# Patient Record
Sex: MALE | Race: WHITE | NOT HISPANIC OR LATINO | Employment: OTHER | URBAN - METROPOLITAN AREA
[De-identification: names, ages, dates, MRNs, and addresses within clinical notes are randomized per-mention and may not be internally consistent; named-entity substitution may affect disease eponyms.]

---

## 2017-08-17 ENCOUNTER — OFFICE VISIT (OUTPATIENT)
Dept: BEHAVIORAL HEALTH | Facility: PHYSICIAN GROUP | Age: 32
End: 2017-08-17
Payer: MEDICARE

## 2017-08-17 DIAGNOSIS — F79 INTELLECTUAL DISABILITY: ICD-10-CM

## 2017-08-17 PROCEDURE — 90832 PSYTX W PT 30 MINUTES: CPT | Performed by: SOCIAL WORKER

## 2017-08-17 NOTE — BH THERAPY
" [facility]  Therapy Progress Note    Patient Name: Leodan Hyatt  Patient MRN: [id number]  Today's Date: 2017     Type of session:Individual psychotherapy  Length of session: 30 minutes  Persons in attendance:Patient and , Kimberly     Subjective/New Info: Per  Kimberly pt (Denis) said at a recent meeting that he wanted to go to counseling.  His guardians, mother and grandmother, then requested these services be provided.  When asked why he is here Denis says \"I'm sad.\"  Says he misses his father, who  approx .  Around that time his grandfather and best friend also  unexpectedly.  Kimberly states that grandfather was a positive influence, and Denis's primary father figure.      Denis received services through [facility] for many years.  When he was 18, his   applied to the \"[facility]\" program, and they placed Denis in a group home where he currently lives with three roommates.  Works at [employer]  approx 6 hrs/day, M-DANILO Harris says she believes Denis has had difficulty coping w/the multiple losses, especially his grandfather.  She is uncertain whether talking with a therapist will be beneficial, as there is some question whether Denis's intellectual capabilities are sufficient to allow him to \"process\" grief in such a way that will be helpful to ease his sadness.      Objective/Observations:   Participation: Limited verbal participation   Grooming: Casual   Cognition: Alert and Fully Oriented   Eye contact: Limited   Mood: Anxious   Affect: Constricted   Thought process: Logical   Speech: Latency of response and limited vocabulary.  Difficulty remembering details, looked to  for help.   Other:     Diagnoses:   1. Intellectual disability         Current risk:   SUICIDE: Low   Homicide: Low   Self-harm: Low   Relapse: Not applicable   Other:    Safety Plan reviewed? No   If evidence of imminent risk is present, intervention/plan:     Therapeutic " Intervention(s): Develop/modify treatment plan and Establish rapport    Treatment Goal(s)/Objective(s) addressed: First session     Progress toward Treatment Goals: First session    Plan:  In discussion w/, it was decided we will schedule two f/u sessions for individual therapy, to determine if this mileu will be beneficial tp pt.    Pooja Shaffer L.C.S.W.  8/17/2017

## 2017-10-05 ENCOUNTER — OFFICE VISIT (OUTPATIENT)
Dept: BEHAVIORAL HEALTH | Facility: PHYSICIAN GROUP | Age: 32
End: 2017-10-05
Payer: MEDICARE

## 2017-10-05 DIAGNOSIS — F79 INTELLECTUAL DISABILITY: ICD-10-CM

## 2017-10-05 PROCEDURE — 90832 PSYTX W PT 30 MINUTES: CPT | Performed by: SOCIAL WORKER

## 2017-10-05 NOTE — BH THERAPY
" [facility]  Therapy Progress Note    Patient Name: Leodan Hyatt  Patient MRN: [id number]  Today's Date: 10/5/2017     Type of session:Individual psychotherapy  Length of session: 30 minutes  Persons in attendance:Patient    Subjective/New Info: \"I miss my dad.\"  Fa  . When asked pt states he remembers father taking him out to eat and giving him hugs.  Did not get to spend a lot of time w/him.  Remembers \"dumpster diving\" w/father when he was young.  Has infrequent contact w/grandmother, uncle, and younger brother, all of whom he reports having good relationships with.   Puts hand on heart when asked where he believes father is.  Believes father was proud of him, remembers being told x 1.    Life is stable at group home, though pt refuses at times to do his chores.      Objective/Observations:   Participation: Limited verbal participation   Grooming: Casual   Cognition: intellectually disabled; brief, concrete responses, no ability to abstract   Eye contact: Good   Mood: Euthymic   Affect: Blunted   Thought process: concrete   Speech: Hypotalkative and Latency of response   Other:     Diagnoses:   1. Intellectual disability         Current risk:   SUICIDE: Low   Homicide: Low   Self-harm: Low   Relapse: Not applicable   Other:    Safety Plan reviewed? Not Indicated   If evidence of imminent risk is present, intervention/plan:     Therapeutic Intervention(s): Stressors assessed and Supportive psychotherapy    Treatment Goal(s)/Objective(s) addressed: Exploring pt's ideas about loss of father     Progress toward Treatment Goals: No change    Plan:  - Continue Individual therapy in one month; discuss termination at that time b/c of pt's limitations to engage in conversation    Pooja Shaffer L.C.S.W.  10/5/2017                                 "

## 2017-11-01 ENCOUNTER — OFFICE VISIT (OUTPATIENT)
Dept: BEHAVIORAL HEALTH | Facility: PHYSICIAN GROUP | Age: 32
End: 2017-11-01
Payer: MEDICARE

## 2017-11-01 DIAGNOSIS — F79 INTELLECTUAL DISABILITY: ICD-10-CM

## 2017-11-01 DIAGNOSIS — F33.1 MAJOR DEPRESSIVE DISORDER, RECURRENT EPISODE, MODERATE (HCC): ICD-10-CM

## 2017-11-01 PROCEDURE — 90832 PSYTX W PT 30 MINUTES: CPT | Performed by: SOCIAL WORKER

## 2017-11-01 NOTE — BH THERAPY
" [facility]  Therapy Progress Note    Patient Name: Leodan Hyatt  Patient MRN: [id number]  Today's Date: 11/1/2017     Type of session:Individual psychotherapy  Length of session: 30 minutes  Persons in attendance:Patient    Subjective/New Info: \"I miss my dad bad.\"  Issue essentially unchanged w/pt offering no elaboration.  Believes father is \"here\" (points to his heart) or \"there\" (points up to 'heaven'\").  Unable to articulate further on subject.  Most questions answered w/\"I don't know.\"  When asked if he wants to return, he states he doesn't know.     Objective/Observations:   Participation: Limited verbal participation   Grooming: Casual   Cognition: Alert and Fully Oriented   Eye contact: Limited   Mood: Depressed   Affect: Blunted   Thought process: Logical   Speech: Hypotalkative and Latency of response   Other:     Diagnoses:   1. Intellectual disability    2. Major depressive disorder, recurrent episode, moderate (CMS-HCC)         Current risk:   SUICIDE: Low   Homicide: Low   Self-harm: Low   Relapse: Not applicable   Other:    Safety Plan reviewed? Not Indicated   If evidence of imminent risk is present, intervention/plan:     Therapeutic Intervention(s): Stressors assessed and Supportive psychotherapy    Treatment Goal(s)/Objective(s) addressed: Explore grief related to death of father     Progress toward Treatment Goals: No change    Plan:  - Spoke w/Kimberly, who agreed therapy not progressing, and will terminate.  Pt understands this concept.    Pooja Shaffer L.C.S.W.  11/1/2017                                 "

## 2018-03-27 ENCOUNTER — HOSPITAL ENCOUNTER (OUTPATIENT)
Dept: LAB | Facility: MEDICAL CENTER | Age: 33
End: 2018-03-27
Attending: FAMILY MEDICINE
Payer: MEDICARE

## 2018-03-27 LAB
ANION GAP SERPL CALC-SCNC: 6 MMOL/L (ref 0–11.9)
BASOPHILS # BLD AUTO: 0.9 % (ref 0–1.8)
BASOPHILS # BLD: 0.08 K/UL (ref 0–0.12)
BUN SERPL-MCNC: 10 MG/DL (ref 8–22)
CALCIUM SERPL-MCNC: 8.9 MG/DL (ref 8.5–10.5)
CHLORIDE SERPL-SCNC: 105 MMOL/L (ref 96–112)
CHOLEST SERPL-MCNC: 120 MG/DL (ref 100–199)
CO2 SERPL-SCNC: 26 MMOL/L (ref 20–33)
CREAT SERPL-MCNC: 0.75 MG/DL (ref 0.5–1.4)
EOSINOPHIL # BLD AUTO: 0.15 K/UL (ref 0–0.51)
EOSINOPHIL NFR BLD: 1.6 % (ref 0–6.9)
ERYTHROCYTE [DISTWIDTH] IN BLOOD BY AUTOMATED COUNT: 41.1 FL (ref 35.9–50)
EST. AVERAGE GLUCOSE BLD GHB EST-MCNC: 108 MG/DL
GLUCOSE SERPL-MCNC: 77 MG/DL (ref 65–99)
HBA1C MFR BLD: 5.4 % (ref 0–5.6)
HCT VFR BLD AUTO: 46.9 % (ref 42–52)
HDLC SERPL-MCNC: 34 MG/DL
HGB BLD-MCNC: 14.4 G/DL (ref 14–18)
IMM GRANULOCYTES # BLD AUTO: 0.04 K/UL (ref 0–0.11)
IMM GRANULOCYTES NFR BLD AUTO: 0.4 % (ref 0–0.9)
LDLC SERPL CALC-MCNC: 73 MG/DL
LYMPHOCYTES # BLD AUTO: 1.3 K/UL (ref 1–4.8)
LYMPHOCYTES NFR BLD: 13.9 % (ref 22–41)
MCH RBC QN AUTO: 26.2 PG (ref 27–33)
MCHC RBC AUTO-ENTMCNC: 30.7 G/DL (ref 33.7–35.3)
MCV RBC AUTO: 85.3 FL (ref 81.4–97.8)
MONOCYTES # BLD AUTO: 0.72 K/UL (ref 0–0.85)
MONOCYTES NFR BLD AUTO: 7.7 % (ref 0–13.4)
NEUTROPHILS # BLD AUTO: 7.06 K/UL (ref 1.82–7.42)
NEUTROPHILS NFR BLD: 75.5 % (ref 44–72)
NRBC # BLD AUTO: 0 K/UL
NRBC BLD-RTO: 0 /100 WBC
PLATELET # BLD AUTO: 247 K/UL (ref 164–446)
PMV BLD AUTO: 11.7 FL (ref 9–12.9)
POTASSIUM SERPL-SCNC: 3.9 MMOL/L (ref 3.6–5.5)
RBC # BLD AUTO: 5.5 M/UL (ref 4.7–6.1)
SODIUM SERPL-SCNC: 137 MMOL/L (ref 135–145)
TRIGL SERPL-MCNC: 66 MG/DL (ref 0–149)
TSH SERPL DL<=0.005 MIU/L-ACNC: 2.45 UIU/ML (ref 0.38–5.33)
WBC # BLD AUTO: 9.4 K/UL (ref 4.8–10.8)

## 2018-03-27 PROCEDURE — 83036 HEMOGLOBIN GLYCOSYLATED A1C: CPT | Mod: GA

## 2018-03-27 PROCEDURE — 84443 ASSAY THYROID STIM HORMONE: CPT

## 2018-03-27 PROCEDURE — 85025 COMPLETE CBC W/AUTO DIFF WBC: CPT

## 2018-03-27 PROCEDURE — 36415 COLL VENOUS BLD VENIPUNCTURE: CPT

## 2018-03-27 PROCEDURE — 80048 BASIC METABOLIC PNL TOTAL CA: CPT

## 2018-03-27 PROCEDURE — 80061 LIPID PANEL: CPT

## 2021-08-23 ENCOUNTER — HOSPITAL ENCOUNTER (EMERGENCY)
Facility: MEDICAL CENTER | Age: 36
End: 2021-08-23
Payer: MEDICARE

## 2021-08-23 VITALS
DIASTOLIC BLOOD PRESSURE: 86 MMHG | SYSTOLIC BLOOD PRESSURE: 157 MMHG | TEMPERATURE: 96.6 F | WEIGHT: 210.1 LBS | HEIGHT: 68 IN | RESPIRATION RATE: 18 BRPM | OXYGEN SATURATION: 97 % | BODY MASS INDEX: 31.84 KG/M2 | HEART RATE: 130 BPM

## 2021-08-23 LAB — EKG IMPRESSION: NORMAL

## 2021-08-23 PROCEDURE — 302449 STATCHG TRIAGE ONLY (STATISTIC)

## 2021-08-23 PROCEDURE — 93005 ELECTROCARDIOGRAM TRACING: CPT

## 2021-08-24 NOTE — ED TRIAGE NOTES
Chief Complaint   Patient presents with   • Tachycardia     today; pt has recently changed medications at group home     Pt ambulatory to triage with caregiver from group home ([facility]) for above complaint. Pt started new medications recently (Austedo 9mg on 7/28 and Mirtazapine on 8/17) and staff at group home is unsure if this could be affecting his heart. Pt only started complaining of fast heart rate today.    Pt has tardive dyskinesia and is constantly moving but is cooperative. Pt is alert/oriented and follows commands. Pt speaking in full sentences and responds appropriately to questions. No acute distress noted in triage and respirations are even and unlabored.     Pt placed in lobby and educated on triage process. Pt and caregiver encouraged to alert staff for any changes in condition.

## 2021-08-25 ENCOUNTER — TELEPHONE (OUTPATIENT)
Dept: HEALTH INFORMATION MANAGEMENT | Facility: OTHER | Age: 36
End: 2021-08-25

## 2021-11-09 ENCOUNTER — HOSPITAL ENCOUNTER (OUTPATIENT)
Dept: RADIOLOGY | Facility: MEDICAL CENTER | Age: 36
End: 2021-11-09
Attending: NURSE PRACTITIONER
Payer: MEDICARE

## 2021-11-09 DIAGNOSIS — G25.5 CHOREA: ICD-10-CM

## 2021-11-09 DIAGNOSIS — G25.2 OTHER SPECIFIED FORMS OF TREMOR: ICD-10-CM

## 2021-11-09 DIAGNOSIS — G24.8 OTHER DYSTONIA: ICD-10-CM

## 2021-11-09 DIAGNOSIS — R25.1 TREMOR: ICD-10-CM

## 2021-11-09 PROCEDURE — 700102 HCHG RX REV CODE 250 W/ 637 OVERRIDE(OP): Performed by: RADIOLOGY

## 2021-11-09 PROCEDURE — A9270 NON-COVERED ITEM OR SERVICE: HCPCS | Performed by: RADIOLOGY

## 2021-11-09 RX ORDER — DIAZEPAM 5 MG/1
10 TABLET ORAL
Status: COMPLETED | OUTPATIENT
Start: 2021-11-09 | End: 2021-11-09

## 2021-11-09 RX ADMIN — DIAZEPAM 10 MG: 5 TABLET ORAL at 11:47

## 2021-11-09 NOTE — DISCHARGE INSTRUCTIONS
MRI ADULT DISCHARGE INSTRUCTIONS    You have been medicated today for your scan. Please follow the instructions below to ensure your safe recovery. If you have any questions or problems, feel free to call us at [phone] or [phone].     1.   Have someone stay with you to assist you as needed.    2.   Do not drive or operate any mechanical devices.    3.   Do not perform any activity that requires concentration. Make no major decisions over the next 24 hours.     4.   Be careful changing positions from laying down to sitting or standing, as you may become dizzy.     5.   Do not drink alcohol for 48 hours.    6.   There are no restrictions for eating your normal meals. Drink fluids.    7.   You may continue your usual medications for pain, tranquilizers, muscle relaxants or sedatives when awake.     8.   Tomorrow, you may continue your normal daily activities.     9.   Pressure dressing on 10 - 15 minutes. If swelling or bleeding occurs when removed, continue placing direct pressure on injection site for another 5 minutes, or until bleeding stops.   Diazepam (VALIUM) Oral solution  What is this medicine?  You were prescribed DIAZEPAM (dye AZ e carolyn) for the procedure you had today. This medication is a benzodiazepine. It is used to treat anxiety and nervousness. It also can help treat alcohol withdrawal, relax muscles, and treat certain types of seizures.  This medicine may be used for other purposes; ask your health care provider or pharmacist if you have questions.  What side effects may I notice from receiving this medicine?  Side effects that you should report to your doctor or health care professional as soon as possible:  • allergic reactions like skin rash, itching or hives, swelling of the face, lips, or tongue  • angry, confused, depressed, other mood changes  • breathing problems  • feeling faint or lightheaded, falls  • muscle cramps  • problems with balance, talking,  walking  • restlessness  • tremors  • trouble passing urine or change in the amount of urine  • unusually weak or tired  Side effects that usually do not require medical attention (report to your doctor or health care professional if they continue or are bothersome):  • difficulty sleeping, nightmares  • dizziness, drowsiness, clumsiness, or unsteadiness, a hangover effect  • headache  • nausea, vomiting  This list may not describe all possible side effects. Call your doctor for medical advice about side effects. You may report side effects to FDA at 5-509-FNT-6695.    I have been informed of and understand the above discharge instructions.

## 2021-11-09 NOTE — PROGRESS NOTES
Tremors did not subside with Valium 10 mg po. Discharged ambulatory with caregiver who was instructed on how to schedule with anesthesia

## 2021-12-21 ENCOUNTER — TELEPHONE (OUTPATIENT)
Dept: RADIOLOGY | Facility: MEDICAL CENTER | Age: 36
End: 2021-12-21
Payer: MEDICARE

## 2021-12-31 ENCOUNTER — PRE-ADMISSION TESTING (OUTPATIENT)
Dept: ADMISSIONS | Facility: MEDICAL CENTER | Age: 36
End: 2021-12-31
Attending: NURSE PRACTITIONER
Payer: MEDICARE

## 2021-12-31 DIAGNOSIS — Z01.811 PRE-OPERATIVE RESPIRATORY EXAMINATION: ICD-10-CM

## 2021-12-31 LAB — COVID ORDER STATUS COVID19: NORMAL

## 2021-12-31 PROCEDURE — U0005 INFEC AGEN DETEC AMPLI PROBE: HCPCS

## 2021-12-31 PROCEDURE — U0003 INFECTIOUS AGENT DETECTION BY NUCLEIC ACID (DNA OR RNA); SEVERE ACUTE RESPIRATORY SYNDROME CORONAVIRUS 2 (SARS-COV-2) (CORONAVIRUS DISEASE [COVID-19]), AMPLIFIED PROBE TECHNIQUE, MAKING USE OF HIGH THROUGHPUT TECHNOLOGIES AS DESCRIBED BY CMS-2020-01-R: HCPCS

## 2022-01-01 LAB
SARS-COV-2 RNA RESP QL NAA+PROBE: NOTDETECTED
SPECIMEN SOURCE: NORMAL

## 2022-01-04 ENCOUNTER — ANESTHESIA EVENT (OUTPATIENT)
Dept: RADIOLOGY | Facility: MEDICAL CENTER | Age: 37
End: 2022-01-04
Payer: MEDICARE

## 2022-01-05 ENCOUNTER — ANESTHESIA (OUTPATIENT)
Dept: RADIOLOGY | Facility: MEDICAL CENTER | Age: 37
End: 2022-01-05
Payer: MEDICARE

## 2022-01-05 ENCOUNTER — HOSPITAL ENCOUNTER (OUTPATIENT)
Dept: RADIOLOGY | Facility: MEDICAL CENTER | Age: 37
End: 2022-01-05
Attending: NURSE PRACTITIONER
Payer: MEDICARE

## 2022-01-05 VITALS
RESPIRATION RATE: 20 BRPM | TEMPERATURE: 96.6 F | OXYGEN SATURATION: 94 % | HEART RATE: 117 BPM | WEIGHT: 147.93 LBS | BODY MASS INDEX: 22.42 KG/M2 | HEIGHT: 68 IN

## 2022-01-05 DIAGNOSIS — G24.8 OTHER DYSTONIA: ICD-10-CM

## 2022-01-05 DIAGNOSIS — G25.2 OTHER SPECIFIED FORMS OF TREMOR: ICD-10-CM

## 2022-01-05 DIAGNOSIS — G25.5 CHOREA: ICD-10-CM

## 2022-01-05 DIAGNOSIS — R25.1 TREMOR: ICD-10-CM

## 2022-01-05 PROCEDURE — 700111 HCHG RX REV CODE 636 W/ 250 OVERRIDE (IP)

## 2022-01-05 PROCEDURE — 700101 HCHG RX REV CODE 250

## 2022-01-05 PROCEDURE — 70551 MRI BRAIN STEM W/O DYE: CPT

## 2022-01-05 RX ORDER — MIDAZOLAM HYDROCHLORIDE 1 MG/ML
INJECTION INTRAMUSCULAR; INTRAVENOUS
Status: DISPENSED
Start: 2022-01-05 | End: 2022-01-05

## 2022-01-05 RX ORDER — ONDANSETRON 2 MG/ML
4 INJECTION INTRAMUSCULAR; INTRAVENOUS
Status: DISCONTINUED | OUTPATIENT
Start: 2022-01-05 | End: 2022-01-06 | Stop reason: HOSPADM

## 2022-01-05 RX ORDER — VALBENAZINE 80 MG/1
80 CAPSULE ORAL DAILY
COMMUNITY
End: 2023-11-16

## 2022-01-05 RX ORDER — DIPHENHYDRAMINE HYDROCHLORIDE 50 MG/ML
12.5 INJECTION INTRAMUSCULAR; INTRAVENOUS
Status: DISCONTINUED | OUTPATIENT
Start: 2022-01-05 | End: 2022-01-06 | Stop reason: HOSPADM

## 2022-01-05 RX ORDER — SODIUM CHLORIDE, SODIUM LACTATE, POTASSIUM CHLORIDE, CALCIUM CHLORIDE 600; 310; 30; 20 MG/100ML; MG/100ML; MG/100ML; MG/100ML
INJECTION, SOLUTION INTRAVENOUS CONTINUOUS
Status: DISCONTINUED | OUTPATIENT
Start: 2022-01-05 | End: 2022-01-06 | Stop reason: HOSPADM

## 2022-01-05 RX ORDER — CLONAZEPAM 2 MG/1
2 TABLET ORAL
COMMUNITY
End: 2023-11-16

## 2022-01-05 RX ORDER — HALOPERIDOL 5 MG/ML
1 INJECTION INTRAMUSCULAR
Status: DISCONTINUED | OUTPATIENT
Start: 2022-01-05 | End: 2022-01-06 | Stop reason: HOSPADM

## 2022-01-05 RX ORDER — SODIUM CHLORIDE, SODIUM LACTATE, POTASSIUM CHLORIDE, AND CALCIUM CHLORIDE .6; .31; .03; .02 G/100ML; G/100ML; G/100ML; G/100ML
500 INJECTION, SOLUTION INTRAVENOUS ONCE
Status: DISCONTINUED | OUTPATIENT
Start: 2022-01-05 | End: 2022-01-06 | Stop reason: HOSPADM

## 2022-01-05 RX ORDER — QUETIAPINE FUMARATE 25 MG/1
25 TABLET, FILM COATED ORAL
COMMUNITY
End: 2022-05-26

## 2022-01-05 ASSESSMENT — PAIN SCALES - GENERAL: PAIN_LEVEL: 0

## 2022-01-05 NOTE — ANESTHESIA POSTPROCEDURE EVALUATION
Patient: Leodan Hyatt    Procedure Summary     Date: 01/05/22 Room / Location: [facility] - MRI - [address]    Anesthesia Start:  Anesthesia Stop:     Procedure: MR-BRAIN-W/O Diagnosis:       Other specified forms of tremor      Tremor      Other dystonia      Chorea          Scheduled Providers: Jett Churchill M.D. Responsible Provider:     Anesthesia Type: general ASA Status: Not recorded          Final Anesthesia Type: No value filed.  Last vitals  BP   NIBP: (!) 76/52    Temp   35.9 °C (96.6 °F)    Pulse   (!) 120   Resp   (!) 24    SpO2   96 %      Anesthesia Post Evaluation    Patient location during evaluation: PACU  Patient participation: complete - patient participated  Level of consciousness: awake and alert  Pain score: 0    Airway patency: patent  Anesthetic complications: no  Cardiovascular status: hemodynamically stable  Respiratory status: acceptable  Hydration status: euvolemic    PONV: none      Difficult to get BP on pt due to involuntary movements. Last BP was 150/105. Sitting.  Pt does have dx of orthostatic hypotension.     No complications documented.     Nurse Pain Score: 0 (NPRS)

## 2022-01-05 NOTE — PROGRESS NOTES
"MRI NURSING NOTES:      Patient to MRI Outpatient Dept.  Patient informed process and plan of care during this visit.  Anesthesiologist, Dr Cordova spoke c Patient and discussed provider's plan of care.  Patient agreed.  Pt found to be tachypneic and tachycardic upon assessment for pre-op vitals - per records this is Pt's baseline.  MRI Brain s contrast, sz protocol completed.  Patient awoke from anesthesia s discomfort.   bolus given per MD order for Bp 76/52.  Patient tolerated diet and activities once awake and appropriate.  DC instructions discussed. Questions encouraged.  Questions answered &/or deferred to provider.  Multiple attempts to obtain BP due to TD.  Pt states always difficult to take his BP. After several attempts, /105 p LR bolus.  Pt felt \"fine\" throughout recovery to DC.    Patient DC'd in stable condition c responsible adult, Valente .        "

## 2022-01-05 NOTE — DISCHARGE INSTRUCTIONS
MRI ADULT DISCHARGE INSTRUCTIONS    You have been medicated today for your scan. Please follow the instructions below to ensure your safe recovery. If you have any questions or problems, feel free to call us at [phone] or [phone].     1.   Have someone stay with you to assist you as needed.    2.   Do not drive or operate any mechanical devices.    3.   Do not perform any activity that requires concentration. Make no major decisions over the next 24 hours.     4.   Be careful changing positions from laying down to sitting or standing, as you may become dizzy.     5.   Do not drink alcohol for 48 hours.    6.   There are no restrictions for eating your normal meals. Drink fluids.    7.   You may continue your usual medications for pain, tranquilizers, muscle relaxants or sedatives when awake.     8.   Tomorrow, you may continue your normal daily activities.     9.   Pressure dressing on 10 - 15 minutes. If swelling or bleeding occurs when removed, continue placing direct pressure on injection site for another 5 minutes, or until bleeding stops.     Midazolam (VERSED)  What is this medicine?  You were given MIDAZOLAM (MARK golden) for your procedure today. This medication is a benzodiazepine. It is used to cause relaxation or sleep before surgery and to block the memory of the procedure.  This medicine may be used for other purposes; ask your health care provider or pharmacist if you have questions.  What side effects may I notice from receiving this medicine?  Side effects that you should report to your doctor or health care professional as soon as possible:  • allergic reactions like skin rash, itching or hives, swelling of the face, lips, or tongue  • breathing problems  • confusion  • dizziness or lightheadedness  • fast, irregular heartbeat  • halluninations during recovery  • numbness or tingling in the hands or feet  • pain, redness, or swelling at site where injected  • seizures  Side effects that usually  do not require medical attention (report to your doctor or health care professional if they continue or are bothersome):  • coughing  • headache  • hiccups  • involuntary eye and muscle movements  • loss of memory of events just before, during, and after use  • nausea, vomiting  • speech problems  • tiredness  • trouble sleeping or nightmares  This list may not describe all possible side effects. Call your doctor for medical advice about side effects. You may report side effects to FDA at 4-898-ORD-9620.    Fentanyl  What is this medicine?  You were given FENTANYL (FEN ta nil) for your procedure today, it is a pain reliever. It is used to treat breakthrough pain that your long acting pain medicine does not control. Do not use this medicine for a pain that will go away in a few days like pain from surgery, doctor or dentist visits.   This medicine may be used for other purposes; ask your health care provider or pharmacist if you have questions.  What side effects may I notice from receiving this medicine?  Side effects that you should report to your doctor or health care professional as soon as possible:  • allergic reactions like skin rash, itching or hives, swelling of the face, lips, or tongue  • breathing problems  • changes in vision  • confusion  • dry mouth  • feeling faint, lightheaded  • hallucination  • irregular heartbeat  • mouth pain, sores  • problems with balance, talking, walking  • trouble passing urine or change in the amount of urine  • unusual bleeding or bruising  • unusually weak or tired  Side effects that usually do not require medical attention (report to your doctor or health care professional if they continue or are bothersome):  • dizzy  • headache  • loss of appetite  • nausea, vomiting  • sweating  • tingling in mouth  This list may not describe all possible side effects. Call your doctor for medical advice about side effects. You may report side effects to FDA at 6-360-FDA-8964.      I  have been informed of and understand the above discharge instructions.

## 2022-01-05 NOTE — ANESTHESIA TIME REPORT
Anesthesia Start and Stop Event Times    No anesthesia events of the specified type filed       Responsible Staff    No responsible staff documented.       Preop Diagnosis (Free Text):  Pre-op Diagnosis             Preop Diagnosis (Codes):    Premium Reason  Non-Premium    Comments:

## 2022-01-05 NOTE — ANESTHESIA PREPROCEDURE EVALUATION
Date/Time: 01/05/22 1215    Scheduled providers: Jett Churchill M.D.    Procedure: MR-BRAIN-W/O    Diagnosis:       Other specified forms of tremor [G25.2]      Tremor [R25.1]      Other dystonia [G24.8]      Chorea [G25.5]          Location: [facility] - MRI - [address]          Relevant Problems   No relevant active problems       Physical Exam    Airway   Mallampati: II  TM distance: >3 FB  Neck ROM: full       Cardiovascular - normal exam  Rhythm: regular  Rate: normal  (-) murmur     Dental - normal exam           Pulmonary - normal exam  Breath sounds clear to auscultation     Abdominal    Neurological - normal exam         Other findings: Edentulous, sever tardive dyskinesia        Pt is non-verbal.  states he has good stamina, no syncope.  I do not hear any murmur (concern is AS). Echo was rescheduled d/t involuntary movement.    Anesthesia Plan    ASA 2       Plan - general       Airway plan will be LMA          Induction: intravenous    Postoperative Plan: Postoperative administration of opioids is intended.    Pertinent diagnostic labs and testing reviewed    Informed Consent:    Anesthetic plan and risks discussed with patient.    Use of blood products discussed with: patient whom consented to blood products.

## 2022-01-18 ENCOUNTER — TELEPHONE (OUTPATIENT)
Dept: HEALTH INFORMATION MANAGEMENT | Facility: OTHER | Age: 37
End: 2022-01-18

## 2022-04-06 ENCOUNTER — OFFICE VISIT (OUTPATIENT)
Dept: MEDICAL GROUP | Facility: PHYSICIAN GROUP | Age: 37
End: 2022-04-06
Payer: MEDICARE

## 2022-04-06 VITALS
RESPIRATION RATE: 18 BRPM | DIASTOLIC BLOOD PRESSURE: 62 MMHG | TEMPERATURE: 97.5 F | OXYGEN SATURATION: 96 % | SYSTOLIC BLOOD PRESSURE: 100 MMHG | HEIGHT: 68 IN | BODY MASS INDEX: 22.49 KG/M2 | HEART RATE: 101 BPM

## 2022-04-06 DIAGNOSIS — K61.1 PERIRECTAL ABSCESS: ICD-10-CM

## 2022-04-06 DIAGNOSIS — F17.209 NICOTINE DEPENDENCE WITH NICOTINE-INDUCED DISORDER, UNSPECIFIED NICOTINE PRODUCT TYPE: ICD-10-CM

## 2022-04-06 DIAGNOSIS — Z76.89 ENCOUNTER TO ESTABLISH CARE: ICD-10-CM

## 2022-04-06 DIAGNOSIS — F39 MOOD DISORDER (HCC): ICD-10-CM

## 2022-04-06 PROBLEM — L60.2 ONYCHOGRYPHOSIS: Status: ACTIVE | Noted: 2020-09-02

## 2022-04-06 PROBLEM — L84 CALLUS OF FOOT: Status: ACTIVE | Noted: 2021-03-10

## 2022-04-06 PROBLEM — F70 MILD INTELLECTUAL DISABILITY: Status: ACTIVE | Noted: 2020-09-02

## 2022-04-06 PROBLEM — Z02.5 ENCOUNTER FOR EXAMINATION FOR PARTICIPATION IN SPORT: Status: ACTIVE | Noted: 2018-10-10

## 2022-04-06 PROBLEM — Z00.00 ENCOUNTER FOR GENERAL ADULT MEDICAL EXAMINATION WITHOUT ABNORMAL FINDINGS: Status: ACTIVE | Noted: 2017-10-30

## 2022-04-06 PROBLEM — D50.9 ANEMIA, IRON DEFICIENCY: Status: ACTIVE | Noted: 2017-10-30

## 2022-04-06 PROBLEM — E66.9 OBESITY: Status: ACTIVE | Noted: 2017-10-30

## 2022-04-06 PROCEDURE — 99204 OFFICE O/P NEW MOD 45 MIN: CPT | Performed by: FAMILY MEDICINE

## 2022-04-06 RX ORDER — PROPRANOLOL HYDROCHLORIDE 20 MG/1
20 TABLET ORAL 2 TIMES DAILY
COMMUNITY
Start: 2022-03-24

## 2022-04-06 ASSESSMENT — ENCOUNTER SYMPTOMS
NAUSEA: 0
HEMOPTYSIS: 0
SORE THROAT: 0
SHORTNESS OF BREATH: 0
CHILLS: 0
PALPITATIONS: 0
COUGH: 1
MYALGIAS: 0
DOUBLE VISION: 0
VOMITING: 0
HEADACHES: 0
DIZZINESS: 0
BLURRED VISION: 0
FEVER: 0

## 2022-04-06 ASSESSMENT — PATIENT HEALTH QUESTIONNAIRE - PHQ9: CLINICAL INTERPRETATION OF PHQ2 SCORE: 0

## 2022-04-06 NOTE — PROGRESS NOTES
"Subjective:     CC:  Diagnoses of Mood disorder (HCC), Encounter to establish care, Nicotine dependence with nicotine-induced disorder, unspecified nicotine product type, and Perirectal abscess were pertinent to this visit.    HISTORY OF THE PRESENT ILLNESS: Patient is a 37 y.o. male. This pleasant patient is here today to establish care and discuss     1) est care, with caregiver  2) smoker, not interested in cessation  3) wt loss \"lots\" in last year, linked to dentation and movement dx  4) appt with movement neuro in [facility]  Had MRI  wnl  Tardive from meds  5) psych f/u q4wks    His/her prior PCP was [facility], records for labs requested    Problem   Encounter to Establish Care    Est care with caregiver balbir  Wt loss over the last year from tardive dyskinesia  Has good 4wk f/u with psych  No active concerns        Callus of Foot   Mild Intellectual Disability   Onychogryphosis   Encounter for Examination for Participation in Sport   Anemia, Iron Deficiency   Obesity   Encounter for General Adult Medical Examination Without Abnormal Findings   Unspecified Contact Dermatitis, Unspecified Cause   Neoplasm of Uncertain Behavior of Skin   Abscess, Perianal   Perirectal Abscess   General Medical Examination   Attention Deficit Hyperactivity Disorder (Adhd), Combined Type   Mood Disorder (Hcc)   Disorder of Tooth Development   Nicotine Addiction   Abdominal Pain, Epigastric   Skin Disorder   Encounter for Removal of Sutures (Resolved)       Current Outpatient Medications Ordered in Epic   Medication Sig Dispense Refill   • Valbenazine Tosylate (INGREZZA) 80 MG Cap Take 1 Capsule by mouth. Per Aurora Victor   Indications: Tardive Dyskinesia     • QUEtiapine Fumarate (SEROQUEL PO) Take  by mouth at bedtime as needed. Per Aurora Victor. Dose unknown @@ this time.   Indications: Trouble Sleeping     • clonazePAM (KLONOPIN) 1 MG Tab Take 2 mg by mouth 2 times a day. Per Aurora Victor: takes @@ Noon & 1700   " "Indications: Feeling Anxious     • propranolol (INDERAL) 10 MG Tab 10 mg.     • ondansetron (ZOFRAN ODT) 4 MG TABLET DISPERSIBLE Take 1 Tab by mouth every four hours as needed for Nausea/Vomiting (give PO if no IV route available). (Patient not taking: No sig reported) 10 Tab 0     No current Epic-ordered facility-administered medications on file.     ROS:   Review of Systems   Constitutional: Negative for chills and fever.   HENT: Negative for ear pain and sore throat.    Eyes: Negative for blurred vision and double vision.   Respiratory: Positive for cough. Negative for hemoptysis and shortness of breath.    Cardiovascular: Negative for chest pain and palpitations.   Gastrointestinal: Negative for nausea and vomiting.   Genitourinary: Negative for dysuria and hematuria.   Musculoskeletal: Negative for myalgias.   Neurological: Negative for dizziness and headaches.         Objective:       Exam: /62 (BP Location: Left arm, Patient Position: Sitting, BP Cuff Size: Adult)   Pulse (!) 101   Temp 36.4 °C (97.5 °F) (Temporal)   Resp 18   Ht 1.727 m (5' 8\")   SpO2 96%  Body mass index is 22.49 kg/m².    Physical Exam  Constitutional:       General: He is not in acute distress.     Appearance: He is not ill-appearing, toxic-appearing or diaphoretic.   HENT:      Head: Normocephalic.   Eyes:      General: No scleral icterus.        Right eye: No discharge.         Left eye: No discharge.      Extraocular Movements: Extraocular movements intact.      Conjunctiva/sclera: Conjunctivae normal.      Pupils: Pupils are equal, round, and reactive to light.   Cardiovascular:      Rate and Rhythm: Normal rate.      Pulses: Normal pulses.      Heart sounds: Normal heart sounds. No murmur heard.  Pulmonary:      Effort: Pulmonary effort is normal. No respiratory distress.      Breath sounds: Normal breath sounds.   Abdominal:      General: There is no distension.   Musculoskeletal:      Right lower leg: No edema.   Skin:   "   Findings: Lesion present.   Neurological:      Mental Status: He is alert.      Coordination: Coordination abnormal.      Gait: Gait abnormal.       Healing scab on back of head    Using walker for assist    Tardive movement disorder       Assessment & Plan:   37 y.o. male with the following -    Problem List Items Addressed This Visit     Perirectal abscess     resolved         Mood disorder (HCC)     Chronic  Has good f/u with psych  Recent medication changes due to side effects           Nicotine addiction     Asked about cessation  Not interested at this time  Slight chronic cough         Encounter to establish care     Record request for last labs  Needs yearly             Return in about 6 months (around 10/6/2022), or if symptoms worsen or fail to improve, for f/u labs.    Please note that this dictation was created using voice recognition software. I have made every reasonable attempt to correct obvious errors, but I expect that there are errors of grammar and possibly content that I did not discover before finalizing the note.

## 2022-04-06 NOTE — LETTER
[facility]  Tom Aquino M.D.  [address]  [facility] [address]  Fax: [phone]   Authorization for Release/Disclosure of   Protected Health Information   Name: RUTHIE HYATT : 1985 SSN: [id number]   Address: [address] [place] [address] Phone:    [phone] (home)    I authorize the entity listed below to release/disclose the PHI below to:   [facility]/Tom Aquino M.D. and Tom Aquino M.D.   Provider or Entity Name:     Address   City, State, Zip   Phone:      Fax:     Reason for request: continuity of care   Information to be released:    [  ] LAST COLONOSCOPY,  including any PATH REPORT and follow-up  [  ] LAST FIT/COLOGUARD RESULT [  ] LAST DEXA  [  ] LAST MAMMOGRAM  [  ] LAST PAP  [  ] LAST LABS [  ] RETINA EXAM REPORT  [  ] IMMUNIZATION RECORDS  [  ] Release all info      [  ] Check here and initial the line next to each item to release ALL health information INCLUDING  _____ Care and treatment for drug and / or alcohol abuse  _____ HIV testing, infection status, or AIDS  _____ Genetic Testing    DATES OF SERVICE OR TIME PERIOD TO BE DISCLOSED: _____________  I understand and acknowledge that:  * This Authorization may be revoked at any time by you in writing, except if your health information has already been used or disclosed.  * Your health information that will be used or disclosed as a result of you signing this authorization could be re-disclosed by the recipient. If this occurs, your re-disclosed health information may no longer be protected by State or Federal laws.  * You may refuse to sign this Authorization. Your refusal will not affect your ability to obtain treatment.  * This Authorization becomes effective upon signing and will  on (date) __________.      If no date is indicated, this Authorization will  one (1) year from the signature date.    Name: Ruthie Hyatt    Signature:   Date:     2022       PLEASE FAX  REQUESTED RECORDS BACK TO: [phone]

## 2022-04-28 ENCOUNTER — HOME HEALTH ADMISSION (OUTPATIENT)
Dept: HOME HEALTH SERVICES | Facility: HOME HEALTHCARE | Age: 37
End: 2022-04-28
Payer: MEDICARE

## 2022-05-02 ENCOUNTER — HOME CARE VISIT (OUTPATIENT)
Dept: HOME HEALTH SERVICES | Facility: HOME HEALTHCARE | Age: 37
End: 2022-05-02

## 2022-05-03 ENCOUNTER — OFFICE VISIT (OUTPATIENT)
Dept: MEDICAL GROUP | Facility: PHYSICIAN GROUP | Age: 37
End: 2022-05-03
Payer: MEDICARE

## 2022-05-03 VITALS
RESPIRATION RATE: 20 BRPM | BODY MASS INDEX: 19.19 KG/M2 | OXYGEN SATURATION: 99 % | WEIGHT: 126.6 LBS | SYSTOLIC BLOOD PRESSURE: 98 MMHG | TEMPERATURE: 98 F | HEART RATE: 101 BPM | DIASTOLIC BLOOD PRESSURE: 64 MMHG | HEIGHT: 68 IN

## 2022-05-03 DIAGNOSIS — J69.0 ASPIRATION PNEUMONIA, UNSPECIFIED ASPIRATION PNEUMONIA TYPE, UNSPECIFIED LATERALITY, UNSPECIFIED PART OF LUNG (HCC): ICD-10-CM

## 2022-05-03 DIAGNOSIS — F17.209 NICOTINE DEPENDENCE WITH NICOTINE-INDUCED DISORDER, UNSPECIFIED NICOTINE PRODUCT TYPE: ICD-10-CM

## 2022-05-03 PROBLEM — J18.9 PNEUMONIA: Status: ACTIVE | Noted: 2022-05-03

## 2022-05-03 PROCEDURE — 99213 OFFICE O/P EST LOW 20 MIN: CPT | Performed by: FAMILY MEDICINE

## 2022-05-03 ASSESSMENT — ENCOUNTER SYMPTOMS
VOMITING: 0
HEADACHES: 0
NAUSEA: 0
DOUBLE VISION: 0
MYALGIAS: 0
CHILLS: 0
SORE THROAT: 0
PALPITATIONS: 0
FEVER: 0
DIZZINESS: 0
BLURRED VISION: 0
SHORTNESS OF BREATH: 0
COUGH: 0

## 2022-05-03 NOTE — PROGRESS NOTES
Subjective:     CC: ER f/u     HPI:   Leodan presents today with caregiver    1) Hospital f/u  Aspiration pna  Likely due to the TD and tremor  Completed IV and PO abx  Caregiver states Tardive dyskinesia improved when off his medication at the hospital or that the ABX helped with the TD?    No fevers   No cough  Doing well    2) would like smoking cessation  Smokes 4 cig per day  Called and discussed with grandmother  Goal to taper off all together    Problem   Pneumonia    Resolved  F/u with psych for TD discussion   Likely cause of aspiration   rtc if concerns     Nicotine Addiction       Current Outpatient Medications Ordered in Epic   Medication Sig Dispense Refill   • nicotine (NICOTROL) 10 MG inhaler Inhale 1 Puff as needed for Smoking Cessation. Use 1-4 cartiages per day to taper off smoking 168 Each 0   • nicotine (NICODERM) 7 MG/24HR PATCH 24 HR Place 1 Patch on the skin every 24 hours. 30 Patch 3   • propranolol (INDERAL) 10 MG Tab 20 mg.     • Valbenazine Tosylate (INGREZZA) 80 MG Cap Take 1 Capsule by mouth. Per Aurora Victor   Indications: Tardive Dyskinesia     • QUEtiapine Fumarate (SEROQUEL PO) Take  by mouth at bedtime as needed. Per Aurora Victor. Dose unknown @@ this time.   Indications: Trouble Sleeping     • clonazePAM (KLONOPIN) 1 MG Tab Take 2 mg by mouth 2 times a day. Per Aurora Victor: takes @@ Noon & 1700   Indications: Feeling Anxious       No current [facility]-ordered facility-administered medications on file.     ROS:  Review of Systems   Constitutional: Negative for chills and fever.   HENT: Negative for ear pain and sore throat.    Eyes: Negative for blurred vision and double vision.   Respiratory: Negative for cough and shortness of breath.    Cardiovascular: Negative for chest pain and palpitations.   Gastrointestinal: Negative for nausea and vomiting.   Genitourinary: Negative for dysuria and hematuria.   Musculoskeletal: Negative for myalgias.   Neurological: Negative for  "dizziness and headaches.       Objective:     Exam:  BP (!) 98/64 (BP Location: Left arm, Patient Position: Sitting, BP Cuff Size: Adult)   Pulse (!) 101   Temp 36.7 °C (98 °F) (Temporal)   Resp 20   Ht 1.727 m (5' 8\")   Wt 57.4 kg (126 lb 9.6 oz)   SpO2 99%   BMI 19.25 kg/m²  Body mass index is 19.25 kg/m².    Physical Exam  Constitutional:       General: He is not in acute distress.     Appearance: He is not ill-appearing, toxic-appearing or diaphoretic.   HENT:      Head: Normocephalic and atraumatic.   Eyes:      General: No scleral icterus.        Right eye: No discharge.         Left eye: No discharge.      Extraocular Movements: Extraocular movements intact.      Conjunctiva/sclera: Conjunctivae normal.      Pupils: Pupils are equal, round, and reactive to light.   Pulmonary:      Effort: Pulmonary effort is normal. No respiratory distress.      Breath sounds: Normal breath sounds. No wheezing, rhonchi or rales.   Neurological:      Mental Status: He is alert.      Gait: Gait abnormal.     profound Tardive Dyskinsea    Assessment & Plan:     37 y.o. male with the following -     Problem List Items Addressed This Visit     Nicotine addiction     Goal to quit all together  Trial of patch 7mg low dose and 1-4 nicotrol daily until resolved  rtc if concerns         Relevant Medications    nicotine (NICOTROL) 10 MG inhaler    nicotine (NICODERM) 7 MG/24HR PATCH 24 HR    Pneumonia        Return in about 3 months (around 8/3/2022), or if symptoms worsen or fail to improve.    Please note that this dictation was created using voice recognition software. I have made every reasonable attempt to correct obvious errors, but I expect that there are errors of grammar and possibly content that I did not discover before finalizing the note.      "

## 2022-05-03 NOTE — LETTER
[facility]  Tom Aquino M.D.  [address]  [facility] [address]  Fax: [phone]   Authorization for Release/Disclosure of   Protected Health Information   Name: RUTHIE LEES : 1985 SSN: [id number]   Address: [address] [place] [address] Phone:    [phone] (home)    I authorize the entity listed below to release/disclose the PHI below to:   [facility]/Tom Aquino M.D. and Tom Aquino M.D.   Provider or Entity Name:[facility]     Address   [facility]   Phone:[phone]      Fax:[phone]     Reason for request: continuity of care   Information to be released:    [  ] LAST COLONOSCOPY,  including any PATH REPORT and follow-up  [  ] LAST FIT/COLOGUARD RESULT [  ] LAST DEXA  [  ] LAST MAMMOGRAM  [  ] LAST PAP  [  ] LAST LABS [  ] RETINA EXAM REPORT  [  ] IMMUNIZATION RECORDS  [ xxx ] Release all info      [  ] Check here and initial the line next to each item to release ALL health information INCLUDING  _____ Care and treatment for drug and / or alcohol abuse  _____ HIV testing, infection status, or AIDS  _____ Genetic Testing    DATES OF SERVICE OR TIME PERIOD TO BE DISCLOSED: _____________  I understand and acknowledge that:  * This Authorization may be revoked at any time by you in writing, except if your health information has already been used or disclosed.  * Your health information that will be used or disclosed as a result of you signing this authorization could be re-disclosed by the recipient. If this occurs, your re-disclosed health information may no longer be protected by State or Federal laws.  * You may refuse to sign this Authorization. Your refusal will not affect your ability to obtain treatment.  * This Authorization becomes effective upon signing and will  on (date) __________.      If no date is indicated, this Authorization will  one (1) year from the signature date.    Name: Ruthie [address]  Olena    Signature:Continuation of Care   Date:     5/3/2022       PLEASE FAX REQUESTED RECORDS BACK TO: [phone]

## 2022-05-03 NOTE — ASSESSMENT & PLAN NOTE
Goal to quit all together  Trial of patch 7mg low dose and 1-4 nicotrol daily until resolved  rtc if concerns

## 2022-05-15 ENCOUNTER — APPOINTMENT (OUTPATIENT)
Dept: RADIOLOGY | Facility: MEDICAL CENTER | Age: 37
End: 2022-05-15
Attending: EMERGENCY MEDICINE
Payer: MEDICARE

## 2022-05-15 ENCOUNTER — HOSPITAL ENCOUNTER (EMERGENCY)
Facility: MEDICAL CENTER | Age: 37
End: 2022-05-15
Attending: EMERGENCY MEDICINE
Payer: MEDICARE

## 2022-05-15 VITALS
DIASTOLIC BLOOD PRESSURE: 72 MMHG | SYSTOLIC BLOOD PRESSURE: 110 MMHG | BODY MASS INDEX: 19.1 KG/M2 | HEART RATE: 93 BPM | OXYGEN SATURATION: 96 % | HEIGHT: 68 IN | WEIGHT: 126 LBS | TEMPERATURE: 97 F | RESPIRATION RATE: 20 BRPM

## 2022-05-15 DIAGNOSIS — J18.9 PNEUMONIA OF LEFT LOWER LOBE DUE TO INFECTIOUS ORGANISM: ICD-10-CM

## 2022-05-15 LAB
ALBUMIN SERPL BCP-MCNC: 4 G/DL (ref 3.2–4.9)
ALBUMIN/GLOB SERPL: 1.4 G/DL
ALP SERPL-CCNC: 135 U/L (ref 30–99)
ALT SERPL-CCNC: 14 U/L (ref 2–50)
ANION GAP SERPL CALC-SCNC: 11 MMOL/L (ref 7–16)
AST SERPL-CCNC: 18 U/L (ref 12–45)
BASOPHILS # BLD AUTO: 0.5 % (ref 0–1.8)
BASOPHILS # BLD: 0.05 K/UL (ref 0–0.12)
BILIRUB SERPL-MCNC: 0.4 MG/DL (ref 0.1–1.5)
BUN SERPL-MCNC: 12 MG/DL (ref 8–22)
CALCIUM SERPL-MCNC: 8.9 MG/DL (ref 8.5–10.5)
CHLORIDE SERPL-SCNC: 100 MMOL/L (ref 96–112)
CO2 SERPL-SCNC: 24 MMOL/L (ref 20–33)
CREAT SERPL-MCNC: 0.37 MG/DL (ref 0.5–1.4)
D DIMER PPP IA.FEU-MCNC: 1.02 UG/ML (FEU) (ref 0–0.5)
EKG IMPRESSION: NORMAL
EOSINOPHIL # BLD AUTO: 0.09 K/UL (ref 0–0.51)
EOSINOPHIL NFR BLD: 0.9 % (ref 0–6.9)
ERYTHROCYTE [DISTWIDTH] IN BLOOD BY AUTOMATED COUNT: 42.7 FL (ref 35.9–50)
FLUAV RNA SPEC QL NAA+PROBE: NEGATIVE
FLUBV RNA SPEC QL NAA+PROBE: NEGATIVE
GFR SERPLBLD CREATININE-BSD FMLA CKD-EPI: 147 ML/MIN/1.73 M 2
GLOBULIN SER CALC-MCNC: 2.8 G/DL (ref 1.9–3.5)
GLUCOSE SERPL-MCNC: 92 MG/DL (ref 65–99)
HCT VFR BLD AUTO: 40.7 % (ref 42–52)
HGB BLD-MCNC: 13.1 G/DL (ref 14–18)
IMM GRANULOCYTES # BLD AUTO: 0.04 K/UL (ref 0–0.11)
IMM GRANULOCYTES NFR BLD AUTO: 0.4 % (ref 0–0.9)
LYMPHOCYTES # BLD AUTO: 1.49 K/UL (ref 1–4.8)
LYMPHOCYTES NFR BLD: 15.3 % (ref 22–41)
MCH RBC QN AUTO: 26.8 PG (ref 27–33)
MCHC RBC AUTO-ENTMCNC: 32.2 G/DL (ref 33.7–35.3)
MCV RBC AUTO: 83.4 FL (ref 81.4–97.8)
MONOCYTES # BLD AUTO: 0.95 K/UL (ref 0–0.85)
MONOCYTES NFR BLD AUTO: 9.7 % (ref 0–13.4)
NEUTROPHILS # BLD AUTO: 7.14 K/UL (ref 1.82–7.42)
NEUTROPHILS NFR BLD: 73.2 % (ref 44–72)
NRBC # BLD AUTO: 0 K/UL
NRBC BLD-RTO: 0 /100 WBC
PLATELET # BLD AUTO: 211 K/UL (ref 164–446)
PMV BLD AUTO: 10.5 FL (ref 9–12.9)
POTASSIUM SERPL-SCNC: 4.1 MMOL/L (ref 3.6–5.5)
PROT SERPL-MCNC: 6.8 G/DL (ref 6–8.2)
RBC # BLD AUTO: 4.88 M/UL (ref 4.7–6.1)
RSV RNA SPEC QL NAA+PROBE: NEGATIVE
SARS-COV-2 RNA RESP QL NAA+PROBE: NOTDETECTED
SODIUM SERPL-SCNC: 135 MMOL/L (ref 135–145)
SPECIMEN SOURCE: NORMAL
WBC # BLD AUTO: 9.8 K/UL (ref 4.8–10.8)

## 2022-05-15 PROCEDURE — 700102 HCHG RX REV CODE 250 W/ 637 OVERRIDE(OP): Performed by: EMERGENCY MEDICINE

## 2022-05-15 PROCEDURE — 71045 X-RAY EXAM CHEST 1 VIEW: CPT

## 2022-05-15 PROCEDURE — A9270 NON-COVERED ITEM OR SERVICE: HCPCS | Performed by: EMERGENCY MEDICINE

## 2022-05-15 PROCEDURE — 80053 COMPREHEN METABOLIC PANEL: CPT

## 2022-05-15 PROCEDURE — 85379 FIBRIN DEGRADATION QUANT: CPT

## 2022-05-15 PROCEDURE — 99284 EMERGENCY DEPT VISIT MOD MDM: CPT

## 2022-05-15 PROCEDURE — 36415 COLL VENOUS BLD VENIPUNCTURE: CPT

## 2022-05-15 PROCEDURE — 85025 COMPLETE CBC W/AUTO DIFF WBC: CPT

## 2022-05-15 PROCEDURE — 93005 ELECTROCARDIOGRAM TRACING: CPT | Performed by: EMERGENCY MEDICINE

## 2022-05-15 PROCEDURE — C9803 HOPD COVID-19 SPEC COLLECT: HCPCS | Performed by: EMERGENCY MEDICINE

## 2022-05-15 PROCEDURE — 0241U HCHG SARS-COV-2 COVID-19 NFCT DS RESP RNA 4 TRGT MIC: CPT

## 2022-05-15 PROCEDURE — 71275 CT ANGIOGRAPHY CHEST: CPT | Mod: ME

## 2022-05-15 PROCEDURE — 93005 ELECTROCARDIOGRAM TRACING: CPT

## 2022-05-15 PROCEDURE — 700117 HCHG RX CONTRAST REV CODE 255: Performed by: EMERGENCY MEDICINE

## 2022-05-15 RX ORDER — CEFDINIR 300 MG/1
300 CAPSULE ORAL 2 TIMES DAILY
Qty: 14 CAPSULE | Refills: 0 | Status: SHIPPED | OUTPATIENT
Start: 2022-05-15 | End: 2022-05-22

## 2022-05-15 RX ORDER — AZITHROMYCIN 250 MG/1
500 TABLET, FILM COATED ORAL ONCE
Status: COMPLETED | OUTPATIENT
Start: 2022-05-15 | End: 2022-05-15

## 2022-05-15 RX ORDER — CEFDINIR 300 MG/1
300 CAPSULE ORAL ONCE
Status: COMPLETED | OUTPATIENT
Start: 2022-05-15 | End: 2022-05-15

## 2022-05-15 RX ORDER — AZITHROMYCIN 250 MG/1
TABLET, FILM COATED ORAL
Qty: 6 TABLET | Refills: 0 | Status: SHIPPED | OUTPATIENT
Start: 2022-05-15 | End: 2022-06-28

## 2022-05-15 RX ADMIN — IOHEXOL 48 ML: 350 INJECTION, SOLUTION INTRAVENOUS at 07:53

## 2022-05-15 RX ADMIN — AZITHROMYCIN MONOHYDRATE 500 MG: 250 TABLET ORAL at 09:22

## 2022-05-15 RX ADMIN — CEFDINIR 300 MG: 300 CAPSULE ORAL at 09:22

## 2022-05-15 ASSESSMENT — LIFESTYLE VARIABLES: DO YOU DRINK ALCOHOL: NO

## 2022-05-15 NOTE — ED TRIAGE NOTES
"Chief Complaint   Patient presents with   • Back Pain     Began x1 month sharp 7/10 pain, consistent   • Malaise     Feeling weak/tired since dx of PNE last month, completed meds for PNE   • Shortness of Breath       BIB [facility] for above complaint. Pt has baseline developmental delay. EMS states pt had PNE dx April 25th. Pt states has some SOB.    SOB protocol ordered. Pt educated of triage process and informed to contact staff if situation changes.    /81   Pulse 94   Temp 36.1 °C (96.9 °F) (Temporal)   Resp 16   Ht 1.727 m (5' 8\")   Wt 57.2 kg (126 lb)   SpO2 98%   BMI 19.16 kg/m²       "

## 2022-05-15 NOTE — ED NOTES
Called Kimberly caretaker, provided with d/c instruction and diagnosis. She will send ride home .

## 2022-05-15 NOTE — ED PROVIDER NOTES
"ED Provider Note    CHIEF COMPLAINT  Chief Complaint   Patient presents with   • Back Pain     Began x1 month sharp 7/10 pain, consistent   • Malaise     Feeling weak/tired since dx of PNE last month, completed meds for PNE   • Shortness of Breath       HPI  Leodan Hyatt is a 37 y.o. male who presents to the emergency department with left-sided sharp pain in his back.  Started about a month ago, but has started to feel worse over the last couple weeks.  Has a cough which is nonproductive.  Feels some pain with breathing.  No hemoptysis, leg swelling or leg pain.  Denies abdominal pain nausea vomiting or diarrhea.  Patient    REVIEW OF SYSTEMS  As per HPI, otherwise a 10 point review of systems is negative    PAST MEDICAL HISTORY  Past Medical History:   Diagnosis Date   • ADHD (attention deficit hyperactivity disorder)    • Psychiatric problem     Mild Mental Retardation   • Tardive dyskinesia        SOCIAL HISTORY  Social History     Tobacco Use   • Smoking status: Former Smoker     Packs/day: 1.00     Types: Cigarettes     Start date: 2/27/2006   • Smokeless tobacco: Never Used   Vaping Use   • Vaping Use: Former   Substance Use Topics   • Alcohol use: Not Currently     Comment: \"maybe 3 times a year\"   • Drug use: No       SURGICAL HISTORY  Past Surgical History:   Procedure Laterality Date   • BLAIR RECTAL ABSCESS INCISION AND DRAINAGE N/A 2/26/2016    Procedure: BLAIR RECTAL ABSCESS INCISION AND DRAINAGE;  Surgeon: Shanna Lindo M.D.;  Location: SURGERY [facility];  Service:        CURRENT MEDICATIONS  Home Medications     Reviewed by Sadia Courtney R.N. (Registered Nurse) on 05/15/22 at 0517  Med List Status: Partial   Medication Last Dose Status   clonazePAM (KLONOPIN) 1 MG Tab  Active   nicotine (NICODERM) 7 MG/24HR PATCH 24 HR  Active   nicotine (NICOTROL) 10 MG inhaler  Active   propranolol (INDERAL) 10 MG Tab  Active   QUEtiapine Fumarate (SEROQUEL PO)  Active   Valbenazine Tosylate " "(INGREZZA) 80 MG Cap  Active                ALLERGIES  Allergies   Allergen Reactions   • Penicillins        PHYSICAL EXAM  VITAL SIGNS: /55   Pulse 87   Temp 36.1 °C (96.9 °F) (Temporal)   Resp 20   Ht 1.727 m (5' 8\")   Wt 57.2 kg (126 lb)   SpO2 94%   BMI 19.16 kg/m²    Constitutional: Awake and alert  HENT: Normal inspection  Eyes: Normal inspection  Neck: Grossly normal range of motion.  Cardiovascular: Normal heart rate, Normal rhythm.  Symmetric peripheral pulses.   Thorax & Lungs: No respiratory distress, No wheezing, No rales, No rhonchi, No chest tenderness.   Abdomen: Bowel sounds normal, soft, non-distended, nontender, no mass  Skin: No obvious rash.  Back: No tenderness, No CVA tenderness.   Extremities: No clubbing, cyanosis, edema, no Homans or cords.  Neurologic: Grossly normal   Psychiatric: Normal for situation    RADIOLOGY/PROCEDURES  CT-CTA CHEST PULMONARY ARTERY W/ RECONS   Final Result      1.  Negative for pulmonary embolism      2.  Left lower lobe multifocal airspace process most consistent with pneumonia      3.  Tiny airspace process in the right lower lobe and right upper lobe also probably pneumonia      4.  Multiple old right rib fracture            DX-CHEST-PORTABLE (1 VIEW)   Final Result         1.  No acute cardiopulmonary disease.           Imaging is interpreted by radiologist    Labs:  Results for orders placed or performed during the hospital encounter of 05/15/22   CBC with Differential   Result Value Ref Range    WBC 9.8 4.8 - 10.8 K/uL    RBC 4.88 4.70 - 6.10 M/uL    Hemoglobin 13.1 (L) 14.0 - 18.0 g/dL    Hematocrit 40.7 (L) 42.0 - 52.0 %    MCV 83.4 81.4 - 97.8 fL    MCH 26.8 (L) 27.0 - 33.0 pg    MCHC 32.2 (L) 33.7 - 35.3 g/dL    RDW 42.7 35.9 - 50.0 fL    Platelet Count 211 164 - 446 K/uL    MPV 10.5 9.0 - 12.9 fL    Neutrophils-Polys 73.20 (H) 44.00 - 72.00 %    Lymphocytes 15.30 (L) 22.00 - 41.00 %    Monocytes 9.70 0.00 - 13.40 %    Eosinophils 0.90 0.00 - " 6.90 %    Basophils 0.50 0.00 - 1.80 %    Immature Granulocytes 0.40 0.00 - 0.90 %    Nucleated RBC 0.00 /100 WBC    Neutrophils (Absolute) 7.14 1.82 - 7.42 K/uL    Lymphs (Absolute) 1.49 1.00 - 4.80 K/uL    Monos (Absolute) 0.95 (H) 0.00 - 0.85 K/uL    Eos (Absolute) 0.09 0.00 - 0.51 K/uL    Baso (Absolute) 0.05 0.00 - 0.12 K/uL    Immature Granulocytes (abs) 0.04 0.00 - 0.11 K/uL    NRBC (Absolute) 0.00 K/uL   Comp Metabolic Panel   Result Value Ref Range    Sodium 135 135 - 145 mmol/L    Potassium 4.1 3.6 - 5.5 mmol/L    Chloride 100 96 - 112 mmol/L    Co2 24 20 - 33 mmol/L    Anion Gap 11.0 7.0 - 16.0    Glucose 92 65 - 99 mg/dL    Bun 12 8 - 22 mg/dL    Creatinine 0.37 (L) 0.50 - 1.40 mg/dL    Calcium 8.9 8.5 - 10.5 mg/dL    AST(SGOT) 18 12 - 45 U/L    ALT(SGPT) 14 2 - 50 U/L    Alkaline Phosphatase 135 (H) 30 - 99 U/L    Total Bilirubin 0.4 0.1 - 1.5 mg/dL    Albumin 4.0 3.2 - 4.9 g/dL    Total Protein 6.8 6.0 - 8.2 g/dL    Globulin 2.8 1.9 - 3.5 g/dL    A-G Ratio 1.4 g/dL   D-DIMER   Result Value Ref Range    D-Dimer Screen 1.02 (H) 0.00 - 0.50 ug/mL (FEU)   ESTIMATED GFR   Result Value Ref Range    GFR (CKD-EPI) 147 >60 mL/min/1.73 m 2   EKG   Result Value Ref Range    Report       [facility]    Test Date:  2022-05-15  Pt Name:    RUTHIE LEES               Department: ER  MRN:        [id number]                      Room:  Gender:     Male                         Technician: [id number]  :        1985                   Requested By:ER TRIAGE PROTOCOL  Order #:    [id number]                    Casey MD: QUINN M PORSHA, MD    Measurements  Intervals                                Axis  Rate:       115                          P:          77  HI:         101                          QRS:        90  QRSD:       102                          T:          76  QT:         395  QTc:        548    Interpretive Statements  Sinus tachycardia  Right atrial enlargement  Borderline  right axis deviation  Minimal ST depression, inferior leads  Borderline ST elevation, anterolateral leads  Prolonged QT interval  Compared to ECG 08/23/2021 18:16:35  Atrial abnormality now present  No acute changes  Electronically Signed On 5- 8:51:56 PD T by QUINN STORY MD         Medications   cefdinir (OMNICEF) capsule 300 mg (has no administration in time range)   azithromycin (ZITHROMAX) tablet 500 mg (has no administration in time range)   iohexol (OMNIPAQUE) 350 mg/mL (IV) (48 mL Intravenous Given 5/15/22 0753)       COURSE & MEDICAL DECISION MAKING  She presents with left-sided chest pain that is pleuritic.  He has had a cough.  His EKG does not show ischemia.  He does have a history of fairly recent pneumonia.  He is not a very good historian because of his baseline medical issues.  Work-up is undertaken.    Laboratory data demonstrates elevated D-dimer.  Ordered CT pulmonary angiogram.  Chest x-ray was negative.  EKG without acute changes.  Laboratory data otherwise unremarkable.    CTA without evidence of PE.  Patient does have pneumonia.  This will be treated with antibiotics.  I have ordered Omnicef and azithromycin first dose in the ER.  He will be given a prescription for these.  He is nontoxic.  No evidence of sepsis.  He is not hypoxic.  No respiratory distress.  Appropriate for outpatient management.  I have advised return to ER for difficulty breathing, worsening symptoms, not improving or concern.  Given recurrent pneumonia certainly needs to follow-up with primary provider.      FINAL IMPRESSION  1.  Pneumonia      This dictation was created using voice recognition software. The accuracy of the dictation is limited to the abilities of the software.  The nursing notes were reviewed and certain aspects of this information were incorporated into this note.      Electronically signed by: Quinn Story M.D., 5/15/2022 8:50 AM

## 2022-05-15 NOTE — ED NOTES
Pt discharge home staff from [facility] will drive pt . Kimberly  given discharge instructions over the phone  and prescription sent to pharmacy. Kimberly verbalized understanding, all questions answered ,vss upon d/c. Pt steady on feet upon discharge

## 2022-05-20 ENCOUNTER — HOSPITAL ENCOUNTER (EMERGENCY)
Facility: MEDICAL CENTER | Age: 37
End: 2022-05-26
Attending: EMERGENCY MEDICINE
Payer: MEDICARE

## 2022-05-20 ENCOUNTER — APPOINTMENT (OUTPATIENT)
Dept: RADIOLOGY | Facility: MEDICAL CENTER | Age: 37
End: 2022-05-20
Attending: EMERGENCY MEDICINE
Payer: MEDICARE

## 2022-05-20 DIAGNOSIS — R45.1 AGITATION: ICD-10-CM

## 2022-05-20 DIAGNOSIS — G47.00 INSOMNIA, UNSPECIFIED TYPE: ICD-10-CM

## 2022-05-20 LAB
ALBUMIN SERPL BCP-MCNC: 3.5 G/DL (ref 3.2–4.9)
ALBUMIN/GLOB SERPL: 1.2 G/DL
ALP SERPL-CCNC: 113 U/L (ref 30–99)
ALT SERPL-CCNC: 14 U/L (ref 2–50)
AMORPH CRY #/AREA URNS HPF: PRESENT /HPF
AMPHET UR QL SCN: NEGATIVE
ANION GAP SERPL CALC-SCNC: 12 MMOL/L (ref 7–16)
APPEARANCE UR: ABNORMAL
AST SERPL-CCNC: 15 U/L (ref 12–45)
BACTERIA #/AREA URNS HPF: NEGATIVE /HPF
BARBITURATES UR QL SCN: NEGATIVE
BASOPHILS # BLD AUTO: 0.4 % (ref 0–1.8)
BASOPHILS # BLD: 0.05 K/UL (ref 0–0.12)
BENZODIAZ UR QL SCN: NEGATIVE
BILIRUB SERPL-MCNC: 0.2 MG/DL (ref 0.1–1.5)
BILIRUB UR QL STRIP.AUTO: NEGATIVE
BUN SERPL-MCNC: 14 MG/DL (ref 8–22)
BZE UR QL SCN: NEGATIVE
CALCIUM SERPL-MCNC: 8.4 MG/DL (ref 8.5–10.5)
CANNABINOIDS UR QL SCN: NEGATIVE
CHLORIDE SERPL-SCNC: 105 MMOL/L (ref 96–112)
CO2 SERPL-SCNC: 23 MMOL/L (ref 20–33)
COLOR UR: YELLOW
CREAT SERPL-MCNC: 0.36 MG/DL (ref 0.5–1.4)
EOSINOPHIL # BLD AUTO: 0.03 K/UL (ref 0–0.51)
EOSINOPHIL NFR BLD: 0.3 % (ref 0–6.9)
EPI CELLS #/AREA URNS HPF: NEGATIVE /HPF
ERYTHROCYTE [DISTWIDTH] IN BLOOD BY AUTOMATED COUNT: 41.7 FL (ref 35.9–50)
ETHANOL BLD-MCNC: <10.1 MG/DL
GFR SERPLBLD CREATININE-BSD FMLA CKD-EPI: 148 ML/MIN/1.73 M 2
GLOBULIN SER CALC-MCNC: 3 G/DL (ref 1.9–3.5)
GLUCOSE SERPL-MCNC: 114 MG/DL (ref 65–99)
GLUCOSE UR STRIP.AUTO-MCNC: NEGATIVE MG/DL
HCT VFR BLD AUTO: 39.8 % (ref 42–52)
HGB BLD-MCNC: 13.1 G/DL (ref 14–18)
IMM GRANULOCYTES # BLD AUTO: 0.04 K/UL (ref 0–0.11)
IMM GRANULOCYTES NFR BLD AUTO: 0.4 % (ref 0–0.9)
KETONES UR STRIP.AUTO-MCNC: ABNORMAL MG/DL
LEUKOCYTE ESTERASE UR QL STRIP.AUTO: NEGATIVE
LYMPHOCYTES # BLD AUTO: 1.59 K/UL (ref 1–4.8)
LYMPHOCYTES NFR BLD: 14.2 % (ref 22–41)
MCH RBC QN AUTO: 26.8 PG (ref 27–33)
MCHC RBC AUTO-ENTMCNC: 32.9 G/DL (ref 33.7–35.3)
MCV RBC AUTO: 81.6 FL (ref 81.4–97.8)
METHADONE UR QL SCN: NEGATIVE
MICRO URNS: ABNORMAL
MONOCYTES # BLD AUTO: 1.13 K/UL (ref 0–0.85)
MONOCYTES NFR BLD AUTO: 10.1 % (ref 0–13.4)
NEUTROPHILS # BLD AUTO: 8.33 K/UL (ref 1.82–7.42)
NEUTROPHILS NFR BLD: 74.6 % (ref 44–72)
NITRITE UR QL STRIP.AUTO: NEGATIVE
NRBC # BLD AUTO: 0 K/UL
NRBC BLD-RTO: 0 /100 WBC
OPIATES UR QL SCN: NEGATIVE
OXYCODONE UR QL SCN: NEGATIVE
PCP UR QL SCN: NEGATIVE
PH UR STRIP.AUTO: 5 [PH] (ref 5–8)
PLATELET # BLD AUTO: 274 K/UL (ref 164–446)
PMV BLD AUTO: 9.9 FL (ref 9–12.9)
POTASSIUM SERPL-SCNC: 3.7 MMOL/L (ref 3.6–5.5)
PROPOXYPH UR QL SCN: NEGATIVE
PROT SERPL-MCNC: 6.5 G/DL (ref 6–8.2)
PROT UR QL STRIP: NEGATIVE MG/DL
RBC # BLD AUTO: 4.88 M/UL (ref 4.7–6.1)
RBC # URNS HPF: ABNORMAL /HPF
RBC UR QL AUTO: NEGATIVE
SODIUM SERPL-SCNC: 140 MMOL/L (ref 135–145)
SP GR UR STRIP.AUTO: 1.02
URATE CRY #/AREA URNS HPF: POSITIVE /HPF
UROBILINOGEN UR STRIP.AUTO-MCNC: 0.2 MG/DL
WBC # BLD AUTO: 11.2 K/UL (ref 4.8–10.8)
WBC #/AREA URNS HPF: ABNORMAL /HPF

## 2022-05-20 PROCEDURE — 80307 DRUG TEST PRSMV CHEM ANLYZR: CPT

## 2022-05-20 PROCEDURE — 85025 COMPLETE CBC W/AUTO DIFF WBC: CPT

## 2022-05-20 PROCEDURE — 90791 PSYCH DIAGNOSTIC EVALUATION: CPT

## 2022-05-20 PROCEDURE — 82077 ASSAY SPEC XCP UR&BREATH IA: CPT

## 2022-05-20 PROCEDURE — A9270 NON-COVERED ITEM OR SERVICE: HCPCS | Performed by: EMERGENCY MEDICINE

## 2022-05-20 PROCEDURE — 302970 POC BREATHALIZER: Performed by: EMERGENCY MEDICINE

## 2022-05-20 PROCEDURE — 81001 URINALYSIS AUTO W/SCOPE: CPT | Mod: XU

## 2022-05-20 PROCEDURE — 99285 EMERGENCY DEPT VISIT HI MDM: CPT

## 2022-05-20 PROCEDURE — 71045 X-RAY EXAM CHEST 1 VIEW: CPT

## 2022-05-20 PROCEDURE — 700102 HCHG RX REV CODE 250 W/ 637 OVERRIDE(OP): Performed by: EMERGENCY MEDICINE

## 2022-05-20 PROCEDURE — 36415 COLL VENOUS BLD VENIPUNCTURE: CPT

## 2022-05-20 PROCEDURE — 80053 COMPREHEN METABOLIC PANEL: CPT

## 2022-05-20 RX ORDER — AZITHROMYCIN 250 MG/1
500 TABLET, FILM COATED ORAL DAILY
Status: COMPLETED | OUTPATIENT
Start: 2022-05-20 | End: 2022-05-22

## 2022-05-20 RX ORDER — CEFDINIR 300 MG/1
300 CAPSULE ORAL EVERY 12 HOURS
Status: COMPLETED | OUTPATIENT
Start: 2022-05-20 | End: 2022-05-23

## 2022-05-20 RX ADMIN — AZITHROMYCIN DIHYDRATE 500 MG: 250 TABLET, FILM COATED ORAL at 21:36

## 2022-05-20 RX ADMIN — CEFDINIR 300 MG: 300 CAPSULE ORAL at 21:36

## 2022-05-20 ASSESSMENT — FIBROSIS 4 INDEX: FIB4 SCORE: 0.84

## 2022-05-20 ASSESSMENT — ENCOUNTER SYMPTOMS
SHORTNESS OF BREATH: 0
ABDOMINAL PAIN: 0

## 2022-05-20 NOTE — ED PROVIDER NOTES
ED Provider Note    Primary care provider: Tom Aquino M.D.  Means of arrival: EMS  History obtained from: EMS/group home  History limited by: patient's cognitive delay    CHIEF COMPLAINT  Chief Complaint   Patient presents with   • Off Psych Meds       HPI  Leodan Hyatt is a 37 y.o. male who presents to the Emergency Department from his group home for evaluation of manic behavioral and behavioral disturbances.  Per group home over the last 3 days patient has been refusing taking any of his medications.  He was previously taken off psychiatric medications a few months ago due to tardive dyskinesia.  The tardive dyskinesia has started to resolve however over the last 3 days patient has developed behaviors that are abnormal for him.  He has been cleaning diapers filled with fecal matter at staff, refusing to take medication and per group home appears to be more manic.  He was evaluated in the emergency department for pneumonia on 5/15/2022, he stopped taking his antibiotics 3 days ago as well.  No other acute complaints at this time.  He was sent here for stabilization as they were unable to manage his behaviors at his group home.  Per EMS patient was slightly agitated when they picked him up and therefore they gave him a dose of Haldol.   they reported an akathetic reaction to the Haldol and therefore he was treated with Benadryl.  On my assessment patient is sleepy with no acute complaints.  Unclear what his cognitive baseline is but is not much of a historian.  He denies any pain.    REVIEW OF SYSTEMS  Review of Systems   Respiratory: Negative for shortness of breath.    Cardiovascular: Negative for chest pain.   Gastrointestinal: Negative for abdominal pain.     Review of systems limited by poor historian    PAST MEDICAL HISTORY   has a past medical history of ADHD (attention deficit hyperactivity disorder), Psychiatric problem, and Tardive dyskinesia.    SURGICAL HISTORY   has a past surgical  "history that includes sintia rectal abscess incision and drainage (N/A, 2/26/2016).    SOCIAL HISTORY  Social History     Tobacco Use   • Smoking status: Former Smoker     Packs/day: 1.00     Types: Cigarettes     Start date: 2/27/2006   • Smokeless tobacco: Never Used   Vaping Use   • Vaping Use: Former   Substance Use Topics   • Alcohol use: Not Currently     Comment: \"maybe 3 times a year\"   • Drug use: No      Social History     Substance and Sexual Activity   Drug Use No       FAMILY HISTORY  Family History   Problem Relation Age of Onset   • Bipolar disorder Brother    • Other Brother        CURRENT MEDICATIONS  Home Medications     Reviewed by Dimitry Mills R.N. (Registered Nurse) on 05/20/22 at 1600  Med List Status: <None>   Medication Last Dose Status   azithromycin (ZITHROMAX) 250 MG Tab  Active   cefdinir (OMNICEF) 300 MG Cap  Active   clonazePAM (KLONOPIN) 1 MG Tab  Active   nicotine (NICODERM) 7 MG/24HR PATCH 24 HR  Active   nicotine (NICOTROL) 10 MG inhaler  Active   propranolol (INDERAL) 10 MG Tab  Active   QUEtiapine Fumarate (SEROQUEL PO)  Active   Valbenazine Tosylate (INGREZZA) 80 MG Cap  Active                ALLERGIES  Allergies   Allergen Reactions   • Penicillins        PHYSICAL EXAM  VITAL SIGNS: /52   Pulse (!) 53   Temp 36.6 °C (97.8 °F)   Resp 16   Ht 1.727 m (5' 8\")   Wt 57.2 kg (126 lb 1.7 oz)   SpO2 98%   BMI 19.17 kg/m²   Vitals reviewed by myself.  Physical Exam  Nursing note and vitals reviewed.  Constitutional: Well-developed and well-nourished. No acute distress.  Sleepy but arousable  HENT: Head is normocephalic and atraumatic.  Eyes: extra-ocular movements intact  Cardiovascular: Bradycardic rate and  regular rhythm. No murmur heard.  Pulmonary/Chest: Breath sounds normal. No wheezes or rales.   Abdominal: Soft and non-tender. No distention.    Musculoskeletal: Extremities exhibit normal range of motion without edema or tenderness.   Neurological: Awake and alert, no " focal neurologic deficits  Skin: Skin is warm and dry. No rash.         DIAGNOSTIC STUDIES /  LABS  Labs Reviewed   CBC WITH DIFFERENTIAL - Abnormal; Notable for the following components:       Result Value    WBC 11.2 (*)     Hemoglobin 13.1 (*)     Hematocrit 39.8 (*)     MCH 26.8 (*)     MCHC 32.9 (*)     Neutrophils-Polys 74.60 (*)     Lymphocytes 14.20 (*)     Neutrophils (Absolute) 8.33 (*)     Monos (Absolute) 1.13 (*)     All other components within normal limits   COMP METABOLIC PANEL - Abnormal; Notable for the following components:    Glucose 114 (*)     Creatinine 0.36 (*)     Calcium 8.4 (*)     Alkaline Phosphatase 113 (*)     All other components within normal limits   URINALYSIS,CULTURE IF INDICATED - Abnormal; Notable for the following components:    Character Turbid (*)     Ketones Trace (*)     All other components within normal limits    Narrative:     Indication for culture:->Patient WITHOUT an indwelling Jessica  catheter in place with new onset of Dysuria, Frequency,  Urgency, and/or Suprapubic pain   URINE MICROSCOPIC (W/UA) - Abnormal; Notable for the following components:    WBC Rare (*)     All other components within normal limits    Narrative:     Indication for culture:->Patient WITHOUT an indwelling Jessica  catheter in place with new onset of Dysuria, Frequency,  Urgency, and/or Suprapubic pain   URINE DRUG SCREEN    Narrative:     Indication for culture:->Patient WITHOUT an indwelling Jessica  catheter in place with new onset of Dysuria, Frequency,  Urgency, and/or Suprapubic pain   DIAGNOSTIC ALCOHOL   ESTIMATED GFR   POC BREATHALIZER       All labs reviewed by me.      RADIOLOGY  DX-CHEST-PORTABLE (1 VIEW)   Final Result      No acute cardiopulmonary abnormality identified.        The radiologist's interpretation of all radiological studies have been reviewed by me.          COURSE & MEDICAL DECISION MAKING  Nursing notes, VS, PMSFHx reviewed in chart.    Patient is a 37-year-old male  who comes in for evaluation of behavioral disturbances.  Differential diagnosis includes medication noncompliance, underlying psychosis, mood disorder NOS, infection.  Diagnostic work-up includes labs, chest x-ray and urinalysis.    Patient's initial vitals are within normal limits, he is calm and cooperative after receiving Haldol and Benadryl by EMS.  Given recent pneumonia I did elect to obtain labs and chest x-ray, chest x-ray demonstrates no obvious cardiopulmonary processes, however his recent pneumonia was not identified on chest x-ray was identified on CT.  Patient does have a slight leukocytosis of 11.2 likely being off of his antibiotics therefore I will complete his course of cefdinir and azithromycin as he has 3 days left in his course we will do this.  Labs are otherwise unremarkable.  Urinalysis is negative for infection.  Urine drug screen is negative.  No organic etiology to explain his worsening behaviors.  Therefore behavioral health team evaluated patient and he will be placed on a legal hold as group home is unable to care for him.  At this time we will have psychiatry evaluate the patient tomorrow to see if they are able to help with medical stabilization.  If not patient may require transfer to inpatient psychiatric facility.  Patient is on a legal hold and pending further evaluation.    Patient admitted to ED Observations Status at 9:20PM on 5/20/22  for stabilization of behavioral disturbances  .        FINAL IMPRESSION  1. Agitation

## 2022-05-20 NOTE — ED TRIAGE NOTES
"BIBA from group home for \"psychosis\".  Per EMS pt has been off psych meds for 1 month d/t tardive dyskinsia.  Group home called EMS for pt \"acting strange\".  5 IV haldol and 50 IV benadryl given by EMS. Pt arrives cooperative    "

## 2022-05-21 PROCEDURE — 700102 HCHG RX REV CODE 250 W/ 637 OVERRIDE(OP): Performed by: EMERGENCY MEDICINE

## 2022-05-21 PROCEDURE — A9270 NON-COVERED ITEM OR SERVICE: HCPCS | Performed by: EMERGENCY MEDICINE

## 2022-05-21 PROCEDURE — 51798 US URINE CAPACITY MEASURE: CPT

## 2022-05-21 RX ORDER — CLONAZEPAM 0.5 MG/1
2 TABLET ORAL ONCE
Status: COMPLETED | OUTPATIENT
Start: 2022-05-21 | End: 2022-05-21

## 2022-05-21 RX ORDER — ARIPIPRAZOLE 10 MG/1
5 TABLET ORAL DAILY
Status: DISCONTINUED | OUTPATIENT
Start: 2022-05-21 | End: 2022-05-24

## 2022-05-21 RX ORDER — PROPRANOLOL HYDROCHLORIDE 10 MG/1
10 TABLET ORAL 2 TIMES DAILY
Status: DISCONTINUED | OUTPATIENT
Start: 2022-05-21 | End: 2022-05-26 | Stop reason: HOSPADM

## 2022-05-21 RX ORDER — QUETIAPINE FUMARATE 25 MG/1
25 TABLET, FILM COATED ORAL NIGHTLY
Status: DISCONTINUED | OUTPATIENT
Start: 2022-05-21 | End: 2022-05-24

## 2022-05-21 RX ADMIN — QUETIAPINE FUMARATE 25 MG: 25 TABLET ORAL at 21:51

## 2022-05-21 RX ADMIN — VALBENAZINE 80 MG: 80 CAPSULE ORAL at 17:15

## 2022-05-21 RX ADMIN — AZITHROMYCIN DIHYDRATE 500 MG: 250 TABLET, FILM COATED ORAL at 17:29

## 2022-05-21 RX ADMIN — ARIPIPRAZOLE 5 MG: 10 TABLET ORAL at 17:30

## 2022-05-21 RX ADMIN — CLONAZEPAM 2 MG: 0.5 TABLET ORAL at 13:05

## 2022-05-21 RX ADMIN — CEFDINIR 300 MG: 300 CAPSULE ORAL at 17:28

## 2022-05-21 RX ADMIN — CEFDINIR 300 MG: 300 CAPSULE ORAL at 07:55

## 2022-05-21 NOTE — ED NOTES
Report received from Akira CASE, assumed care of patient.  Bed in lowest position.  Tele-sitter in room.

## 2022-05-21 NOTE — DISCHARGE PLANNING
DIDIER asked by ERP to contact Pt's Group Home and requested Pt's Imgrezza be brought to the hospital since we do not care that medication.  DIDIER spoke with Valente the  [phone]) who stated that he could bring the medication to the ER.  Didier updated ER RN.

## 2022-05-21 NOTE — ED NOTES
Pt laying in bed starring at the ceiling, no signs of distress noted, tele sitter at bedside, will continue to monitor

## 2022-05-21 NOTE — PROGRESS NOTES
"ED Provider Progress Note    ED Observation Progress Note    Date of Service: 05/21/22    Interval History  Patient reevaluated.  Patient is on a legal hold, waiting transfer to psychiatric facility when a bed is available.  Please refer to initial note for complete details.  No concerns overnight.  Patient ambulates to the bathroom independently and tolerated a meal.  Medication reconciliation has been reviewed, home medications including antibiotics have been reordered.    Physical Exam  /75   Pulse 88   Temp 36.6 °C (97.8 °F)   Resp 16   Ht 1.727 m (5' 8\")   Wt 57.2 kg (126 lb 1.7 oz)   SpO2 97%   BMI 19.17 kg/m² .    Constitutional: Awake and alert. Nontoxic  HENT:  Grossly normal  Eyes: Grossly normal  Neck: Normal range of motion  Cardiovascular: Normal peripheral perfusion  Thorax & Lungs: Nonlabored respirations  Skin: Warm and dry  Extremities: No deformities noted  Psychiatric: Odd affect.  Agitated but redirectable.    Labs  Results for orders placed or performed during the hospital encounter of 05/20/22   URINE DRUG SCREEN   Result Value Ref Range    Amphetamines Urine Negative Negative    Barbiturates Negative Negative    Benzodiazepines Negative Negative    Cocaine Metabolite Negative Negative    Methadone Negative Negative    Opiates Negative Negative    Oxycodone Negative Negative    Phencyclidine -Pcp Negative Negative    Propoxyphene Negative Negative    Cannabinoid Metab Negative Negative   CBC WITH DIFFERENTIAL   Result Value Ref Range    WBC 11.2 (H) 4.8 - 10.8 K/uL    RBC 4.88 4.70 - 6.10 M/uL    Hemoglobin 13.1 (L) 14.0 - 18.0 g/dL    Hematocrit 39.8 (L) 42.0 - 52.0 %    MCV 81.6 81.4 - 97.8 fL    MCH 26.8 (L) 27.0 - 33.0 pg    MCHC 32.9 (L) 33.7 - 35.3 g/dL    RDW 41.7 35.9 - 50.0 fL    Platelet Count 274 164 - 446 K/uL    MPV 9.9 9.0 - 12.9 fL    Neutrophils-Polys 74.60 (H) 44.00 - 72.00 %    Lymphocytes 14.20 (L) 22.00 - 41.00 %    Monocytes 10.10 0.00 - 13.40 %    Eosinophils " 0.30 0.00 - 6.90 %    Basophils 0.40 0.00 - 1.80 %    Immature Granulocytes 0.40 0.00 - 0.90 %    Nucleated RBC 0.00 /100 WBC    Neutrophils (Absolute) 8.33 (H) 1.82 - 7.42 K/uL    Lymphs (Absolute) 1.59 1.00 - 4.80 K/uL    Monos (Absolute) 1.13 (H) 0.00 - 0.85 K/uL    Eos (Absolute) 0.03 0.00 - 0.51 K/uL    Baso (Absolute) 0.05 0.00 - 0.12 K/uL    Immature Granulocytes (abs) 0.04 0.00 - 0.11 K/uL    NRBC (Absolute) 0.00 K/uL   Comp Metabolic Panel   Result Value Ref Range    Sodium 140 135 - 145 mmol/L    Potassium 3.7 3.6 - 5.5 mmol/L    Chloride 105 96 - 112 mmol/L    Co2 23 20 - 33 mmol/L    Anion Gap 12.0 7.0 - 16.0    Glucose 114 (H) 65 - 99 mg/dL    Bun 14 8 - 22 mg/dL    Creatinine 0.36 (L) 0.50 - 1.40 mg/dL    Calcium 8.4 (L) 8.5 - 10.5 mg/dL    AST(SGOT) 15 12 - 45 U/L    ALT(SGPT) 14 2 - 50 U/L    Alkaline Phosphatase 113 (H) 30 - 99 U/L    Total Bilirubin 0.2 0.1 - 1.5 mg/dL    Albumin 3.5 3.2 - 4.9 g/dL    Total Protein 6.5 6.0 - 8.2 g/dL    Globulin 3.0 1.9 - 3.5 g/dL    A-G Ratio 1.2 g/dL   URINALYSIS,CULTURE IF INDICATED    Specimen: Urine   Result Value Ref Range    Color Yellow     Character Turbid (A)     Specific Gravity 1.023 <1.035    Ph 5.0 5.0 - 8.0    Glucose Negative Negative mg/dL    Ketones Trace (A) Negative mg/dL    Protein Negative Negative mg/dL    Bilirubin Negative Negative    Urobilinogen, Urine 0.2 Negative    Nitrite Negative Negative    Leukocyte Esterase Negative Negative    Occult Blood Negative Negative    Micro Urine Req Microscopic    DIAGNOSTIC ALCOHOL   Result Value Ref Range    Diagnostic Alcohol <10.1 <10.1 mg/dL   ESTIMATED GFR   Result Value Ref Range    GFR (CKD-EPI) 148 >60 mL/min/1.73 m 2   URINE MICROSCOPIC (W/UA)   Result Value Ref Range    WBC Rare (A) /hpf    RBC Rare /hpf    Bacteria Negative None /hpf    Epithelial Cells Negative /hpf    Amorphous Crystal Present /hpf    Uric Acid Crystal Positive /hpf       Radiology  DX-CHEST-PORTABLE (1 VIEW)   Final  Result      No acute cardiopulmonary abnormality identified.          Problem List  1.  Agitation, history of schizophrenia -behavioral disturbance makes him unable to return to his group home.  Awaiting placement in psychiatric facility when a bed is available.      Electronically signed by: Sally Lau D.O., 5/21/2022 12:34 PM

## 2022-05-21 NOTE — CONSULTS
"[facility] PSYCHIATRIC CONSULT:Intake  Reason for admission: developmentally disabled with behaviors as noted below  Consulting Physician/APN/PA: Rosendo Petit M.D  Reason for Consult: refusing meds, running around naked, agressive  Consultant: Kasia Goodrich MD    Legal Status on hold     CC: no reply  HPI: 38 yo male who is developmentally disabled but is pleasant and chatty at baseline. He is currently very delayed in talking, keeps looking around the room as if seeing things. He is distracted. He answered only a few questions: he is sad, he sees and hears things, he is at [facility] and it is 1985. He has a bald area on the back of his head and does not know how he got it or that it was there.    Called group home. Pt was dx with PNA, came here and given antibiotics which he completed about 1 month ago. Then he \"aspirtated\" and came back to [facility] week ago and given more antibiotics. Back at the group home, a few days later he suddenly stopped the meds and his behavior changed.   His Baseline is more social, talkative.     Chart(s) Review:  None on file    Medical ROS:  Review of systems (+10 systems)   Psychiatric Exam (MSE):  Vitals:Blood pressure 120/70, pulse 88, temperature 36.6 °C (97.8 °F), resp. rate 16, height 1.727 m (5' 8\"), weight 57.2 kg (126 lb 1.7 oz), SpO2 97 %.    General Appearance: thin, beard, bald spot on the back of his head, intermittent eye contact, appears to be responding to into stimuli.   Musculoskeletal: lying in bed  Alert/Orientation: to self and hospital  Attn/Concentration: distracted  Fund of Knowledge: unable to test  Memory recent/remote: unable to determine  Speech: minimal, one word answers  Language: dysarthric, slurred (speech impediment)  Thought Content: appears to be responding to internal stimuli,     Thought Process: slowed, distraccted  Insight/Judgement:impaired  Mood: \"sad\"  Affect: blunted    Past Medical Hx:     Past Medical History: "   Diagnosis Date   • ADHD (attention deficit hyperactivity disorder)    • Psychiatric problem     Mild Mental Retardation   • Tardive dyskinesia          Past Psychiatric Hx: unable to determine      Family Psych Hx:  Family History   Problem Relation Age of Onset   • Bipolar disorder Brother    • Other Brother        Social Hx:  Housing: group home    Drugs/Alcohol: none known    Labs:  Lab Results   Component Value Date/Time    AMPHUR Negative 05/20/2022 2020    BARBSURINE Negative 05/20/2022 2020    BENZODIAZU Negative 05/20/2022 2020    COCAINEMET Negative 05/20/2022 2020    METHADONE Negative 05/20/2022 2020    OPIATES Negative 05/20/2022 2020    OXYCODN Negative 05/20/2022 2020    PCPURINE Negative 05/20/2022 2020    PROPOXY Negative 05/20/2022 2020    CANNABINOID Negative 05/20/2022 2020     Recent Labs     05/20/22 1949   WBC 11.2*   RBC 4.88   HEMOGLOBIN 13.1*   HEMATOCRIT 39.8*   MCV 81.6   MCH 26.8*   RDW 41.7   PLATELETCT 274   MPV 9.9   NEUTSPOLYS 74.60*   LYMPHOCYTES 14.20*   MONOCYTES 10.10   EOSINOPHILS 0.30   BASOPHILS 0.40     Recent Labs     05/20/22 1949   SODIUM 140   POTASSIUM 3.7   CHLORIDE 105   CO2 23   GLUCOSE 114*   BUN 14       Cranial Imaging: personally reviewed  Cranial MRI:  Read as chronic microvascular ischemia    EKG: QTc: 548     Meds Current:  Scheduled Medications   Medication Dose Frequency   • QUEtiapine  25 mg Nightly   • clonazePAM  2 mg Once   • propranolol  10 mg BID   • cefdinir  300 mg Q12HRS   • azithromycin  500 mg DAILY     Allergies: Penicillins      Assessement    1. Developmental disability  2 Psychotic disorder unspc  3 Depressive disorder unspc  4 Hx of TDK    Medical:   -prolonged QTc 548  -L shift: ? From recent PNA per group home?       Recommendations:  Legal Status: extended    Observation status:   -line of site with sitter    Visitors: yes   Personal belongings: no    Discussed/voalted: ABENA Lau DO    Medication and Other Recommendations: final orders as  per Tx Tm  1.  we do not have ingrezza: perhaps the group home could bring it in.   2. Agree with other meds  3. Until and if ingrezza brought in: increase seroquel to 50 mg tid  4. Pt may not take meds. If required use ativan, benadryl, and/or geodon IM for agreession    Will continue to follow with you.    Thank you for the consult.    Discharge recommendations: inpt psych

## 2022-05-21 NOTE — ED NOTES
Patient's home medications have been reviewed by the pharmacy team.     Past Medical History:   Diagnosis Date   • ADHD (attention deficit hyperactivity disorder)    • Psychiatric problem     Mild Mental Retardation   • Tardive dyskinesia        Patient's Medications   New Prescriptions    No medications on file   Previous Medications    AZITHROMYCIN (ZITHROMAX) 250 MG TAB    Take two tabs by mouth on day one, then one tab by mouth daily on days 2-5.    CEFDINIR (OMNICEF) 300 MG CAP    Take 1 Capsule by mouth in the morning and 1 Capsule in the evening. Do all this for 7 days.    CLONAZEPAM (KLONOPIN) 2 MG TABLET    Take 2 mg by mouth 2 times a day as needed (Shaking).    NICOTINE (NICODERM) 7 MG/24HR PATCH 24 HR    Place 1 Patch on the skin every 24 hours.    NICOTINE (NICOTROL) 10 MG INHALER    Inhale 1 Puff as needed for Smoking Cessation. Use 1-4 cartiages per day to taper off smoking    PROPRANOLOL (INDERAL) 10 MG TAB    Take 10 mg by mouth 2 times a day.    QUETIAPINE (SEROQUEL) 25 MG TAB    Take 25 mg by mouth at bedtime. Indications: Trouble Sleeping    VALBENAZINE TOSYLATE (INGREZZA) 80 MG CAP    Take 80 mg by mouth every day. Indications: Tardive Dyskinesia   Modified Medications    No medications on file   Discontinued Medications    No medications on file          A:  Medications do not appear to be contributing to current complaints.     P:    No recommendations at this time. Home medications have been reordered as appropriate.    Ingrid Zaragoza, PharmD, BCPS, BCCCP

## 2022-05-21 NOTE — ED NOTES
Med rec completed per pt's caregiver   Allergies reviewed    Pt started a Z-Mika and a 7 day course of Cefdinir on 5/15/2022    Per caregiver pt took his meds yesterday morning but had refused all meds on Wednesday and Thursday

## 2022-05-21 NOTE — DISCHARGE PLANNING
Spoke to [facility] [phone]. Sees Dr. Robin Sky for pysch meds and has no PCP. States client takes:  Seroquel 25mg QHS  Clonazepam 2mg BID PRN  Propranolol 10mg BID  Clonidine 0.1mg BID-this was prescribed but never started.   Ingrezza 80mg daily

## 2022-05-21 NOTE — ED NOTES
"Pt resting comfortably on gurney.  Pt states \" the back of my head hurts. \"   Upon inspection, multiple abrasions noted occipital region.   "

## 2022-05-21 NOTE — ED NOTES
Pt laying in bed, eyes closed, equal chest rise noted, no signs of distress noted, tele sitter at bedside, will continue to monitor

## 2022-05-21 NOTE — CONSULTS
[facility]   TRIAGE ASSESSMENT    Name: Leodan Hyatt  MRN: [id number]  : 1985  Age: 37 y.o.  Date of assessment: 2022  PCP: Tom Aquino M.D.  Persons in attendance: Patient  Patient Location: [facility]    CHIEF COMPLAINT/PRESENTING ISSUE (as stated by patient): Pt is a 36 y/o male brought in from group home for manic behaviors and behavioral disturbances. Per [facility]ente  at [facility], pt has been exhibiting worsening behavioral disturbances over the last few days. Pt started refusing his medications two days ago, walking around group home naked, having aggressive behavior, and flinging diapers with fecal matter at staff. Pt has been off psychiatric medications x1 year due to tardive dyskinesia, but still takes seroquel q HS. When assessed by this writer, pt was nonverbal and had a blank stare when asked questions. The group home is requesting medication stabilization w/ psychiatric meds before returning to group home. At this time, inpatient psychiatric referrals have not been sent; pending psychiatric evaluation first. Legal hold was initiated. Findings discussed with ERP who agrees with plan of care.   Chief Complaint   Patient presents with   • Off Psych Meds        CURRENT LIVING SITUATION/SOCIAL SUPPORT/FINANCIAL RESOURCES: Pt resides in group home. Valente, , [phone].     BEHAVIORAL HEALTH/SUBSTANCE USE TREATMENT HISTORY  Does patient/parent report a history of prior behavioral health/substance use treatment for patient?   Unable to assess; pt is nonverbal.    SAFETY ASSESSMENT - SELF  Does patient acknowledge current or past symptoms of dangerousness to self or is previous history noted? Unable to assess; pt is nonverbal.  Does parent/significant other report patient has current or past symptoms of dangerousness to self? N\A  Does presenting problem suggest symptoms of dangerousness to self? Yes; due to possible  psychosis.    SAFETY ASSESSMENT - OTHERS  Does patient acknowledge current or past symptoms of aggressive behavior or risk to others or is previous history noted? Yes; increasing aggressive behaviors noted per group home.  Does parent/significant other report patient has current or past symptoms of aggressive behavior or risk to others?  N\A  Does presenting problem suggest symptoms of dangerousness to others? Yes; due to possible psychosis.    LEGAL HISTORY  Does patient acknowledge history of arrest/MCC/custodial or is previous history noted? Unable to assess; pt nonverbal.      Crisis Safety Plan completed and copy given to patient? N\A    ABUSE/NEGLECT SCREENING  Does patient report feeling “unsafe” in his/her home, or afraid of anyone? Unable to assess; pt nonverbal.  Does patient report any history of physical, sexual, or emotional abuse? Unable to assess; pt nonverbal.  Does parent or significant other report any of the above? N\A  Is there evidence of neglect by self?  yes  Is there evidence of neglect by a caregiver? no  Does the patient/parent report any history of CPS/APS/police involvement related to suspected abuse/neglect or domestic violence? Unable to assess; pt nonverbal.Based on the information provided during the current assessment, is a mandated report of suspected abuse/neglect being made?  No    SUBSTANCE USE SCREENING       UDS results: negative  Breathalyzer results: <10.1          MENTAL STATUS   Participation: No verbal participation  Grooming: Disheveled  Orientation: Unable to assess; pt nonverbal.  Behavior: Agitated  Eye contact: Intense  Mood: Unable to assess; pt nonverbal.  Affect: Unable to assess; pt nonverbal.  Thought process: Unable to assess; pt nonverbal.  Thought content: Unable to assess; pt nonverbal.  Speech: Unable to assess; pt nonverbal.  Perception: Unable to assess; pt nonverbal.  Memory:  Unable to assess; pt nonverbal.  Insight: Unable to assess; pt  nonverbal.  Judgment:  Unable to assess; pt nonverbal.  Other:    Collateral information:   Source:  [] Significant other present in person:   [x] Valente , by telephone: [phone]  [] [facility]   [x] [facility] Nursing Staff  [x] [facility] Medical Record  [x] Other: ERP    [] Unable to complete full assessment due to:  [] Acute intoxication  [] Patient declined to participate/engage  [x] Patient verbally unresponsive  [] Significant cognitive deficits  [] Significant perceptual distortions or behavioral disorganization  [] Other:      CLINICAL IMPRESSIONS:  Primary:  Behavioral disturbance  Secondary:  Agitation       IDENTIFIED NEEDS/PLAN:  [Trigger DISPOSITION list for any items marked]    [x]  Imminent safety risk - self [x] Imminent safety risk - others   []  Acute substance withdrawal [x]  Psychosis/Impaired reality testing   [x]  Mood/anxiety []  Substance use/Addictive behavior   [x]  Maladaptive behaviro []  Parent/child conflict   []  Family/Couples conflict []  Biomedical   []  Housing []  Financial   []   Legal  Occupational/Educational   []  Domestic violence []  Other:     Recommended Plan of Care:  Actively being addressed by Legal Hold and [facility] and 1:1 Observation. Pt resides at group home who reports pt has been exhibiting worsening behavioral disturbances over the last few days. Pt started refusing his medications two days ago, walking around group home naked, having aggressive behavior. Group home requesting medication stabilization w/ psychiatric meds before returning to group home. At this time, inpatient psychiatric referrals have not been sent; pending psychiatric evaluation first. Legal hold was initiated. Findings discussed with ERP who agrees with plan of care.       Has the Recommended Plan of Care/Level of Observation been reviewed with the patient's assigned nurse? Yes; telesitter ordered for elopement risk; Dimitry CASE    Does patient/parent or  guardian express agreement with the above plan? Unable to assess if pt agrees with plan of care.     Referral appointment(s) scheduled? N\A    Alert team only:   I have discussed findings and recommendations with Dr. Blair who is in agreement with these recommendations.     Referral information sent to the following outpatient community providers : N/A    Referral information sent to the following inpatient community providers : Have not been sent at this time; pending psychiatry evaluation.     If applicable : Referred  to  Alert Team for legal hold follow up at (time): 5/20/2022 @ 2120      Cat Cooper R.N.  5/20/2022

## 2022-05-21 NOTE — ED PROVIDER NOTES
ED Provider Note    ERP addendum    Psychiatry has seen the patient this morning and extended his legal hold and recommended that he start on Abilify 5 mg so I have ordered that on a daily basis.  In addition psychiatry recommends that the patient's group home bring in his Ingrezza since we do not stock that medication at this hospital.  I have contacted  and they will contact the group home and request that this be brought to the ER.  I have reviewed this with the ER pharmacist and the medication should be taken in directly to the ER pharmacy and they will control distribution and confirm dosing and administration.

## 2022-05-21 NOTE — ED NOTES
Pt resting on gurney.  Pt alert and oriented X 2, name and place only.  + distant stare/gaze.  Pt calm and cooperative at this time.  Pt takes AM medication.

## 2022-05-21 NOTE — ED NOTES
Patients Jhoan Bishop called and is asking for an update. RN is currently unavailable and will call back when free. [phone]

## 2022-05-21 NOTE — ED NOTES
Unable to obtain Med rec   Called Group Home at [phone] with neg answer  Unable to obtain a MAR at this time

## 2022-05-22 LAB — POC BREATHALIZER: 0 PERCENT (ref 0–0.01)

## 2022-05-22 PROCEDURE — A9270 NON-COVERED ITEM OR SERVICE: HCPCS | Performed by: EMERGENCY MEDICINE

## 2022-05-22 PROCEDURE — 700102 HCHG RX REV CODE 250 W/ 637 OVERRIDE(OP): Performed by: EMERGENCY MEDICINE

## 2022-05-22 RX ADMIN — CEFDINIR 300 MG: 300 CAPSULE ORAL at 05:13

## 2022-05-22 RX ADMIN — PROPRANOLOL HYDROCHLORIDE 10 MG: 10 TABLET ORAL at 18:00

## 2022-05-22 RX ADMIN — ARIPIPRAZOLE 5 MG: 10 TABLET ORAL at 05:13

## 2022-05-22 RX ADMIN — AZITHROMYCIN DIHYDRATE 500 MG: 250 TABLET, FILM COATED ORAL at 18:00

## 2022-05-22 RX ADMIN — QUETIAPINE FUMARATE 25 MG: 25 TABLET ORAL at 21:00

## 2022-05-22 RX ADMIN — CEFDINIR 300 MG: 300 CAPSULE ORAL at 18:00

## 2022-05-22 RX ADMIN — VALBENAZINE 80 MG: 80 CAPSULE ORAL at 05:15

## 2022-05-22 NOTE — ED NOTES
Report from MANPREET Chao. POC discussed. Tele sitter in constant view of pt. Pt in bed eating breakfast tray.

## 2022-05-22 NOTE — ED NOTES
Pt in full view of telesitter at all times. Pt on legal hold inability to care for self. Pt provided with breakfast tray, pt updated on POC. Report given to MANPREET Wyman

## 2022-05-22 NOTE — ED NOTES
Summary nursing note from 1930 - 2230:  Patient has been up to bathroom with RN assistance numerous times over the last three hours. Sometimes voids a small amount, other times is unable to void But states he feels the need to void. Bladder scan does not indicate retention. Given night med at 2151 while patient was eating dinner. At 2215, Patient was assisted to bedside commode to void. Patient had a large firm bowel movement. Returned the bed afterwards without incident. At this time, patient is resting comfortably, denies need to void. Will continue to assess and treat as appropriate.

## 2022-05-22 NOTE — ED NOTES
Pt in full view of telesitter at all times. Pt on legal hold inability to care for self. Pt able to transfer to bedside commode with 1 person assist. Grandmother updated via telephone.

## 2022-05-22 NOTE — PROGRESS NOTES
"ED Provider Progress Note    ED Observation Progress Note    Date of Service: 05/22/22    Interval History  Patient reevaluated.  Patient is on a legal hold, waiting transfer to psychiatric facility when a bed is available.  Please refer to initial note for complete details.  No concerns overnight.  Patient ambulates to the bathroom independently and tolerated a meal.  Medication reconciliation has been reviewed.    Physical Exam  /65   Pulse 81   Temp 36.7 °C (98 °F) (Temporal)   Resp 18   Ht 1.727 m (5' 8\")   Wt 57.2 kg (126 lb 1.7 oz)   SpO2 97%   BMI 19.17 kg/m² .    Constitutional: Awake and alert. Nontoxic  HENT:  Grossly normal  Eyes: Grossly normal  Neck: Normal range of motion  Cardiovascular: Normal peripheral perfusion.  Thorax & Lungs: Nonlabored respiration  Skin:  No pathologic rash.   Extremities: No deformities noted  Psychiatric: Flat affect.    Labs  Results for orders placed or performed during the hospital encounter of 05/20/22   URINE DRUG SCREEN   Result Value Ref Range    Amphetamines Urine Negative Negative    Barbiturates Negative Negative    Benzodiazepines Negative Negative    Cocaine Metabolite Negative Negative    Methadone Negative Negative    Opiates Negative Negative    Oxycodone Negative Negative    Phencyclidine -Pcp Negative Negative    Propoxyphene Negative Negative    Cannabinoid Metab Negative Negative   CBC WITH DIFFERENTIAL   Result Value Ref Range    WBC 11.2 (H) 4.8 - 10.8 K/uL    RBC 4.88 4.70 - 6.10 M/uL    Hemoglobin 13.1 (L) 14.0 - 18.0 g/dL    Hematocrit 39.8 (L) 42.0 - 52.0 %    MCV 81.6 81.4 - 97.8 fL    MCH 26.8 (L) 27.0 - 33.0 pg    MCHC 32.9 (L) 33.7 - 35.3 g/dL    RDW 41.7 35.9 - 50.0 fL    Platelet Count 274 164 - 446 K/uL    MPV 9.9 9.0 - 12.9 fL    Neutrophils-Polys 74.60 (H) 44.00 - 72.00 %    Lymphocytes 14.20 (L) 22.00 - 41.00 %    Monocytes 10.10 0.00 - 13.40 %    Eosinophils 0.30 0.00 - 6.90 %    Basophils 0.40 0.00 - 1.80 %    Immature " Granulocytes 0.40 0.00 - 0.90 %    Nucleated RBC 0.00 /100 WBC    Neutrophils (Absolute) 8.33 (H) 1.82 - 7.42 K/uL    Lymphs (Absolute) 1.59 1.00 - 4.80 K/uL    Monos (Absolute) 1.13 (H) 0.00 - 0.85 K/uL    Eos (Absolute) 0.03 0.00 - 0.51 K/uL    Baso (Absolute) 0.05 0.00 - 0.12 K/uL    Immature Granulocytes (abs) 0.04 0.00 - 0.11 K/uL    NRBC (Absolute) 0.00 K/uL   Comp Metabolic Panel   Result Value Ref Range    Sodium 140 135 - 145 mmol/L    Potassium 3.7 3.6 - 5.5 mmol/L    Chloride 105 96 - 112 mmol/L    Co2 23 20 - 33 mmol/L    Anion Gap 12.0 7.0 - 16.0    Glucose 114 (H) 65 - 99 mg/dL    Bun 14 8 - 22 mg/dL    Creatinine 0.36 (L) 0.50 - 1.40 mg/dL    Calcium 8.4 (L) 8.5 - 10.5 mg/dL    AST(SGOT) 15 12 - 45 U/L    ALT(SGPT) 14 2 - 50 U/L    Alkaline Phosphatase 113 (H) 30 - 99 U/L    Total Bilirubin 0.2 0.1 - 1.5 mg/dL    Albumin 3.5 3.2 - 4.9 g/dL    Total Protein 6.5 6.0 - 8.2 g/dL    Globulin 3.0 1.9 - 3.5 g/dL    A-G Ratio 1.2 g/dL   URINALYSIS,CULTURE IF INDICATED    Specimen: Urine   Result Value Ref Range    Color Yellow     Character Turbid (A)     Specific Gravity 1.023 <1.035    Ph 5.0 5.0 - 8.0    Glucose Negative Negative mg/dL    Ketones Trace (A) Negative mg/dL    Protein Negative Negative mg/dL    Bilirubin Negative Negative    Urobilinogen, Urine 0.2 Negative    Nitrite Negative Negative    Leukocyte Esterase Negative Negative    Occult Blood Negative Negative    Micro Urine Req Microscopic    DIAGNOSTIC ALCOHOL   Result Value Ref Range    Diagnostic Alcohol <10.1 <10.1 mg/dL   ESTIMATED GFR   Result Value Ref Range    GFR (CKD-EPI) 148 >60 mL/min/1.73 m 2   URINE MICROSCOPIC (W/UA)   Result Value Ref Range    WBC Rare (A) /hpf    RBC Rare /hpf    Bacteria Negative None /hpf    Epithelial Cells Negative /hpf    Amorphous Crystal Present /hpf    Uric Acid Crystal Positive /hpf       Radiology  DX-CHEST-PORTABLE (1 VIEW)   Final Result      No acute cardiopulmonary abnormality identified.           Problem List  1.  Agitation, behavioral disturbance, history of schizophrenia- -group home no longer able to care for him.  Awaiting transfer to psychiatric facility when a bed is available.      Electronically signed by: Sally Lau D.O., 5/22/2022 12:29 PM

## 2022-05-22 NOTE — ED NOTES
Pt's vital signs updated, pt resting in bed. Pacing in room, tele sitter remains in constant view of the pt.

## 2022-05-22 NOTE — ED NOTES
Pt in full view of telesitter at all times. Pt on legal hold inability to care for self. Pt resting in gurney, no acute distress noted.

## 2022-05-22 NOTE — CONSULTS
"  [facility] PSYCHIATRIC FOLLOW-UP:(established)  *Reason for admission:  developmentally disabled  refusing meds, running around naked, aggressive   *Legal Hold Status: on legal hold                S:  Pt is better today, talks and smiles. He denies AVH. He denies feeling sad.  He can't tell me what has been going on. He has taken his meds here. He says \"someone needs to put them in my mouth\".  Slept. No behaviors documented in last 24 hours.    O: Medical ROS (as pertinent):                      *Psychiatric Examination:   Vitals:   Vitals:    05/21/22 1722 05/21/22 2145 05/22/22 0509 05/22/22 1100   BP:  (!) 96/55 (!) 93/55 106/65   Pulse: (!) 59 88 79 81   Resp:  16 15 18   Temp:   36.6 °C (97.9 °F) 36.7 °C (98 °F)   TempSrc:   Temporal Temporal   SpO2:  95% 98% 97%   Weight:       Height:         General Appearance:  good eye contact  Abnormal Movements: none   Gait and Posture: not observed  Speech: within normal limits  Thought Process: normal rate  Associations:   linear  Abnormal or Psychotic Thoughts: none  Judgement and Insight: improved  Orientation: grossly intact except he thinks it is [place]  Recent and Remote Memory: grossly intact  Attention Span and Concentration: intact  Language:fluent but dysarthric/slurred  Fund of Knowledge: deficient  Mood and Affect: euthymic  SI/HI:  suicidal - no and homicidal - no    Current Medications:  Scheduled Medications   Medication Dose Frequency   • QUEtiapine  25 mg Nightly   • propranolol  10 mg BID   • ARIPiprazole  5 mg DAILY   • Valbenazine Tosylate  80 mg DAILY   • cefdinir  300 mg Q12HRS   • azithromycin  500 mg DAILY      *ASSESSMENT/RECOMENDATIONS:  1. Developmental disability  2 Psychotic disorder unspc  3 Depressive disorder unspc  4 Hx of TDK     Medical:   -prolonged QTc 548  -L shift: ? From recent PNA per group home?    Legal hold: extended  Observation status:   Observation status:   -line of site with sitter     Visitors: yes "   Personal belongings: no     Medication and Other Recommendations: final orders as per Tx Tm  1. If he continues to present well today,he can be released in the am back to the group. He was here recently and sent back to [facility], would like to make sure this improvement is consistent.  2. Would also consider continuing the ability on dc.     Will continue to follow with you.         Discharge recommendations: as noted.

## 2022-05-22 NOTE — ED NOTES
Patient asleep in NAD. Laying on right side. Breathing regular, nonlabored. Will continue to assess and treat as appropriate. Telesitter in use.

## 2022-05-23 LAB — EKG IMPRESSION: NORMAL

## 2022-05-23 PROCEDURE — 700102 HCHG RX REV CODE 250 W/ 637 OVERRIDE(OP): Performed by: EMERGENCY MEDICINE

## 2022-05-23 PROCEDURE — A9270 NON-COVERED ITEM OR SERVICE: HCPCS | Performed by: EMERGENCY MEDICINE

## 2022-05-23 PROCEDURE — 93005 ELECTROCARDIOGRAM TRACING: CPT | Performed by: EMERGENCY MEDICINE

## 2022-05-23 RX ORDER — HYDROCODONE BITARTRATE AND ACETAMINOPHEN 5; 325 MG/1; MG/1
1 TABLET ORAL ONCE
Status: COMPLETED | OUTPATIENT
Start: 2022-05-23 | End: 2022-05-23

## 2022-05-23 RX ORDER — IBUPROFEN 600 MG/1
600 TABLET ORAL ONCE
Status: COMPLETED | OUTPATIENT
Start: 2022-05-23 | End: 2022-05-23

## 2022-05-23 RX ORDER — CLONAZEPAM 0.5 MG/1
2 TABLET ORAL ONCE
Status: COMPLETED | OUTPATIENT
Start: 2022-05-23 | End: 2022-05-23

## 2022-05-23 RX ADMIN — PROPRANOLOL HYDROCHLORIDE 10 MG: 10 TABLET ORAL at 06:23

## 2022-05-23 RX ADMIN — HYDROCODONE BITARTRATE AND ACETAMINOPHEN 1 TABLET: 5; 325 TABLET ORAL at 20:00

## 2022-05-23 RX ADMIN — CLONAZEPAM 2 MG: 0.5 TABLET ORAL at 22:35

## 2022-05-23 RX ADMIN — ARIPIPRAZOLE 5 MG: 10 TABLET ORAL at 06:23

## 2022-05-23 RX ADMIN — PROPRANOLOL HYDROCHLORIDE 10 MG: 10 TABLET ORAL at 19:04

## 2022-05-23 RX ADMIN — HYDROCODONE BITARTRATE AND ACETAMINOPHEN 1 TABLET: 5; 325 TABLET ORAL at 01:45

## 2022-05-23 RX ADMIN — IBUPROFEN 600 MG: 600 TABLET, FILM COATED ORAL at 15:09

## 2022-05-23 RX ADMIN — CEFDINIR 300 MG: 300 CAPSULE ORAL at 06:23

## 2022-05-23 RX ADMIN — VALBENAZINE 80 MG: 80 CAPSULE ORAL at 06:00

## 2022-05-23 RX ADMIN — QUETIAPINE FUMARATE 25 MG: 25 TABLET ORAL at 20:13

## 2022-05-23 NOTE — ED NOTES
Pt continues to be anxious despite medication administration and meal tray. Requiring frequent redirection back to room. Tele sitter remains at bedside.

## 2022-05-23 NOTE — ED NOTES
Pt provided clean sheets and gown, pt educated on staying in room and waiting for staff assistance

## 2022-05-23 NOTE — DISCHARGE PLANNING
HTH/SCP TCN chart review completed. Collaborated with Montrell Sagastume Adele and ALERT TEAM  Margot prior to meeting with the pt. The most current review of medical record, knowledge of pt's PLOF and social support, LACE+ score of 33, 6 clicks scores of ( none entered)  mobility were considered. Medical record notes that mbr came from a Group Home ( GH name not stated) . Mbr has PCP Dr Tom Aquino. TCN noted that drug  Seroquel and Abilify  are ordered during this ED admission.    Pt seen at bedside, very somnolent . Therefore, TCN unable to iIntroduce TCN program, unable to  provided education regarding differences in post acute resources including IRF, SNF and HH. I spoke with Alert Jaci Frost regarding mbr.     Deferring this mbr to [facility] Alert Team . No additional TCN needs identified at this time . TCN will continue to follow and collaborate with discharge planning team as additional post acute needs arise. Thank you.       Choice forms obtained: NONE  [facility] services ( n/a )   TCN Call [phone] to set up PCP appointment

## 2022-05-23 NOTE — ED NOTES
Pt resting on gurney awake with unlabored even chest rise and fall, Tele sitter in place, will continue to monitor.

## 2022-05-23 NOTE — PROGRESS NOTES
"ED Provider Progress Note    ED Observation Progress Note    Date of Service: 05/23/22    Interval History  Patient continues to hold in the emergency department awaiting placement at an inpatient psychiatric treatment facility.  Been no acute issues and overnight.  No complaints.    Physical Exam  /60   Pulse 66   Temp 37.1 °C (98.8 °F) (Temporal)   Resp 16   Ht 1.727 m (5' 8\")   Wt 57.2 kg (126 lb 1.7 oz)   SpO2 95%   BMI 19.17 kg/m² .    Constitutional: Awake and alert. Nontoxic  HENT:  Grossly normal  Eyes: Grossly normal  Neck: Normal range of motion  Cardiovascular: Normal heart rate   Thorax & Lungs: No respiratory distress  Abdomen: Nontender  Skin:  No pathologic rash.   Extremities: Well perfused  Psychiatric: Odd, flat affect.  Cooperative.    Labs  Results for orders placed or performed during the hospital encounter of 05/20/22   URINE DRUG SCREEN   Result Value Ref Range    Amphetamines Urine Negative Negative    Barbiturates Negative Negative    Benzodiazepines Negative Negative    Cocaine Metabolite Negative Negative    Methadone Negative Negative    Opiates Negative Negative    Oxycodone Negative Negative    Phencyclidine -Pcp Negative Negative    Propoxyphene Negative Negative    Cannabinoid Metab Negative Negative   CBC WITH DIFFERENTIAL   Result Value Ref Range    WBC 11.2 (H) 4.8 - 10.8 K/uL    RBC 4.88 4.70 - 6.10 M/uL    Hemoglobin 13.1 (L) 14.0 - 18.0 g/dL    Hematocrit 39.8 (L) 42.0 - 52.0 %    MCV 81.6 81.4 - 97.8 fL    MCH 26.8 (L) 27.0 - 33.0 pg    MCHC 32.9 (L) 33.7 - 35.3 g/dL    RDW 41.7 35.9 - 50.0 fL    Platelet Count 274 164 - 446 K/uL    MPV 9.9 9.0 - 12.9 fL    Neutrophils-Polys 74.60 (H) 44.00 - 72.00 %    Lymphocytes 14.20 (L) 22.00 - 41.00 %    Monocytes 10.10 0.00 - 13.40 %    Eosinophils 0.30 0.00 - 6.90 %    Basophils 0.40 0.00 - 1.80 %    Immature Granulocytes 0.40 0.00 - 0.90 %    Nucleated RBC 0.00 /100 WBC    Neutrophils (Absolute) 8.33 (H) 1.82 - 7.42 K/uL "    Lymphs (Absolute) 1.59 1.00 - 4.80 K/uL    Monos (Absolute) 1.13 (H) 0.00 - 0.85 K/uL    Eos (Absolute) 0.03 0.00 - 0.51 K/uL    Baso (Absolute) 0.05 0.00 - 0.12 K/uL    Immature Granulocytes (abs) 0.04 0.00 - 0.11 K/uL    NRBC (Absolute) 0.00 K/uL   Comp Metabolic Panel   Result Value Ref Range    Sodium 140 135 - 145 mmol/L    Potassium 3.7 3.6 - 5.5 mmol/L    Chloride 105 96 - 112 mmol/L    Co2 23 20 - 33 mmol/L    Anion Gap 12.0 7.0 - 16.0    Glucose 114 (H) 65 - 99 mg/dL    Bun 14 8 - 22 mg/dL    Creatinine 0.36 (L) 0.50 - 1.40 mg/dL    Calcium 8.4 (L) 8.5 - 10.5 mg/dL    AST(SGOT) 15 12 - 45 U/L    ALT(SGPT) 14 2 - 50 U/L    Alkaline Phosphatase 113 (H) 30 - 99 U/L    Total Bilirubin 0.2 0.1 - 1.5 mg/dL    Albumin 3.5 3.2 - 4.9 g/dL    Total Protein 6.5 6.0 - 8.2 g/dL    Globulin 3.0 1.9 - 3.5 g/dL    A-G Ratio 1.2 g/dL   URINALYSIS,CULTURE IF INDICATED    Specimen: Urine   Result Value Ref Range    Color Yellow     Character Turbid (A)     Specific Gravity 1.023 <1.035    Ph 5.0 5.0 - 8.0    Glucose Negative Negative mg/dL    Ketones Trace (A) Negative mg/dL    Protein Negative Negative mg/dL    Bilirubin Negative Negative    Urobilinogen, Urine 0.2 Negative    Nitrite Negative Negative    Leukocyte Esterase Negative Negative    Occult Blood Negative Negative    Micro Urine Req Microscopic    DIAGNOSTIC ALCOHOL   Result Value Ref Range    Diagnostic Alcohol <10.1 <10.1 mg/dL   ESTIMATED GFR   Result Value Ref Range    GFR (CKD-EPI) 148 >60 mL/min/1.73 m 2   URINE MICROSCOPIC (W/UA)   Result Value Ref Range    WBC Rare (A) /hpf    RBC Rare /hpf    Bacteria Negative None /hpf    Epithelial Cells Negative /hpf    Amorphous Crystal Present /hpf    Uric Acid Crystal Positive /hpf   POC BREATHALIZER   Result Value Ref Range    POC Breathalizer 0.00 0.00 - 0.01 Percent       Radiology  DX-CHEST-PORTABLE (1 VIEW)   Final Result      No acute cardiopulmonary abnormality identified.          Problem List  1.  Agitation            Electronically signed by: Isaias Manzano M.D., 5/23/2022 11:35 AM

## 2022-05-23 NOTE — ED NOTES
Pt resting on gurney with unlabored even chest rise and fall, awakes easily to stimuli, Tele sitter in place, will continue to monitor.

## 2022-05-24 PROCEDURE — A9270 NON-COVERED ITEM OR SERVICE: HCPCS | Performed by: EMERGENCY MEDICINE

## 2022-05-24 PROCEDURE — 700102 HCHG RX REV CODE 250 W/ 637 OVERRIDE(OP): Performed by: EMERGENCY MEDICINE

## 2022-05-24 RX ORDER — ARIPIPRAZOLE 10 MG/1
10 TABLET ORAL DAILY
Status: DISCONTINUED | OUTPATIENT
Start: 2022-05-25 | End: 2022-05-26 | Stop reason: HOSPADM

## 2022-05-24 RX ORDER — ACETAMINOPHEN 325 MG/1
650 TABLET ORAL ONCE
Status: COMPLETED | OUTPATIENT
Start: 2022-05-24 | End: 2022-05-24

## 2022-05-24 RX ORDER — IBUPROFEN 600 MG/1
600 TABLET ORAL ONCE
Status: COMPLETED | OUTPATIENT
Start: 2022-05-24 | End: 2022-05-24

## 2022-05-24 RX ADMIN — VALBENAZINE 80 MG: 80 CAPSULE ORAL at 06:33

## 2022-05-24 RX ADMIN — ACETAMINOPHEN 650 MG: 325 TABLET ORAL at 18:46

## 2022-05-24 RX ADMIN — PROPRANOLOL HYDROCHLORIDE 10 MG: 10 TABLET ORAL at 06:27

## 2022-05-24 RX ADMIN — IBUPROFEN 600 MG: 600 TABLET, FILM COATED ORAL at 06:27

## 2022-05-24 RX ADMIN — IBUPROFEN 600 MG: 600 TABLET, FILM COATED ORAL at 21:06

## 2022-05-24 RX ADMIN — PROPRANOLOL HYDROCHLORIDE 10 MG: 10 TABLET ORAL at 18:46

## 2022-05-24 RX ADMIN — ARIPIPRAZOLE 5 MG: 10 TABLET ORAL at 06:27

## 2022-05-24 NOTE — ED NOTES
Handoff report received from Gaby CASE for continuity of care.    Patient resting on gurney. No acute distress. Active chest rise noted. No agitation noted. No behavioral pain indicators. Tele sitter in direct view of patient. Will continue to monitor pt.

## 2022-05-24 NOTE — ED NOTES
Patient continues to sleep. No acute distress noted and breathing is non-labored. Active chest rise noted. No agitation noted. No behavioral pain indicators. One to one sitter in direct view of patient.

## 2022-05-24 NOTE — ED NOTES
[place] No acute distress noted and breathing is non-labored. Active chest rise noted. No agitation noted. No behavioral pain indicators. One to one sitter in direct view of patient. Will continue to monitor pt.

## 2022-05-24 NOTE — ED NOTES
Patient sleeping in [facility]. No acute distress. Active chest rise noted with non-labored breathing. No agitation noted. No behavioral pain indicators. One to one sitter in direct view of patient. Will continue to monitor.

## 2022-05-24 NOTE — PROGRESS NOTES
"ED Provider Progress Note    ED Observation Progress Note    Date of Service: 05/24/22  Interval History     Patient seen and evaluated by psych today and recommended discontinuing Seroquel and increasing Abilify.  Still not quite appropriate for discharge to group home.  We will continue to follow.  Patient otherwise has been stable throughout time in the emergency department and will be hopefully sent to group home once psych signs off .  Transfer to oncoming ER physician in guarded condition    Physical Exam  /62   Pulse 66   Temp 36 °C (96.8 °F) (Temporal)   Resp 20   Ht 1.727 m (5' 8\")   Wt 57.2 kg (126 lb 1.7 oz)   SpO2 99%   BMI 19.17 kg/m² .    Constitutional: Awake and alert. Nontoxic  HENT:  Grossly normal  Eyes: Grossly normal  Neck: Normal range of motion  Cardiovascular: Normal heart rate   Thorax & Lungs: No respiratory distress  Abdomen: Nontender  Skin:  No pathologic rash.   Extremities: Well perfused  Psychiatric: Affect normal    Labs  Results for orders placed or performed during the hospital encounter of 05/20/22   URINE DRUG SCREEN   Result Value Ref Range    Amphetamines Urine Negative Negative    Barbiturates Negative Negative    Benzodiazepines Negative Negative    Cocaine Metabolite Negative Negative    Methadone Negative Negative    Opiates Negative Negative    Oxycodone Negative Negative    Phencyclidine -Pcp Negative Negative    Propoxyphene Negative Negative    Cannabinoid Metab Negative Negative   CBC WITH DIFFERENTIAL   Result Value Ref Range    WBC 11.2 (H) 4.8 - 10.8 K/uL    RBC 4.88 4.70 - 6.10 M/uL    Hemoglobin 13.1 (L) 14.0 - 18.0 g/dL    Hematocrit 39.8 (L) 42.0 - 52.0 %    MCV 81.6 81.4 - 97.8 fL    MCH 26.8 (L) 27.0 - 33.0 pg    MCHC 32.9 (L) 33.7 - 35.3 g/dL    RDW 41.7 35.9 - 50.0 fL    Platelet Count 274 164 - 446 K/uL    MPV 9.9 9.0 - 12.9 fL    Neutrophils-Polys 74.60 (H) 44.00 - 72.00 %    Lymphocytes 14.20 (L) 22.00 - 41.00 %    Monocytes 10.10 0.00 - " 13.40 %    Eosinophils 0.30 0.00 - 6.90 %    Basophils 0.40 0.00 - 1.80 %    Immature Granulocytes 0.40 0.00 - 0.90 %    Nucleated RBC 0.00 /100 WBC    Neutrophils (Absolute) 8.33 (H) 1.82 - 7.42 K/uL    Lymphs (Absolute) 1.59 1.00 - 4.80 K/uL    Monos (Absolute) 1.13 (H) 0.00 - 0.85 K/uL    Eos (Absolute) 0.03 0.00 - 0.51 K/uL    Baso (Absolute) 0.05 0.00 - 0.12 K/uL    Immature Granulocytes (abs) 0.04 0.00 - 0.11 K/uL    NRBC (Absolute) 0.00 K/uL   Comp Metabolic Panel   Result Value Ref Range    Sodium 140 135 - 145 mmol/L    Potassium 3.7 3.6 - 5.5 mmol/L    Chloride 105 96 - 112 mmol/L    Co2 23 20 - 33 mmol/L    Anion Gap 12.0 7.0 - 16.0    Glucose 114 (H) 65 - 99 mg/dL    Bun 14 8 - 22 mg/dL    Creatinine 0.36 (L) 0.50 - 1.40 mg/dL    Calcium 8.4 (L) 8.5 - 10.5 mg/dL    AST(SGOT) 15 12 - 45 U/L    ALT(SGPT) 14 2 - 50 U/L    Alkaline Phosphatase 113 (H) 30 - 99 U/L    Total Bilirubin 0.2 0.1 - 1.5 mg/dL    Albumin 3.5 3.2 - 4.9 g/dL    Total Protein 6.5 6.0 - 8.2 g/dL    Globulin 3.0 1.9 - 3.5 g/dL    A-G Ratio 1.2 g/dL   URINALYSIS,CULTURE IF INDICATED    Specimen: Urine   Result Value Ref Range    Color Yellow     Character Turbid (A)     Specific Gravity 1.023 <1.035    Ph 5.0 5.0 - 8.0    Glucose Negative Negative mg/dL    Ketones Trace (A) Negative mg/dL    Protein Negative Negative mg/dL    Bilirubin Negative Negative    Urobilinogen, Urine 0.2 Negative    Nitrite Negative Negative    Leukocyte Esterase Negative Negative    Occult Blood Negative Negative    Micro Urine Req Microscopic    DIAGNOSTIC ALCOHOL   Result Value Ref Range    Diagnostic Alcohol <10.1 <10.1 mg/dL   ESTIMATED GFR   Result Value Ref Range    GFR (CKD-EPI) 148 >60 mL/min/1.73 m 2   URINE MICROSCOPIC (W/UA)   Result Value Ref Range    WBC Rare (A) /hpf    RBC Rare /hpf    Bacteria Negative None /hpf    Epithelial Cells Negative /hpf    Amorphous Crystal Present /hpf    Uric Acid Crystal Positive /hpf   POC BREATHALIZER   Result Value  Ref Range    POC Breathalizer 0.00 0.00 - 0.01 Percent   EKG   Result Value Ref Range    Report       [facility] Emergency Dept.    Test Date:  2022  Pt Name:    RUTHIE LEES               Department: ER  MRN:        [id number]                      Room:       [facility]  Gender:     Male                         Technician: [id number]  :        1985                   Requested By:MALATHI STOUT  Order #:    [id number]                    Reading MD:    Measurements  Intervals                                Axis  Rate:       78                           P:          68  WY:         126                          QRS:        76  QRSD:       100                          T:          46  QT:         382  QTc:        436    Interpretive Statements  Sinus rhythm  Compared to ECG 05/15/2022 05:31:05  Sinus tachycardia no longer present  Atrial abnormality no longer present  ST (T wave) deviation no longer present  Prolonged QT interval no longer present         Radiology  DX-CHEST-PORTABLE (1 VIEW)   Final Result      No acute cardiopulmonary abnormality identified.          Problem List  1.  Agitation  2.  Schizophrenia      Electronically signed by: Rosendo Petit M.D., 2022 3:33 PM

## 2022-05-24 NOTE — ED NOTES
Pt is restless and pacing in his room. ERP is made aware. Pt medicated as per MAR. Will continue to monitor pt. One to one sitter in direct view of pt.

## 2022-05-24 NOTE — ED NOTES
Pt alert and awake at this time. Pt medicated as per MAR. Patient resting on gurney. No acute distress note with stable VS. Active chest rise noted. No agitation noted. No behavioral pain indicators. One to one sitter in direct view of patient. Will continue to monitor pt.

## 2022-05-24 NOTE — DISCHARGE PLANNING
[facility] ALERT TEAM DISCHARGE PLANNING NOTE    Date:  5/23/  Patient Name:  Leodan Hyatt - 37 y.o. - Discharge Planning  MRN:  [id number]   YOB: 1985  ADMISSION DATE:  5/20/2022      Writer forwarded transfer packet for inpatient psychiatric care to the following community providers:  [facility], [facility], [facility]   Items  included in the transfer packet:   __x___Face Sheet   __x___Pages 1 and 2 of completed legal hold   __x___Alert Team/Psych Assessment   __x___H&P   __x___UDS   __x___Blood Alcohol   __x___Vital signs   __n/a___Pregnancy Test (if applicable)   __x___Medications List   __x___Covid Screen

## 2022-05-24 NOTE — ED NOTES
Patient sleeping. No acute distress. Breathing is non-labored with active chest rise noted. No agitation noted. No behavioral pain indicators. One to one sitter in direct view of patient. Will continue to monitor pt.

## 2022-05-24 NOTE — ED NOTES
Patient remains asleep. No apparent acute distress noted at this time. Active chest rise noted with non-labored breathing. No agitation noted. No behavioral pain indicators. One to one sitter in direct view of patient. Will continue to monitor pt.

## 2022-05-24 NOTE — ED NOTES
Patient sleeping at this time. No acute distress noted. Breathing is non-labored with active chest rise noted. No agitation noted. No behavioral pain indicators. One to one sitter in direct view of patient. Will continue to monitor pt.

## 2022-05-24 NOTE — ED NOTES
Patient resting on gurney. No acute distress. Active chest rise noted. No agitation noted. No behavioral pain indicators. One to one sitter in direct view of patient at this time.    Tele sitter notified that pt now have a one to one sitter.

## 2022-05-24 NOTE — ED NOTES
Patient sleeping at this time. No acute distress at this time and breathing is non-labored. Active chest rise noted. No agitation noted. No behavioral pain indicators. One to one sitter in direct view of patient.

## 2022-05-24 NOTE — DISCHARGE PLANNING
Phone call to Devora Smith to follow up on pt's hold expiring. Psych to follow up with ERP for ongoing coordination of care.

## 2022-05-24 NOTE — CONSULTS
"  [facility] PSYCHIATRIC FOLLOW-UP:(established)  *Reason for admission:  developmentally disabled  refusing meds, running around naked, aggressive   *Legal Hold Status: on legal hold                S:  Slept well last night. Denies SI/HI.  Wants to speak with his mom, his nurse was made aware to provide him with a phone. Still hears voices telling him to \"kill myself\" He is not going to listen to the voices. Sometimes depressed and anxious but not at the time of this assessment. His appetite is very good.     Received a call from Margot Gonzalez, she is not sure why patient was on hold, \"does not feel he should have been on a legal hold as he was not going to be referred to inpatient psych anyway, he is from a group home and has the ability to care for himself\"        O: Medical ROS (as pertinent):                      *Psychiatric Examination:   Vitals:   Vitals:    05/23/22 1100 05/23/22 1559 05/23/22 1948 05/24/22 0625   BP: 107/60 100/62 (!) 93/51 103/62   Pulse: 66 95 74 66   Resp: 16 18 20 20   Temp: 37.1 °C (98.8 °F) 36.3 °C (97.3 °F) 36.6 °C (97.9 °F) 36 °C (96.8 °F)   TempSrc: Temporal Temporal Temporal Temporal   SpO2: 95% 98% 94% 99%   Weight:       Height:         General Appearance:  good eye contact  Abnormal Movements: none   Gait and Posture: not observed  Speech: within normal limits  Thought Process: normal rate  Associations:   linear  Abnormal or Psychotic Thoughts: none  Judgement and Insight: improved  Orientation: grossly intact except he thinks it is Hortensia  Recent and Remote Memory: grossly intact  Attention Span and Concentration: intact  Language:fluent but dysarthric/slurred  Fund of Knowledge: Not assessed  Mood and Affect: euthymic  SI/HI:  suicidal - no and homicidal - no    Current Medications:  Scheduled Medications   Medication Dose Frequency   • QUEtiapine  25 mg Nightly   • propranolol  10 mg BID   • ARIPiprazole  5 mg DAILY   • Valbenazine Tosylate  80 mg DAILY "      *ASSESSMENT/RECOMENDATIONS:  1. Developmental disability  2 Psychotic disorder unspc  3 Depressive disorder unspc  4 Hx of TDK     Medical:   -prolonged QTc 548  -L shift: ? From recent PNA per group home?    Legal hold: Will  today. No need to extend         Medication and Other Recommendations:   Discontinue Seroquel. Patient has a QTc of 548.  Increase Abilify to 10mg P.O daily.  -Psych CL will continue following patient while at [facility].  -Medication reconciliation was completed.  -Reviewed safety plan - 911, ER, PCM, MHC, Suicide Crisis Line, Nursing staff while    inpatient.  -Visitors: Yes  -Personal Belongings: No  -Observation Level: Tele monitor    -Instruction: Discharge recommendations per treatment team    Discussed with Dr. Rosendo Petit and Margot Cortes (CM)   [place]

## 2022-05-25 PROCEDURE — A9270 NON-COVERED ITEM OR SERVICE: HCPCS | Performed by: EMERGENCY MEDICINE

## 2022-05-25 PROCEDURE — 700102 HCHG RX REV CODE 250 W/ 637 OVERRIDE(OP): Performed by: EMERGENCY MEDICINE

## 2022-05-25 RX ORDER — HYDROXYZINE HYDROCHLORIDE 25 MG/1
25 TABLET, FILM COATED ORAL ONCE
Status: COMPLETED | OUTPATIENT
Start: 2022-05-25 | End: 2022-05-25

## 2022-05-25 RX ORDER — ACETAMINOPHEN 325 MG/1
650 TABLET ORAL ONCE
Status: COMPLETED | OUTPATIENT
Start: 2022-05-25 | End: 2022-05-25

## 2022-05-25 RX ORDER — TRAZODONE HYDROCHLORIDE 50 MG/1
50 TABLET ORAL
Status: DISCONTINUED | OUTPATIENT
Start: 2022-05-25 | End: 2022-05-26 | Stop reason: HOSPADM

## 2022-05-25 RX ORDER — CHOLECALCIFEROL (VITAMIN D3) 125 MCG
10 CAPSULE ORAL NIGHTLY
Status: DISCONTINUED | OUTPATIENT
Start: 2022-05-25 | End: 2022-05-26 | Stop reason: HOSPADM

## 2022-05-25 RX ADMIN — ACETAMINOPHEN 650 MG: 325 TABLET ORAL at 16:41

## 2022-05-25 RX ADMIN — HYDROXYZINE HYDROCHLORIDE 25 MG: 25 TABLET, FILM COATED ORAL at 16:41

## 2022-05-25 RX ADMIN — ARIPIPRAZOLE 10 MG: 10 TABLET ORAL at 06:00

## 2022-05-25 RX ADMIN — VALBENAZINE 80 MG: 80 CAPSULE ORAL at 06:00

## 2022-05-25 RX ADMIN — TRAZODONE HYDROCHLORIDE 50 MG: 50 TABLET ORAL at 20:09

## 2022-05-25 RX ADMIN — Medication 10 MG: at 20:09

## 2022-05-25 NOTE — DISCHARGE PLANNING
Deferring this mbr to Hospital Alert Team. Mbr with Diagnosis of Agitation, Schizophrenia on Legal Hold. 1:1 sitter present.TCN chart review completed, patient seen at bedside, introduced TCN program. Mbr is not receptive.

## 2022-05-25 NOTE — ED NOTES
Pt amb with steady gait to bathroom and back with tech. Sitter in direct view of pt at all times.

## 2022-05-25 NOTE — ED NOTES
Pt reports that he was up all night. Remains restless and unable to sleep. Requesting medication to assist with sleep. ERP notified. No orders.

## 2022-05-25 NOTE — ED NOTES
Received report from Nusrat CASE and assumed care of pt. Pt resting in room. Sitter in direct view of pt at all times.

## 2022-05-25 NOTE — ED NOTES
Report from MANPREET Moreira  Patient ambulating around room calmly, NAD, given dinner tray and tolerating well.  Patient continues on 1:1 observation for patient safety, sitter in LOS.

## 2022-05-25 NOTE — ED NOTES
Patient continues to be chatty and unable to sleep, resp even and unlabored, NAD.  Patient continues on 1:1 observation.

## 2022-05-25 NOTE — CONSULTS
[facility] PSYCHIATRIC FOLLOW-UP:(established)  *Reason for admission:  developmentally disabled  refusing meds, running around naked, aggressive   *Legal Hold Status: on legal hold                S:  Per nursing and patient, he did not sleep at all last night, only had about an hour of sleep, as his Seroquel was discontinued yesterday due to QTc of 548. Denies SI/HI.   Requesting a sleep aid. Will recommend Trazodone and Melatonin.       O: Medical ROS (as pertinent):                      *Psychiatric Examination:   Vitals:   Vitals:    05/23/22 1948 05/24/22 0625 05/24/22 1850 05/25/22 0500   BP: (!) 93/51 103/62 118/76 (!) 98/60   Pulse: 74 66 74 87   Resp: 20 20 16 16   Temp: 36.6 °C (97.9 °F) 36 °C (96.8 °F)     TempSrc: Temporal Temporal     SpO2: 94% 99% 98% 96%   Weight:       Height:         General Appearance:  good eye contact  Abnormal Movements: none   Gait and Posture: not observed  Speech: within normal limits  Thought Process: normal rate  Associations:   linear  Abnormal or Psychotic Thoughts: none  Judgement and Insight: improved  Orientation: grossly intact except he thinks it is Hortensia  Recent and Remote Memory: grossly intact  Attention Span and Concentration: intact  Language:fluent but dysarthric/slurred  Fund of Knowledge: Not assessed  Mood and Affect: euthymic  SI/HI:  suicidal - no and homicidal - no    Current Medications:  Scheduled Medications   Medication Dose Frequency   • ARIPiprazole  10 mg DAILY   • propranolol  10 mg BID   • Valbenazine Tosylate  80 mg DAILY      *ASSESSMENT/RECOMENDATIONS:  1. Developmental disability  2 Psychotic disorder unspc  3 Depressive disorder unspc  4 Hx of TDK     Medical:   -prolonged QTc 548  -L shift: ? From recent PNA per group home?    Legal hold: Not on hold         Medication and Other Recommendations:   Start:  1: Trazodone 50mg at bedtime for sleep  2: Melatonin 10mg at bedtime for sleep.   -Psych CL will continue following  patient while at [facility].  -Medication reconciliation was completed.  -Reviewed safety plan - 911, ER, PCM, MHC, Suicide Crisis Line, Nursing staff while    inpatient.  -Visitors: Yes  -Personal Belongings: No  -Observation Level: Tele monitor    -Instruction: Discharge recommendations per treatment team    Discussed with Dr. Brennen Machado

## 2022-05-25 NOTE — ED NOTES
Patient continues to be chatty and unable to sleep, increasingly disoriented and has become unsteady on his feet from insomnia, resp even and unlabored, NAD.  Patient medicated per MAR, tolerated well.  Patient continues on 1:1 observation.

## 2022-05-25 NOTE — PROGRESS NOTES
"ED Provider Progress Note    ED Observation Progress Note    Date of Service: 22    Interval History  Leodan Hyatt is a 37 y.o. male who is in observation status pending psychiatric stabilization.  The patient was evaluated by psychiatry, they made recommendations to increase his Abilify, discontinue his Seroquel.  I am told by nursing that he was agitated and awake overnight.  The patient has been constantly trying to leave his room and has been difficult to calm down.    I was asked to add additional medications to help with his sedation.  I have walked past his room and entered the room to talk to him on 4 different occasions throughout my shift.  The patient was sleeping on every occasion and at this time I do not see any indication to add more sedatives.  Psychiatry is apparently in the process of changing his medications and would like to have him observed in the ER for 1 more day.  His legal hold has .  He was denied transfer to psychiatric facility due to his history of developmental delay.  The current plan will be to ensure the patient is calm and appropriate overnight and tomorrow morning.  If this is the case then potentially he can be discharged back to his group home tomorrow.    Physical Exam  BP (!) 98/60   Pulse 87   Temp 36 °C (96.8 °F) (Temporal)   Resp 16   Ht 1.727 m (5' 8\")   Wt 57.2 kg (126 lb 1.7 oz)   SpO2 96%   BMI 19.17 kg/m² .    Constitutional: Somnolent, awakens to voice, falls back asleep.  HENT:  Grossly normal  Eyes: Grossly normal  Neck: Normal range of motion  Cardiovascular: Normal heart rate   Thorax & Lungs: No respiratory distress  Abdomen: Nondistended  Skin:  No pathologic rash.   Extremities: Well perfused  Psychiatric: Affect normal        Current Diagonsis  1. Agitation    2. Insomnia, unspecified type           "

## 2022-05-25 NOTE — ED NOTES
Patient continues to be chatty and unable to sleep, resp even and unlabored, NAD.  Patient continues on 1:1 observation

## 2022-05-25 NOTE — ED NOTES
Spoke with tele psych. Plan is to change pt's sleep medication and observe him until tomorrow for discharge. Sitter in place.

## 2022-05-25 NOTE — ED NOTES
Report to MANPREET Loomis  Patient laying comfortably on gurney, resp even and unlabored, NAD.  Patient continues on 1:1 observation.

## 2022-05-25 NOTE — ED NOTES
Patient laying on gurney comfortably, chatting with sitter, resp even and unlabored, NAD.  Patient continues on 1:1 observation.

## 2022-05-25 NOTE — ED NOTES
Assumed care of pt. Per NOC RN, pt did not sleep at all last night. Pt laying on bed with eyes open. Sitter in place for close obs.

## 2022-05-25 NOTE — ED NOTES
Patient resting comfortably on gurney, resp even and unlabored, NAD, given 3 packs of harini crackers and tolerated well.  Patient continues on 1:1 observation.

## 2022-05-25 NOTE — ED NOTES
Patient resting comfortably on gurney, resp even and unlabored, NAD.  Patient medicated per MAR for headache, tolerated well.  Patient continues on 1:1 observation.

## 2022-05-26 VITALS
HEIGHT: 68 IN | WEIGHT: 126.1 LBS | HEART RATE: 98 BPM | DIASTOLIC BLOOD PRESSURE: 62 MMHG | OXYGEN SATURATION: 99 % | RESPIRATION RATE: 16 BRPM | TEMPERATURE: 97.8 F | BODY MASS INDEX: 19.11 KG/M2 | SYSTOLIC BLOOD PRESSURE: 116 MMHG

## 2022-05-26 PROCEDURE — 700102 HCHG RX REV CODE 250 W/ 637 OVERRIDE(OP): Performed by: EMERGENCY MEDICINE

## 2022-05-26 PROCEDURE — A9270 NON-COVERED ITEM OR SERVICE: HCPCS | Performed by: EMERGENCY MEDICINE

## 2022-05-26 RX ORDER — LORAZEPAM 1 MG/1
1 TABLET ORAL ONCE
Status: COMPLETED | OUTPATIENT
Start: 2022-05-26 | End: 2022-05-26

## 2022-05-26 RX ORDER — TRAZODONE HYDROCHLORIDE 50 MG/1
50 TABLET ORAL NIGHTLY
Qty: 30 TABLET | Refills: 3 | Status: SHIPPED | OUTPATIENT
Start: 2022-05-26 | End: 2022-07-19

## 2022-05-26 RX ORDER — ARIPIPRAZOLE 10 MG/1
10 TABLET ORAL DAILY
Qty: 30 TABLET | Refills: 0 | Status: SHIPPED | OUTPATIENT
Start: 2022-05-26 | End: 2023-02-19

## 2022-05-26 RX ADMIN — ARIPIPRAZOLE 10 MG: 10 TABLET ORAL at 05:39

## 2022-05-26 RX ADMIN — LORAZEPAM 1 MG: 1 TABLET ORAL at 03:41

## 2022-05-26 NOTE — DISCHARGE PLANNING
"[facility] chart review and follow up. Collaborated with Alert TM Tammie, noted mbr is no longer on hold and possible needs to return to group home. I met with patient who verified name of [facility] \" [facility]\"-- I relayed this info to Alert Team MANPREET Cho and MAGDI Salas. Will defer thi mbr to primary discharge team.  "

## 2022-05-26 NOTE — ED NOTES
Spoke with Alert Team - patient's group home is sending transportation for safe patient discharge back to group home. Belongings bag x 1 removed from locker #1 and returned to patient to get dressed.

## 2022-05-26 NOTE — ED NOTES
"Ambulated with patient to bathroom.  Pt stated he felt dizzy jail there, and sat down on his sacrum.  This RN made sure patient did not hit head, pt did not lose consciousness. Pt was able to stand up to get into wheelchair.  Pt wheeled to bathroom.  +void, +BM.    VS upon returning to room were BP (!) 98/60   Pulse 74   Temp 36.6 °C (97.8 °F) (Temporal)   Resp 16   Ht 1.727 m (5' 8\")   Wt 57.2 kg (126 lb 1.7 oz)   SpO2 98%   BMI 19.17 kg/m²     Dr. Montoya updated.  "

## 2022-05-26 NOTE — ED NOTES
Patient ambulatory in room with steady gait. No aggression noted. Conversing appropriately with staff. 1:1 observation maintained by sitter at bedside.

## 2022-05-26 NOTE — ED NOTES
Report received from MANPREET Negrete.    Patient awake and alert, ambulatory in hallway with steady gait. Conversing appropriately with staff. No aggression noted at this time. 1:1 observation maintained by sitter at bedside.

## 2022-05-26 NOTE — DISCHARGE SUMMARY
"  ED Observation Discharge Summary    Patient:Leodan Hyatt  Patient : 1985  Patient MRN: [id number]  Patient PCP: Tom Aquino M.D.    Admit Date: 2022  Discharge Date and Time: 22 9:27 AM  Discharge Diagnosis:   1. Agitation    2. Insomnia, unspecified type       Discharge Attending: Brennen Machado M.D.  Discharge Service: ED Observation    ED Course  Leodan is a 37 y.o. male who was evaluated at [facility] for agitation.  The patient has autism, he is incapable of caring for himself independently and therefore lives at a group home.  The group home sent him here as he has had increasing agitation and apparently has been more inappropriate.  He was seen by psychiatry, his Seroquel was discontinued and Abilify was started.  During this time he had increasing insomnia, trazodone was added to the regimen.  I have evaluated the patient for the past 48 hours.  In my presence he has been calm and appropriate.  If anything he has been more somnolent than hyperactive.  He does not meet requirements for legal hold and the legal hold was allowed to  in the emergency department.  I examined the patient again this morning, he is eating breakfast, he is appropriate.  He would like to be discharged.  We do not have a medical indication to keep him in the ER and we do not have a legal hold indication either.  Kathrin from the alert team contacted the group home, they were very pleasant and accommodating and willing to take the patient back.  We will change his medications, discharge him back to the group home and he has follow-up with psychiatry in about 10 days.    Discharge Exam:  /62   Pulse 98   Temp 36.6 °C (97.8 °F) (Temporal)   Resp 16   Ht 1.727 m (5' 8\")   Wt 57.2 kg (126 lb 1.7 oz)   SpO2 99%   BMI 19.17 kg/m² .    Constitutional: Awake and alert. Nontoxic  HENT:  Grossly normal  Eyes: Grossly normal  Neck: Normal range of motion  Cardiovascular: Normal heart " rate   Thorax & Lungs: No respiratory distress  Abdomen: Nontender  Skin:  No pathologic rash.   Extremities: Well perfused  Psychiatric: Affect normal    Labs  Results for orders placed or performed during the hospital encounter of 05/20/22   URINE DRUG SCREEN   Result Value Ref Range    Amphetamines Urine Negative Negative    Barbiturates Negative Negative    Benzodiazepines Negative Negative    Cocaine Metabolite Negative Negative    Methadone Negative Negative    Opiates Negative Negative    Oxycodone Negative Negative    Phencyclidine -Pcp Negative Negative    Propoxyphene Negative Negative    Cannabinoid Metab Negative Negative   CBC WITH DIFFERENTIAL   Result Value Ref Range    WBC 11.2 (H) 4.8 - 10.8 K/uL    RBC 4.88 4.70 - 6.10 M/uL    Hemoglobin 13.1 (L) 14.0 - 18.0 g/dL    Hematocrit 39.8 (L) 42.0 - 52.0 %    MCV 81.6 81.4 - 97.8 fL    MCH 26.8 (L) 27.0 - 33.0 pg    MCHC 32.9 (L) 33.7 - 35.3 g/dL    RDW 41.7 35.9 - 50.0 fL    Platelet Count 274 164 - 446 K/uL    MPV 9.9 9.0 - 12.9 fL    Neutrophils-Polys 74.60 (H) 44.00 - 72.00 %    Lymphocytes 14.20 (L) 22.00 - 41.00 %    Monocytes 10.10 0.00 - 13.40 %    Eosinophils 0.30 0.00 - 6.90 %    Basophils 0.40 0.00 - 1.80 %    Immature Granulocytes 0.40 0.00 - 0.90 %    Nucleated RBC 0.00 /100 WBC    Neutrophils (Absolute) 8.33 (H) 1.82 - 7.42 K/uL    Lymphs (Absolute) 1.59 1.00 - 4.80 K/uL    Monos (Absolute) 1.13 (H) 0.00 - 0.85 K/uL    Eos (Absolute) 0.03 0.00 - 0.51 K/uL    Baso (Absolute) 0.05 0.00 - 0.12 K/uL    Immature Granulocytes (abs) 0.04 0.00 - 0.11 K/uL    NRBC (Absolute) 0.00 K/uL   Comp Metabolic Panel   Result Value Ref Range    Sodium 140 135 - 145 mmol/L    Potassium 3.7 3.6 - 5.5 mmol/L    Chloride 105 96 - 112 mmol/L    Co2 23 20 - 33 mmol/L    Anion Gap 12.0 7.0 - 16.0    Glucose 114 (H) 65 - 99 mg/dL    Bun 14 8 - 22 mg/dL    Creatinine 0.36 (L) 0.50 - 1.40 mg/dL    Calcium 8.4 (L) 8.5 - 10.5 mg/dL    AST(SGOT) 15 12 - 45 U/L    ALT(SGPT)  14 2 - 50 U/L    Alkaline Phosphatase 113 (H) 30 - 99 U/L    Total Bilirubin 0.2 0.1 - 1.5 mg/dL    Albumin 3.5 3.2 - 4.9 g/dL    Total Protein 6.5 6.0 - 8.2 g/dL    Globulin 3.0 1.9 - 3.5 g/dL    A-G Ratio 1.2 g/dL   URINALYSIS,CULTURE IF INDICATED    Specimen: Urine   Result Value Ref Range    Color Yellow     Character Turbid (A)     Specific Gravity 1.023 <1.035    Ph 5.0 5.0 - 8.0    Glucose Negative Negative mg/dL    Ketones Trace (A) Negative mg/dL    Protein Negative Negative mg/dL    Bilirubin Negative Negative    Urobilinogen, Urine 0.2 Negative    Nitrite Negative Negative    Leukocyte Esterase Negative Negative    Occult Blood Negative Negative    Micro Urine Req Microscopic    DIAGNOSTIC ALCOHOL   Result Value Ref Range    Diagnostic Alcohol <10.1 <10.1 mg/dL   ESTIMATED GFR   Result Value Ref Range    GFR (CKD-EPI) 148 >60 mL/min/1.73 m 2   URINE MICROSCOPIC (W/UA)   Result Value Ref Range    WBC Rare (A) /hpf    RBC Rare /hpf    Bacteria Negative None /hpf    Epithelial Cells Negative /hpf    Amorphous Crystal Present /hpf    Uric Acid Crystal Positive /hpf   POC BREATHALIZER   Result Value Ref Range    POC Breathalizer 0.00 0.00 - 0.01 Percent   EKG   Result Value Ref Range    Report       [facility] Emergency Dept.    Test Date:  2022  Pt Name:    RUTHIE LEES               Department: ER  MRN:        [id number]                      Room:       [facility]  Gender:     Male                         Technician: [id number]  :        1985                   Requested By:MALATHI STOUT  Order #:    [id number]                    Casey MD:    Measurements  Intervals                                Axis  Rate:       78                           P:          68  NV:         126                          QRS:        76  QRSD:       100                          T:          46  QT:         382  QTc:        436    Interpretive Statements  Sinus rhythm  Compared to ECG 05/15/2022  05:31:05  Sinus tachycardia no longer present  Atrial abnormality no longer present  ST (T wave) deviation no longer present  Prolonged QT interval no longer present         Radiology  DX-CHEST-PORTABLE (1 VIEW)   Final Result      No acute cardiopulmonary abnormality identified.          Disposition: Group home    Follow up: No follow-ups on file.  Psychiatry, 10 days    Medications:   New Prescriptions    No medications on file       Discharge Condition: Stable

## 2022-05-26 NOTE — ED NOTES
Pt medicated per MAR.  Held propranolol for /62.  Orders to hold if DBP <70    Unable to give Valbenazine at this time as it is a pt provided medication.  Pt states brother would be able to bring additional doses to the ER later today.

## 2022-05-26 NOTE — ED NOTES
"Pt continues to be very restless in room, pacing, asking for \"something to make me sleep\".  Notified Dr. Montoya, pt medicated per MAR  "

## 2022-05-26 NOTE — ED NOTES
Pt is redirectable, however continues to be restless in room, pacing.  Reminded patient to try and relax in bed.

## 2022-05-26 NOTE — DISCHARGE PLANNING
ALERT team  note:  37 year old male admitted 22, legal hold, inability to care for self; pt with noted h/o developmental delay; legal hold , pt is currently a voluntary pt; today, pt alert, calm, cooperative, pleasant; writer RN spoke to pt's group home, [facility], staff member, Valente, [phone], and reviewed pt's current status with plan to DC pt to return to group home today; Valente verbalized understanding and will pick pt up at [facility] ED; per Valente, pt has a legal guardian, his grandmother, Makayla Neff, [phone]; pt has a [facility] , Jeanna Wilson, [phone]; pt has an outpt psychiatrist at [facility], Dr. Ector Sky, [phone], with next appt. 22,, writer RN updated [facility] ERP Dr. Machado; pt to DC to self today to return to his group home    [employer] insurance plan

## 2022-06-19 ENCOUNTER — APPOINTMENT (OUTPATIENT)
Dept: RADIOLOGY | Facility: MEDICAL CENTER | Age: 37
End: 2022-06-19
Attending: EMERGENCY MEDICINE
Payer: MEDICARE

## 2022-06-19 ENCOUNTER — HOSPITAL ENCOUNTER (EMERGENCY)
Facility: MEDICAL CENTER | Age: 37
End: 2022-06-19
Attending: EMERGENCY MEDICINE
Payer: MEDICARE

## 2022-06-19 VITALS
TEMPERATURE: 98.5 F | WEIGHT: 131.39 LBS | BODY MASS INDEX: 19.91 KG/M2 | OXYGEN SATURATION: 95 % | HEART RATE: 86 BPM | HEIGHT: 68 IN | DIASTOLIC BLOOD PRESSURE: 75 MMHG | SYSTOLIC BLOOD PRESSURE: 128 MMHG | RESPIRATION RATE: 16 BRPM

## 2022-06-19 DIAGNOSIS — B34.9 VIRAL SYNDROME: ICD-10-CM

## 2022-06-19 DIAGNOSIS — G25.71 AKATHISIA: ICD-10-CM

## 2022-06-19 DIAGNOSIS — R05.9 COUGH: ICD-10-CM

## 2022-06-19 LAB
ALBUMIN SERPL BCP-MCNC: 3.9 G/DL (ref 3.2–4.9)
ALBUMIN/GLOB SERPL: 1.1 G/DL
ALP SERPL-CCNC: 175 U/L (ref 30–99)
ALT SERPL-CCNC: 27 U/L (ref 2–50)
ANION GAP SERPL CALC-SCNC: 10 MMOL/L (ref 7–16)
AST SERPL-CCNC: 17 U/L (ref 12–45)
BASOPHILS # BLD AUTO: 0.7 % (ref 0–1.8)
BASOPHILS # BLD: 0.07 K/UL (ref 0–0.12)
BILIRUB SERPL-MCNC: 0.3 MG/DL (ref 0.1–1.5)
BUN SERPL-MCNC: 11 MG/DL (ref 8–22)
CALCIUM SERPL-MCNC: 9.2 MG/DL (ref 8.5–10.5)
CHLORIDE SERPL-SCNC: 98 MMOL/L (ref 96–112)
CO2 SERPL-SCNC: 25 MMOL/L (ref 20–33)
CREAT SERPL-MCNC: 0.33 MG/DL (ref 0.5–1.4)
EKG IMPRESSION: NORMAL
EOSINOPHIL # BLD AUTO: 0.09 K/UL (ref 0–0.51)
EOSINOPHIL NFR BLD: 0.9 % (ref 0–6.9)
ERYTHROCYTE [DISTWIDTH] IN BLOOD BY AUTOMATED COUNT: 43.6 FL (ref 35.9–50)
FLUAV RNA SPEC QL NAA+PROBE: NEGATIVE
FLUBV RNA SPEC QL NAA+PROBE: NEGATIVE
GFR SERPLBLD CREATININE-BSD FMLA CKD-EPI: 152 ML/MIN/1.73 M 2
GLOBULIN SER CALC-MCNC: 3.5 G/DL (ref 1.9–3.5)
GLUCOSE SERPL-MCNC: 88 MG/DL (ref 65–99)
HCT VFR BLD AUTO: 42.5 % (ref 42–52)
HGB BLD-MCNC: 13.4 G/DL (ref 14–18)
IMM GRANULOCYTES # BLD AUTO: 0.06 K/UL (ref 0–0.11)
IMM GRANULOCYTES NFR BLD AUTO: 0.6 % (ref 0–0.9)
LYMPHOCYTES # BLD AUTO: 1.54 K/UL (ref 1–4.8)
LYMPHOCYTES NFR BLD: 15.6 % (ref 22–41)
MCH RBC QN AUTO: 26.7 PG (ref 27–33)
MCHC RBC AUTO-ENTMCNC: 31.5 G/DL (ref 33.7–35.3)
MCV RBC AUTO: 84.7 FL (ref 81.4–97.8)
MONOCYTES # BLD AUTO: 0.91 K/UL (ref 0–0.85)
MONOCYTES NFR BLD AUTO: 9.2 % (ref 0–13.4)
NEUTROPHILS # BLD AUTO: 7.23 K/UL (ref 1.82–7.42)
NEUTROPHILS NFR BLD: 73 % (ref 44–72)
NRBC # BLD AUTO: 0 K/UL
NRBC BLD-RTO: 0 /100 WBC
PLATELET # BLD AUTO: 289 K/UL (ref 164–446)
PMV BLD AUTO: 9.5 FL (ref 9–12.9)
POTASSIUM SERPL-SCNC: 4.2 MMOL/L (ref 3.6–5.5)
PROT SERPL-MCNC: 7.4 G/DL (ref 6–8.2)
RBC # BLD AUTO: 5.02 M/UL (ref 4.7–6.1)
SARS-COV-2 RNA RESP QL NAA+PROBE: NOTDETECTED
SODIUM SERPL-SCNC: 133 MMOL/L (ref 135–145)
SPECIMEN SOURCE: NORMAL
TROPONIN T SERPL-MCNC: 7 NG/L (ref 6–19)
WBC # BLD AUTO: 9.9 K/UL (ref 4.8–10.8)

## 2022-06-19 PROCEDURE — 93005 ELECTROCARDIOGRAM TRACING: CPT | Performed by: EMERGENCY MEDICINE

## 2022-06-19 PROCEDURE — 80053 COMPREHEN METABOLIC PANEL: CPT

## 2022-06-19 PROCEDURE — 96374 THER/PROPH/DIAG INJ IV PUSH: CPT

## 2022-06-19 PROCEDURE — C9803 HOPD COVID-19 SPEC COLLECT: HCPCS | Performed by: EMERGENCY MEDICINE

## 2022-06-19 PROCEDURE — 84484 ASSAY OF TROPONIN QUANT: CPT

## 2022-06-19 PROCEDURE — 36415 COLL VENOUS BLD VENIPUNCTURE: CPT

## 2022-06-19 PROCEDURE — 71045 X-RAY EXAM CHEST 1 VIEW: CPT

## 2022-06-19 PROCEDURE — A9270 NON-COVERED ITEM OR SERVICE: HCPCS | Performed by: EMERGENCY MEDICINE

## 2022-06-19 PROCEDURE — 85025 COMPLETE CBC W/AUTO DIFF WBC: CPT

## 2022-06-19 PROCEDURE — 700102 HCHG RX REV CODE 250 W/ 637 OVERRIDE(OP): Performed by: EMERGENCY MEDICINE

## 2022-06-19 PROCEDURE — 0240U HCHG SARS-COV-2 COVID-19 NFCT DS RESP RNA 3 TRGT MIC: CPT

## 2022-06-19 PROCEDURE — 700111 HCHG RX REV CODE 636 W/ 250 OVERRIDE (IP): Performed by: EMERGENCY MEDICINE

## 2022-06-19 PROCEDURE — 99285 EMERGENCY DEPT VISIT HI MDM: CPT

## 2022-06-19 RX ORDER — ALBUTEROL SULFATE 90 UG/1
4 AEROSOL, METERED RESPIRATORY (INHALATION) ONCE
Status: COMPLETED | OUTPATIENT
Start: 2022-06-19 | End: 2022-06-19

## 2022-06-19 RX ORDER — DIPHENHYDRAMINE HYDROCHLORIDE 50 MG/ML
50 INJECTION INTRAMUSCULAR; INTRAVENOUS ONCE
Status: COMPLETED | OUTPATIENT
Start: 2022-06-19 | End: 2022-06-19

## 2022-06-19 RX ORDER — DIPHENHYDRAMINE HCL 25 MG
25 CAPSULE ORAL EVERY 6 HOURS PRN
Qty: 28 CAPSULE | Refills: 0 | Status: SHIPPED | OUTPATIENT
Start: 2022-06-19 | End: 2022-06-26

## 2022-06-19 RX ORDER — ALBUTEROL SULFATE 90 UG/1
2 AEROSOL, METERED RESPIRATORY (INHALATION) EVERY 6 HOURS PRN
Qty: 8.5 G | Refills: 0 | Status: SHIPPED | OUTPATIENT
Start: 2022-06-19 | End: 2022-07-19

## 2022-06-19 RX ADMIN — DIPHENHYDRAMINE HYDROCHLORIDE 50 MG: 50 INJECTION INTRAMUSCULAR; INTRAVENOUS at 04:57

## 2022-06-19 RX ADMIN — ALBUTEROL SULFATE 4 PUFF: 90 AEROSOL, METERED RESPIRATORY (INHALATION) at 04:58

## 2022-06-19 ASSESSMENT — LIFESTYLE VARIABLES: DO YOU DRINK ALCOHOL: NO

## 2022-06-19 ASSESSMENT — FIBROSIS 4 INDEX: FIB4 SCORE: 0.54

## 2022-06-19 NOTE — ED TRIAGE NOTES
"Chief Complaint   Patient presents with   • Shortness of Breath     States his tardive dyskinesia is causing him SOB.  Pt having many involuntary muscle spasms in triage.  Able to answer questions, and hold a conversation without difficulty.       Pt BIB EMS to triage. Pt A&Ox4, came in for above complaint. States hx of autism.    Pt to lobby . Pt educated on alerting staff in changes to condition. Pt verbalized understanding.     /89   Pulse 87   Temp 36.9 °C (98.5 °F) (Temporal)   Resp 16   Ht 1.727 m (5' 8\")   Wt 59.6 kg (131 lb 6.3 oz)   SpO2 96%   BMI 19.98 kg/m²     "

## 2022-06-19 NOTE — ED NOTES
Pt discharged and understanding of discharge information and medications at this time. Pt alert and oriented, NAD noted, VSS, PIV removed. Pt ambulated to lobby with all belongings.   Cab called for pt, pt walked to lobby and waiting for cab.

## 2022-06-19 NOTE — ED NOTES
Pt called out asking when the doctor will be in his room. Pt re-informed that the doctor will be in there shortly.

## 2022-06-19 NOTE — ED PROVIDER NOTES
ED Provider Note    CHIEF COMPLAINT  Chief Complaint   Patient presents with   • Shortness of Breath     States his tardive dyskinesia is causing him SOB.  Pt having many involuntary muscle spasms in triage.  Able to answer questions, and hold a conversation without difficulty.       HPI  Patient is a 37-year-old male with a past medical history of ADHD, psychiatric illness and tardive dyskinesia presents emergency room for concerns regarding his tardive dyskinesia.  Patient states that he has been having these involuntary shaking that he associates with his medications and this is happened previously.  This is causing him to feel profoundly short of breath.  He says he has had some body aches and chills, feels like he had subjective fevers but has not measured anything.  Used to be a former smoker and says he feels like he had a hard time breathing earlier however this is somewhat improved at this time.  He has had a lot of secretions in the back of his throat, has had some intermittent cough and had one element of gagging following his coughing episodes.  He is not having any chest pains, no abdominal pain, no diarrheal illness.  He is vaccinated against COVID.    Chart is reviewed from previous encounter on 5/20/2022 and the patient was seen for evaluation of behavioral disturbances and over the course of days has been refusing to take some of his medications.  He has had side effects including tardive dyskinesia.    PPE Note: I personally donned full PPE for all patient encounters during this visit, including being clean-shaven with an N95 respirator mask, gloves, and goggles.       REVIEW OF SYSTEMS  See HPI for further details. All other systems are negative.     PAST MEDICAL HISTORY   has a past medical history of ADHD (attention deficit hyperactivity disorder), Psychiatric problem, and Tardive dyskinesia.    SOCIAL HISTORY  Social History     Tobacco Use   • Smoking status: Former Smoker     Packs/day: 1.00  "    Types: Cigarettes     Start date: 2/27/2006   • Smokeless tobacco: Never Used   Vaping Use   • Vaping Use: Former   Substance and Sexual Activity   • Alcohol use: Not Currently     Comment: \"maybe 3 times a year\"   • Drug use: Yes     Types: Inhaled, Oral     Comment: marijuana occasionally   • Sexual activity: Not on file       SURGICAL HISTORY   has a past surgical history that includes sintia rectal abscess incision and drainage (N/A, 2/26/2016).    CURRENT MEDICATIONS  Home Medications     Reviewed by Penelope Mata R.N. (Registered Nurse) on 06/19/22 at 0127  Med List Status: Partial   Medication Last Dose Status   ARIPiprazole (ABILIFY) 10 MG Tab  Active   azithromycin (ZITHROMAX) 250 MG Tab  Active   clonazepam (KLONOPIN) 2 MG tablet  Active   nicotine (NICODERM) 7 MG/24HR PATCH 24 HR  Active   nicotine (NICOTROL) 10 MG inhaler  Active   propranolol (INDERAL) 10 MG Tab  Active   traZODone (DESYREL) 50 MG Tab  Active   Valbenazine Tosylate (INGREZZA) 80 MG Cap  Active                ALLERGIES  Allergies   Allergen Reactions   • Penicillins        PHYSICAL EXAM  VITAL SIGNS: /75   Pulse 86   Temp 36.9 °C (98.5 °F) (Temporal)   Resp 16   Ht 1.727 m (5' 8\")   Wt 59.6 kg (131 lb 6.3 oz)   SpO2 95%   BMI 19.98 kg/m²   Pulse ox interpretation: I interpret this pulse ox as normal.  Genl: M sitting in gurney comfortably, speaking clearly, appears in mild distress, slight writhing movements in extremities.  No acute respiratory distress  Head: NC/AT   ENT: Mucous membranes moist, posterior pharynx clear, uvula midline, nares patent bilaterally  Eyes: Normal sclera, pupils equal round reactive to light  Neck: Supple, FROM, no LAD appreciated  Pulmonary: Lungs with slight wheeze in left upper lung field  CV:  RRR, no murmur appreciated, pulses 2+ in both upper and lower extremities,  Abdomen: soft, NT/ND; no rebound/guarding, no masses palpated, no HSM  Musculoskeletal: Pain free ROM of the neck. Moving " upper and lower extremities and spontaneous in coordinated fashion  Neuro: A&Ox4 (person, place, time, situation), speech fluent, gait steady, no focal deficits appreciated  Skin: No rash or lesions.  No pallor or jaundice.  No cyanosis.  Warm and dry.     DIAGNOSTIC STUDIES / PROCEDURES    LABS  Labs Reviewed   CBC WITH DIFFERENTIAL - Abnormal; Notable for the following components:       Result Value    Hemoglobin 13.4 (*)     MCH 26.7 (*)     MCHC 31.5 (*)     Neutrophils-Polys 73.00 (*)     Lymphocytes 15.60 (*)     Monos (Absolute) 0.91 (*)     All other components within normal limits   COMP METABOLIC PANEL - Abnormal; Notable for the following components:    Sodium 133 (*)     Creatinine 0.33 (*)     Alkaline Phosphatase 175 (*)     All other components within normal limits   TROPONIN   COV-2 AND FLU A/B BY PCR ([facility])   ESTIMATED GFR     RADIOLOGY  DX-CHEST-PORTABLE (1 VIEW)   Final Result         1. No acute cardiopulmonary abnormalities are identified.          COURSE & MEDICAL DECISION MAKING  Pertinent Labs & Imaging studies reviewed. (See chart for details)    DDX: Viral syndrome, pneumonia, pneumothorax, hemothorax, medication side effect, complications of underlying psychiatric illness, atypical ACS, arrhythmia.    [facility]    Initial evaluation at 0326:  Patient presents emergency room for symptoms as described above.  The patient has a history of underlying psychiatric illness, has been taking medications for some time and this includes a medication that is a monoamine oxidase inhibitor for control of his tardive dyskinesia.  He states he occasionally gets these symptoms and noticed a small amount of discomfort that he says is minimal in severity but he was requesting some Benadryl.  Additionally he started having some vague nonspecific body aches, feelings of unwellness and development of cough.  He says he had a sensation of shortness of breath though he is not feeling short of breath at this time  and does not have tachypnea or dyspnea.  He has no history of prior PE or ACS.  Do believe this is low likelihood but with his nonspecific cough and perceived shortness of breath, I will obtain the work-up for the broad differential as noted above.      Chest x-ray is without any focal consolidations, rib abnormalities, masses or other concerning features.  Lab work shows no evidence of leukocytosis, no anemia or gross electrolyte derangements.  COVID test will be obtained as he does have symptoms most suggestive of viral etiology and with a cough this could be inflammatory changes caused by viral syndrome.  Treated with small nebulizer for viral associated bronchitis does not currently require antibiosis.  Troponin is not elevated without ischemic changes on EKG or ectopy on the monitor I do not believe that this is a cardiac source.  Patient is updated regarding these findings, he will follow-up with the COVID and influenza testing via [facility].  Currently he is stable for outpatient follow-up and is given very strict return precautions.  Discharged home in stable condition.    FINAL IMPRESSION  Visit Diagnoses     ICD-10-CM   1. Akathisia  G25.71   2. Viral syndrome  B34.9   3. Cough  R05.9     Electronically signed by: Ari Blancas M.D., 6/19/2022 3:26 AM

## 2022-06-19 NOTE — ED NOTES
Pt states he needs to use the restroom, pt stood at bedside with rn assist to use urinal, no uop at this time.

## 2022-06-19 NOTE — ED NOTES
Pt going to [facility], called Nancy at [phone] to notify of return. Other numbers provided had no answer.

## 2022-06-28 ENCOUNTER — HOSPITAL ENCOUNTER (EMERGENCY)
Facility: MEDICAL CENTER | Age: 37
End: 2022-07-01
Attending: EMERGENCY MEDICINE
Payer: MEDICARE

## 2022-06-28 DIAGNOSIS — Z91.148 NON COMPLIANCE W MEDICATION REGIMEN: ICD-10-CM

## 2022-06-28 DIAGNOSIS — G24.01 TARDIVE DYSKINESIA: ICD-10-CM

## 2022-06-28 LAB
AMPHET UR QL SCN: NEGATIVE
BARBITURATES UR QL SCN: NEGATIVE
BENZODIAZ UR QL SCN: NEGATIVE
BZE UR QL SCN: NEGATIVE
CANNABINOIDS UR QL SCN: NEGATIVE
METHADONE UR QL SCN: NEGATIVE
OPIATES UR QL SCN: NEGATIVE
OXYCODONE UR QL SCN: NEGATIVE
PCP UR QL SCN: NEGATIVE
POC BREATHALIZER: 0 PERCENT (ref 0–0.01)
PROPOXYPH UR QL SCN: NEGATIVE

## 2022-06-28 PROCEDURE — 302970 POC BREATHALIZER: Performed by: EMERGENCY MEDICINE

## 2022-06-28 PROCEDURE — 80307 DRUG TEST PRSMV CHEM ANLYZR: CPT

## 2022-06-28 PROCEDURE — A9270 NON-COVERED ITEM OR SERVICE: HCPCS | Performed by: EMERGENCY MEDICINE

## 2022-06-28 PROCEDURE — 99285 EMERGENCY DEPT VISIT HI MDM: CPT

## 2022-06-28 PROCEDURE — 700102 HCHG RX REV CODE 250 W/ 637 OVERRIDE(OP): Performed by: EMERGENCY MEDICINE

## 2022-06-28 RX ORDER — DIPHENHYDRAMINE HCL 25 MG
25 TABLET ORAL ONCE
Status: COMPLETED | OUTPATIENT
Start: 2022-06-28 | End: 2022-06-28

## 2022-06-28 RX ORDER — LORAZEPAM 1 MG/1
1 TABLET ORAL ONCE
Status: COMPLETED | OUTPATIENT
Start: 2022-06-28 | End: 2022-06-28

## 2022-06-28 RX ADMIN — DIPHENHYDRAMINE HYDROCHLORIDE 25 MG: 25 TABLET ORAL at 21:41

## 2022-06-28 RX ADMIN — LORAZEPAM 1 MG: 1 TABLET ORAL at 20:35

## 2022-06-28 ASSESSMENT — FIBROSIS 4 INDEX: FIB4 SCORE: 0.42

## 2022-06-29 PROCEDURE — 90791 PSYCH DIAGNOSTIC EVALUATION: CPT

## 2022-06-29 PROCEDURE — 700102 HCHG RX REV CODE 250 W/ 637 OVERRIDE(OP): Performed by: PSYCHIATRY & NEUROLOGY

## 2022-06-29 PROCEDURE — A9270 NON-COVERED ITEM OR SERVICE: HCPCS | Performed by: EMERGENCY MEDICINE

## 2022-06-29 PROCEDURE — 700102 HCHG RX REV CODE 250 W/ 637 OVERRIDE(OP): Performed by: EMERGENCY MEDICINE

## 2022-06-29 PROCEDURE — 99284 EMERGENCY DEPT VISIT MOD MDM: CPT | Performed by: PSYCHIATRY & NEUROLOGY

## 2022-06-29 PROCEDURE — A9270 NON-COVERED ITEM OR SERVICE: HCPCS | Performed by: PSYCHIATRY & NEUROLOGY

## 2022-06-29 RX ORDER — CLONAZEPAM 0.5 MG/1
2 TABLET ORAL 2 TIMES DAILY PRN
Status: DISCONTINUED | OUTPATIENT
Start: 2022-06-29 | End: 2022-07-01 | Stop reason: HOSPADM

## 2022-06-29 RX ORDER — DICYCLOMINE HCL 20 MG
20 TABLET ORAL ONCE
Status: COMPLETED | OUTPATIENT
Start: 2022-06-29 | End: 2022-06-29

## 2022-06-29 RX ORDER — ALBUTEROL SULFATE 90 UG/1
2 AEROSOL, METERED RESPIRATORY (INHALATION) EVERY 6 HOURS PRN
Status: DISCONTINUED | OUTPATIENT
Start: 2022-06-29 | End: 2022-07-01 | Stop reason: HOSPADM

## 2022-06-29 RX ORDER — IBUPROFEN 600 MG/1
600 TABLET ORAL ONCE
Status: COMPLETED | OUTPATIENT
Start: 2022-06-29 | End: 2022-06-29

## 2022-06-29 RX ORDER — ACETAMINOPHEN 325 MG/1
650 TABLET ORAL ONCE
Status: COMPLETED | OUTPATIENT
Start: 2022-06-29 | End: 2022-06-29

## 2022-06-29 RX ORDER — QUETIAPINE FUMARATE 25 MG/1
25 TABLET, FILM COATED ORAL NIGHTLY
Status: DISCONTINUED | OUTPATIENT
Start: 2022-06-29 | End: 2022-06-30

## 2022-06-29 RX ORDER — PROPRANOLOL HYDROCHLORIDE 10 MG/1
10 TABLET ORAL 2 TIMES DAILY
Status: DISCONTINUED | OUTPATIENT
Start: 2022-06-29 | End: 2022-07-01 | Stop reason: HOSPADM

## 2022-06-29 RX ORDER — TRAZODONE HYDROCHLORIDE 50 MG/1
50 TABLET ORAL NIGHTLY
Status: DISCONTINUED | OUTPATIENT
Start: 2022-06-29 | End: 2022-07-01 | Stop reason: HOSPADM

## 2022-06-29 RX ADMIN — VALBENAZINE 80 MG: 80 CAPSULE ORAL at 17:03

## 2022-06-29 RX ADMIN — ACETAMINOPHEN 650 MG: 325 TABLET, FILM COATED ORAL at 03:40

## 2022-06-29 RX ADMIN — PROPRANOLOL HYDROCHLORIDE 10 MG: 10 TABLET ORAL at 05:54

## 2022-06-29 RX ADMIN — TRAZODONE HYDROCHLORIDE 50 MG: 50 TABLET ORAL at 04:18

## 2022-06-29 RX ADMIN — IBUPROFEN 600 MG: 600 TABLET, FILM COATED ORAL at 14:14

## 2022-06-29 RX ADMIN — DICYCLOMINE HYDROCHLORIDE 20 MG: 20 TABLET ORAL at 17:29

## 2022-06-29 RX ADMIN — TRAZODONE HYDROCHLORIDE 50 MG: 50 TABLET ORAL at 21:19

## 2022-06-29 RX ADMIN — QUETIAPINE FUMARATE 25 MG: 25 TABLET ORAL at 21:19

## 2022-06-29 ASSESSMENT — ENCOUNTER SYMPTOMS
CONSTIPATION: 0
BACK PAIN: 1
CONSTITUTIONAL NEGATIVE: 1
INSOMNIA: 1
ABDOMINAL PAIN: 0
NAUSEA: 0
RESPIRATORY NEGATIVE: 1
HEADACHES: 1
DEPRESSION: 1
VOMITING: 1
DIARRHEA: 0
CARDIOVASCULAR NEGATIVE: 1

## 2022-06-29 NOTE — PROGRESS NOTES
Patient's home medications have been reviewed by the pharmacy team.     Past Medical History:   Diagnosis Date   • ADHD (attention deficit hyperactivity disorder)    • Psychiatric problem     Mild Mental Retardation   • Tardive dyskinesia        Patient's Medications   New Prescriptions    No medications on file   Previous Medications    ALBUTEROL 108 (90 BASE) MCG/ACT AERO SOLN INHALATION AEROSOL    Inhale 2 Puffs every 6 hours as needed for Shortness of Breath.    ARIPIPRAZOLE (ABILIFY) 10 MG TAB    Take 1 Tablet by mouth every day.    CLONAZEPAM (KLONOPIN) 2 MG TABLET    Take 2 mg by mouth 2 times a day as needed (Shaking).    NICOTINE (NICODERM) 7 MG/24HR PATCH 24 HR    Place 1 Patch on the skin every 24 hours.    NICOTINE (NICOTROL) 10 MG INHALER    Inhale 1 Puff as needed for Smoking Cessation. Use 1-4 cartiages per day to taper off smoking    PROPRANOLOL (INDERAL) 10 MG TAB    Take 10 mg by mouth 2 times a day.    TRAZODONE (DESYREL) 50 MG TAB    Take 1 Tablet by mouth every evening.    VALBENAZINE TOSYLATE (INGREZZA) 80 MG CAP    Take 80 mg by mouth every day. Indications: Tardive Dyskinesia   Modified Medications    No medications on file   Discontinued Medications    AZITHROMYCIN (ZITHROMAX) 250 MG TAB    Take two tabs by mouth on day one, then one tab by mouth daily on days 2-5.          A:    The following pharmacotherapy concerns may be contributing to current complaints:  -Tardive dyskinesia secondary to antipsychotic use.requiring benadryl and ativan.     P:    1. Hold abilify until psych evaluates in AM.     Steven Lynn, AntD

## 2022-06-29 NOTE — ED NOTES
Notified ERP patient is escalating, coming out of room every few minutes and walking towards staff, patient keeps going to the restroom and asking for RN to stat with him in his room. New orders placed, pt medicated per MAR.Group home called, transferred to alert team

## 2022-06-29 NOTE — DISCHARGE PLANNING
Alert Team Note:    Contacted by [facility], spoke to daniella. Pt has been declined. Dr. He believes that the program would not be effective for this pt due to his low functionality.     Sent referral to [facility] via [facility].

## 2022-06-29 NOTE — ED NOTES
This RN assisted patient with cleaning himself using washcloths and soap. Fresh gown, underpants, and bedding provided. Security contacted to arrange shower for patient for tomorrow am.

## 2022-06-29 NOTE — ED NOTES
Received report from Jeanne CASE. Assumed pt care. Moved pt to green 29 so pt could watch tv. Pt denies any needs at this time
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
## 2022-06-29 NOTE — ED NOTES
Patient ambulatory to the bathroom with a steady gait with staff and back to room. 1:1 sitter in direct view of patient. Pt provided heat packs

## 2022-06-29 NOTE — ED NOTES
Notified ERP patient is anxious keeps pacing to the bathroom back to room every couple of minutes. Patient also asking for something for sleep. New orders placed.

## 2022-06-29 NOTE — ED PROVIDER NOTES
"ED Provider Note    CHIEF COMPLAINT  Chief Complaint   Patient presents with   • Suicidal Ideation   • Homicidal Ideation       HPI  Leodan Hyatt is a 37 y.o. male who presents to the emergency department with primary inability to care for self. Patient living a group home. Seemingly noncompliant with medications. Chronic cardiac dyskinesia. Patient currently denying any suicidal homicidal ideations. No other current medical complaint.    REVIEW OF SYSTEMS  See HPI for further details. All other systems are negative.     PAST MEDICAL HISTORY   has a past medical history of ADHD (attention deficit hyperactivity disorder), Psychiatric problem, and Tardive dyskinesia.    SOCIAL HISTORY  Social History     Tobacco Use   • Smoking status: Former Smoker     Packs/day: 1.00     Types: Cigarettes     Start date: 2/27/2006   • Smokeless tobacco: Never Used   Vaping Use   • Vaping Use: Former   Substance and Sexual Activity   • Alcohol use: Not Currently     Comment: \"maybe 3 times a year\"   • Drug use: Yes     Types: Inhaled, Oral     Comment: marijuana occasionally   • Sexual activity: Not on file       SURGICAL HISTORY   has a past surgical history that includes sintia rectal abscess incision and drainage (N/A, 2/26/2016).    CURRENT MEDICATIONS  Home Medications     Reviewed by Fabian Gilbert (Pharmacy Tech) on 06/28/22 at 2149  Med List Status: Complete   Medication Last Dose Status   albuterol 108 (90 Base) MCG/ACT Aero Soln inhalation aerosol UNK Active   ARIPiprazole (ABILIFY) 10 MG Tab UNK Active   clonazepam (KLONOPIN) 2 MG tablet UNK Active   nicotine (NICODERM) 7 MG/24HR PATCH 24 HR UNK Active   nicotine (NICOTROL) 10 MG inhaler UNK Active   propranolol (INDERAL) 10 MG Tab UNK Active   traZODone (DESYREL) 50 MG Tab UNK Active   Valbenazine Tosylate (INGREZZA) 80 MG Cap UNK Active                ALLERGIES  Allergies   Allergen Reactions   • Penicillins        PHYSICAL EXAM  VITAL SIGNS: /78   " "Pulse 95   Temp 36.3 °C (97.3 °F) (Temporal)   Resp 16   Ht 1.727 m (5' 8\")   Wt 59.4 kg (131 lb)   SpO2 95%   BMI 19.92 kg/m²  @MIRTA[[id number]::@  Pulse ox interpretation:I interpret this pulse ox as normal.  Constitutional: Alert in no apparent distress.  HENT: Normocephalic, Atraumatic, Bilateral external ears normal. Nose normal.   Eyes: Pupils are equal and reactive.   Heart: Regular rate and rythm, no murmurs.    Lungs: Clear to auscultation bilaterally.  Skin: Warm, Dry, No erythema, No rash.   Neurologic: Alert, awake. Five dyskinesia movements.            COURSE & MEDICAL DECISION MAKING  Pertinent Labs & Imaging studies reviewed. (See chart for details)  ED observation:   Start: 2030  FH: no obtainable given patients clinical condition    37-year-old male presented emergency room with the above presentation. Patient will be kept in the ER for ongoing ER observational psychiatric care. His medications have been reviewed from his most recent discharge and is will be reinstated at this time. He has been provided with Ativan and Benadryl to assist him from his current agitated state and by his request to help with sleep aids. Patient can be evaluated by the psychiatry team in the morning. He has been evaluated by her behavioral services this evening.        FINAL IMPRESSION  1. Non compliance w medication regimen    2. Tardive dyskinesia               Electronically signed by: Angelo Reyes M.D., 6/29/2022 1:18 AM        "

## 2022-06-29 NOTE — ED NOTES
Patient keeps pacing and trying to ask for new things, yelling out, pt easily redirectable.    There are no preventive care reminders to display for this patient.    Patient is up to date, no discussion needed.

## 2022-06-29 NOTE — DISCHARGE PLANNING
Alert Team Note:    Contacted [facility], spoke to Nicola. Pt packet has been received and is being reviewed. Pt is on the wait list.     Contacted by Erich Farias, spoke to Sumaya. Pt  has been received and is being reviewed. No beds available at this time.

## 2022-06-29 NOTE — DISCHARGE PLANNING
Alert team:    Spoke with Kimberly, , marlon she will drop off POM Ingrezza 80 mg to ER this morning.

## 2022-06-29 NOTE — PROGRESS NOTES
"ED Provider Progress Note    ED Observation Progress Note    Date of Service: 06/29/22    Interval History  Briefly this is a 37-year-old male who presented on 6/28/2022 with inability to care for self.  Patient lives in a group home secondary to underlying debility, cognitive delay, tardive dyskinesia secondary to medications in the past.  He has been seen in the emergency department multiple times in the last month.  He has reportedly been noncompliant with his medication regimen and is pending further psychiatric care.  Psychiatry team has evaluated patient's home medications and resumed them except for Abilify, will hold until psychiatry team evaluates the patient.    Patient reports no acute complaints this morning except feeling \"a little shaky.\"  Otherwise doing well.        Physical Exam  /60   Pulse 87   Temp 36.4 °C (97.6 °F) (Temporal)   Resp 18   Ht 1.727 m (5' 8\")   Wt 59.4 kg (131 lb)   SpO2 97%   BMI 19.92 kg/m² .    Constitutional: Awake and alert. Nontoxic  HENT:  Grossly normal  Eyes: Grossly normal  Neck: Normal range of motion  Cardiovascular: Normal heart rate   Thorax & Lungs: No respiratory distress  Abdomen: Nontender  Skin:  No pathologic rash.   Extremities: Well perfused  Psychiatric: Affect normal    Labs  Results for orders placed or performed during the hospital encounter of 06/28/22   Urine Drug Screen   Result Value Ref Range    Amphetamines Urine Negative Negative    Barbiturates Negative Negative    Benzodiazepines Negative Negative    Cocaine Metabolite Negative Negative    Methadone Negative Negative    Opiates Negative Negative    Oxycodone Negative Negative    Phencyclidine -Pcp Negative Negative    Propoxyphene Negative Negative    Cannabinoid Metab Negative Negative   POC BREATHALIZER   Result Value Ref Range    POC Breathalizer 0.00 0.00 - 0.01 Percent       Radiology  No orders to display       Problem List  1.  Tardive dyskinesia and inability to care for self " due to underlying psychiatric disorders-pending recommendations by psychiatric team, behavioral health team has evaluated the patient, home medications except for Abilify have been resumed      Electronically signed by: Shaniqua Blair M.D., 6/29/2022 7:38 AM
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
## 2022-06-29 NOTE — DISCHARGE PLANNING
HTH/SCP chart review completed.Mbr came from a Group Home called [facility]- [phone].   Attempted to see mbr at bedside, MD talking with patient. TCN called [phone] to set up PCP appointment. TCN nnoted referrals were previously sent to [facility] and [facility] by Alert Team.Medical records show mbr currently is on LEGAL HOLD-- TCN will not follow and will defer to Hospital Alert Team for DC planning.

## 2022-06-29 NOTE — ED NOTES
Pt medicated for low back pain, pt talking with RN about how he would like a shower in the AM. 1:1 sitter in direct view of patient.

## 2022-06-29 NOTE — ED NOTES
Pt ambulatory to the bathroom with a steady gait and back to room with staff. 1:1 sitter in direct view of patient.

## 2022-06-29 NOTE — ED NOTES
Patient spoke with grandmother on this RN's phone. Patient ambulatory to bathroom. Patient reports back pain has decreased to 1/10. Sitter in view of patient.

## 2022-06-29 NOTE — ED NOTES
Report given to Kelsea CASE from [facility], reports may have open beds in the opening and pt will be pending at this time.
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
## 2022-06-29 NOTE — ED NOTES
All personal items removed. Pt in hospital clothing only. Removed all sharps and potentially dangerous items from room. No visitors. Curtains open at all times. Discussed suicide precautions with patient and they are able to verbalize understanding. 1:1 sitter in direct view of patient. UA collected and sent.
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
## 2022-06-30 PROCEDURE — 700102 HCHG RX REV CODE 250 W/ 637 OVERRIDE(OP): Performed by: EMERGENCY MEDICINE

## 2022-06-30 PROCEDURE — 96372 THER/PROPH/DIAG INJ SC/IM: CPT

## 2022-06-30 PROCEDURE — A9270 NON-COVERED ITEM OR SERVICE: HCPCS | Performed by: EMERGENCY MEDICINE

## 2022-06-30 PROCEDURE — 700111 HCHG RX REV CODE 636 W/ 250 OVERRIDE (IP): Performed by: PSYCHIATRY & NEUROLOGY

## 2022-06-30 PROCEDURE — 99284 EMERGENCY DEPT VISIT MOD MDM: CPT | Performed by: PSYCHIATRY & NEUROLOGY

## 2022-06-30 PROCEDURE — 700102 HCHG RX REV CODE 250 W/ 637 OVERRIDE(OP): Performed by: PSYCHIATRY & NEUROLOGY

## 2022-06-30 RX ORDER — IBUPROFEN 600 MG/1
600 TABLET ORAL EVERY 6 HOURS PRN
Status: DISCONTINUED | OUTPATIENT
Start: 2022-06-30 | End: 2022-07-01 | Stop reason: HOSPADM

## 2022-06-30 RX ORDER — ARIPIPRAZOLE 10 MG/1
10 TABLET ORAL DAILY
Status: DISCONTINUED | OUTPATIENT
Start: 2022-06-30 | End: 2022-06-30

## 2022-06-30 RX ORDER — ACETAMINOPHEN 325 MG/1
650 TABLET ORAL EVERY 6 HOURS PRN
Status: DISCONTINUED | OUTPATIENT
Start: 2022-06-30 | End: 2022-07-01 | Stop reason: HOSPADM

## 2022-06-30 RX ORDER — QUETIAPINE FUMARATE 25 MG/1
25 TABLET, FILM COATED ORAL ONCE
Status: COMPLETED | OUTPATIENT
Start: 2022-06-30 | End: 2022-06-30

## 2022-06-30 RX ORDER — DIPHENHYDRAMINE HYDROCHLORIDE 50 MG/ML
50 INJECTION INTRAMUSCULAR; INTRAVENOUS ONCE
Status: COMPLETED | OUTPATIENT
Start: 2022-06-30 | End: 2022-06-30

## 2022-06-30 RX ORDER — QUETIAPINE FUMARATE 25 MG/1
25 TABLET, FILM COATED ORAL 2 TIMES DAILY
Status: DISCONTINUED | OUTPATIENT
Start: 2022-06-30 | End: 2022-06-30

## 2022-06-30 RX ORDER — CLONIDINE HYDROCHLORIDE 0.1 MG/1
0.1 TABLET ORAL 3 TIMES DAILY
Status: DISCONTINUED | OUTPATIENT
Start: 2022-06-30 | End: 2022-07-01 | Stop reason: HOSPADM

## 2022-06-30 RX ORDER — IBUPROFEN 600 MG/1
600 TABLET ORAL EVERY 6 HOURS PRN
Status: DISCONTINUED | OUTPATIENT
Start: 2022-06-30 | End: 2022-06-30

## 2022-06-30 RX ADMIN — CLONAZEPAM 2 MG: 0.5 TABLET ORAL at 19:51

## 2022-06-30 RX ADMIN — VALBENAZINE 80 MG: 80 CAPSULE ORAL at 06:00

## 2022-06-30 RX ADMIN — QUETIAPINE FUMARATE 25 MG: 25 TABLET ORAL at 10:47

## 2022-06-30 RX ADMIN — DIPHENHYDRAMINE HYDROCHLORIDE 50 MG: 50 INJECTION INTRAMUSCULAR; INTRAVENOUS at 13:21

## 2022-06-30 RX ADMIN — PROPRANOLOL HYDROCHLORIDE 10 MG: 10 TABLET ORAL at 17:31

## 2022-06-30 RX ADMIN — CLONIDINE HYDROCHLORIDE 0.1 MG: 0.1 TABLET ORAL at 17:31

## 2022-06-30 RX ADMIN — IBUPROFEN 600 MG: 600 TABLET, FILM COATED ORAL at 15:52

## 2022-06-30 RX ADMIN — TRAZODONE HYDROCHLORIDE 50 MG: 50 TABLET ORAL at 20:30

## 2022-06-30 RX ADMIN — IBUPROFEN 600 MG: 600 TABLET, FILM COATED ORAL at 04:01

## 2022-06-30 ASSESSMENT — ENCOUNTER SYMPTOMS
GASTROINTESTINAL NEGATIVE: 1
RESPIRATORY NEGATIVE: 1
CONSTITUTIONAL NEGATIVE: 1
PSYCHIATRIC NEGATIVE: 1
CARDIOVASCULAR NEGATIVE: 1

## 2022-06-30 NOTE — ED NOTES
Pt frequently up and wandering about room, impulsive but redirectable. Eeasy, equal chest rise and fall. Pt remains cooperative under direct obs of designee.

## 2022-06-30 NOTE — ED NOTES
Pt resting in room, easy, equal chest rise and fall. Pt remains calm and cooperative under direct obs of designee. Pt awaiting shower.

## 2022-06-30 NOTE — ED NOTES
Pt walked about pod with RN and sitter. Pt thankful for chance to get out of room for a few minutes.

## 2022-06-30 NOTE — ED NOTES
Pt sleeping, skin warm and dry, airway patent, equal chest rise and fall, rr even and unlabored, nad noted. 1:1 sitter monitoring pt.

## 2022-06-30 NOTE — ED NOTES
"Group home [facility] called and advised that pt is supposed to be on abilify 20mg QAM and clonidine .1mg TID per pt's psychiatrist, but pt has not been taking. Group home requesting pt be started on these medications. MD Samaniego made aware and orders placed.   "

## 2022-06-30 NOTE — DISCHARGE PLANNING
Alert Team Note:     Contacted [facility], spoke to Tasia. Pt is on the wait list. No beds available at this time.

## 2022-06-30 NOTE — PROGRESS NOTES
"ED Provider Progress Note    ED Observation Progress Note    Date of Service: 06/30/22    Interval History  This is a 37-year-old male with cognitive delay and inability to care for self who is pending safe discharge planning.  Psychiatry team evaluated patient yesterday and recommends continuing home medication.  This morning patient has no acute complaints.    Physical Exam  /66   Pulse 95   Temp 36.4 °C (97.6 °F) (Temporal)   Resp 16   Ht 1.727 m (5' 8\")   Wt 59.4 kg (131 lb)   SpO2 98%   BMI 19.92 kg/m² .    Constitutional: Awake and alert. Nontoxic  HENT:  Grossly normal  Eyes: Grossly normal  Neck: Normal range of motion  Cardiovascular: Normal heart rate   Thorax & Lungs: No respiratory distress  Abdomen: Nontender  Skin:  No pathologic rash.   Extremities: Well perfused  Psychiatric: Affect normal    Labs  Results for orders placed or performed during the hospital encounter of 06/28/22   Urine Drug Screen   Result Value Ref Range    Amphetamines Urine Negative Negative    Barbiturates Negative Negative    Benzodiazepines Negative Negative    Cocaine Metabolite Negative Negative    Methadone Negative Negative    Opiates Negative Negative    Oxycodone Negative Negative    Phencyclidine -Pcp Negative Negative    Propoxyphene Negative Negative    Cannabinoid Metab Negative Negative   POC BREATHALIZER   Result Value Ref Range    POC Breathalizer 0.00 0.00 - 0.01 Percent       Problem List  1.  Cognitive delay with behavioral disturbance-pending safe discharge planning, home medications have been resumed      Electronically signed by: Shaniqua Blair M.D., 6/30/2022 7:29 AM    "

## 2022-06-30 NOTE — ED NOTES
Pt AOx4, eating dinner, airway patent, rr even and unlabored, nad noted. Reports si. Pt states his plan is to bang head. Last attempt two weeks ago.

## 2022-06-30 NOTE — ED NOTES
Patient asking about dinner. Notified the meal trays will be delivered soon. Sitter in view of patient.

## 2022-06-30 NOTE — ED NOTES
Pt ambulatory to restroom with steady gait. No new complaints or distress noted. Calm and cooperative at this time.

## 2022-06-30 NOTE — DISCHARGE PLANNING
Alert Team Note:    Attempted to contact Tasia at [facility] to inquire about bed availability. Writer left a message and will try again this afternoon.
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
## 2022-06-30 NOTE — ED NOTES
Pt escorted to shower with security and CNA. Pt provided with clean linen and clothes. Pt calm and cooperative.

## 2022-06-30 NOTE — ED NOTES
Pt resting in room, easy, equal chest rise and fall. Pt remains calm and cooperative under direct obs of designee.

## 2022-07-01 VITALS
HEIGHT: 68 IN | SYSTOLIC BLOOD PRESSURE: 123 MMHG | BODY MASS INDEX: 19.85 KG/M2 | HEART RATE: 78 BPM | RESPIRATION RATE: 16 BRPM | DIASTOLIC BLOOD PRESSURE: 84 MMHG | TEMPERATURE: 98.4 F | WEIGHT: 131 LBS | OXYGEN SATURATION: 94 %

## 2022-07-01 PROCEDURE — 700102 HCHG RX REV CODE 250 W/ 637 OVERRIDE(OP): Performed by: EMERGENCY MEDICINE

## 2022-07-01 PROCEDURE — 99281 EMR DPT VST MAYX REQ PHY/QHP: CPT | Performed by: PSYCHIATRY & NEUROLOGY

## 2022-07-01 PROCEDURE — A9270 NON-COVERED ITEM OR SERVICE: HCPCS | Performed by: EMERGENCY MEDICINE

## 2022-07-01 PROCEDURE — 700102 HCHG RX REV CODE 250 W/ 637 OVERRIDE(OP): Performed by: PSYCHIATRY & NEUROLOGY

## 2022-07-01 RX ADMIN — VALBENAZINE 80 MG: 80 CAPSULE ORAL at 05:11

## 2022-07-01 RX ADMIN — ACETAMINOPHEN 650 MG: 325 TABLET, FILM COATED ORAL at 05:08

## 2022-07-01 RX ADMIN — CLONIDINE HYDROCHLORIDE 0.1 MG: 0.1 TABLET ORAL at 12:20

## 2022-07-01 RX ADMIN — PROPRANOLOL HYDROCHLORIDE 10 MG: 10 TABLET ORAL at 05:08

## 2022-07-01 RX ADMIN — CLONIDINE HYDROCHLORIDE 0.1 MG: 0.1 TABLET ORAL at 05:08

## 2022-07-01 RX ADMIN — CLONAZEPAM 2 MG: 0.5 TABLET ORAL at 08:02

## 2022-07-01 RX ADMIN — IBUPROFEN 600 MG: 600 TABLET, FILM COATED ORAL at 08:03

## 2022-07-01 ASSESSMENT — ENCOUNTER SYMPTOMS
GASTROINTESTINAL NEGATIVE: 1
HEADACHES: 0
DEPRESSION: 0
INSOMNIA: 0
CARDIOVASCULAR NEGATIVE: 1
RESPIRATORY NEGATIVE: 1

## 2022-07-01 NOTE — ED NOTES
Pt medicated per MAR  C/o back pain - Tylenol PRN administered  No other needs at this time  Sitter in direct view of patient

## 2022-07-01 NOTE — ED NOTES
Legal hold discontinued.  Plan for pt to go back to [facility] after 1230.  Pt grandmother called and updated per pt ant pt talked with grandmother.

## 2022-07-01 NOTE — ED NOTES
Pt medicated per MAR.  Pt reports continued back pain even though tylenol given.    Water provided and channel changed.   Sitter at bedside.

## 2022-07-01 NOTE — DISCHARGE PLANNING
MSW received call from bedside RN. Pt is being d/c off legal hold. MSW called pt's group home [facility] and spoke to owner Kimberly ([phone]). Kimberly requested psych note be faxed to [phone]. MSW faxed note. Kimberly stated the group home will be here after 1230 to  pt. MSW updated bedside RN.

## 2022-07-01 NOTE — ED NOTES
Staff from group home here for discharge back to [facility].  Discharge instructions given, informed of psychiatrist recommendations on medications.  All questions answered.   Pt to follow-up with Psychiatrist and PCP, return to ER if symptoms worsen as discussed.  Pt verbalized understanding.  All belongings with pt.  Pt ambulated to [facility].

## 2022-07-01 NOTE — ED PROVIDER NOTES
"Patient:Leodan Hyatt  Patient : 1985  Patient MRN: [id number]  Patient PCP: Tom Aquino M.D.    Admit Date: 2022  Discharge Date and Time: 22 12:16 PM  Discharge Diagnosis:   1. Non compliance w medication regimen     2. Tardive dyskinesia       Discharge Attending: Jeanna Kaufman M.D.  Discharge Service: ED Observation    ED Course  Leodan is a 37 y.o. male who was evaluated at [facility] for inability to care for self.  Patient has been living in a group home.  He has been noncompliant with his medications.  He has a history of chronic tardive dyskinesia.  He had been denying any suicidal or homicidal ideations.  He was placed in ED observation in order to get evaluated by psychiatry.  Psychiatry saw him this morning and has discharged his legal hold.  He will be going back to his group home.     This is the psychiatric Assessment (please see their note for further information): Pt has been calm, pleasant and cooperative, without any aggression or agitation, neither has required PRN for agitation/agression since admission to the ED. Yesterday he had significant EPS/Akathisia, which improved significantly after receiving benadryl 50mg IM. I recommend for patient not re-start antipsychotics at this time.     Discharge Exam:  /70   Pulse 71   Temp 36.8 °C (98.3 °F) (Temporal)   Resp 18   Ht 1.727 m (5' 8\")   Wt 59.4 kg (131 lb)   SpO2 99% Comment: Room air  BMI 19.92 kg/m² .    Constitutional: Awake and alert. Nontoxic  HENT:  Grossly normal  Eyes: Grossly normal  Neck: Normal range of motion  Cardiovascular: Normal heart rate   Thorax & Lungs: No respiratory distress  Abdomen: Nontender  Skin:  No pathologic rash.   Extremities: Well perfused  Neurologic: dyskneasia movements  Psychiatric: Affect normal    Labs  Results for orders placed or performed during the hospital encounter of 22   Urine Drug Screen   Result Value Ref Range    Amphetamines Urine Negative " Negative    Barbiturates Negative Negative    Benzodiazepines Negative Negative    Cocaine Metabolite Negative Negative    Methadone Negative Negative    Opiates Negative Negative    Oxycodone Negative Negative    Phencyclidine -Pcp Negative Negative    Propoxyphene Negative Negative    Cannabinoid Metab Negative Negative   POC BREATHALIZER   Result Value Ref Range    POC Breathalizer 0.00 0.00 - 0.01 Percent       Radiology  No orders to display         Medications:   New Prescriptions    No medications on file     Patient is discharged from ED observation at 12:21 PM.  He will be going back to his group home.    Discharge Condition: Stable    Electronically signed by: Jeanna Kaufman M.D., 7/1/2022 12:16 PM
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
## 2022-07-01 NOTE — DISCHARGE PLANNING
TCN following. HTH/SCP chart review completed. Colaborated with Alert Team RN - possible DC to   today. [facility] no longer on Legal Hold as of 1120 7/1/2022. Will defer to Hospital LSW and/or [facility] Alert Team for DC planning.

## 2022-07-01 NOTE — ED NOTES
Report received from Beni CASE  Pt eating dinner tray at this time  Sitter in direct view of patient

## 2022-07-19 ENCOUNTER — OFFICE VISIT (OUTPATIENT)
Dept: MEDICAL GROUP | Facility: PHYSICIAN GROUP | Age: 37
End: 2022-07-19
Payer: MEDICARE

## 2022-07-19 VITALS
HEART RATE: 70 BPM | SYSTOLIC BLOOD PRESSURE: 100 MMHG | WEIGHT: 127 LBS | BODY MASS INDEX: 19.25 KG/M2 | HEIGHT: 68 IN | DIASTOLIC BLOOD PRESSURE: 70 MMHG | OXYGEN SATURATION: 90 % | TEMPERATURE: 98.2 F

## 2022-07-19 DIAGNOSIS — J69.0 ASPIRATION PNEUMONIA, UNSPECIFIED ASPIRATION PNEUMONIA TYPE, UNSPECIFIED LATERALITY, UNSPECIFIED PART OF LUNG (HCC): ICD-10-CM

## 2022-07-19 DIAGNOSIS — R63.4 WEIGHT LOSS: ICD-10-CM

## 2022-07-19 PROCEDURE — 99213 OFFICE O/P EST LOW 20 MIN: CPT | Performed by: FAMILY MEDICINE

## 2022-07-19 RX ORDER — MIRTAZAPINE 30 MG/1
30 TABLET, FILM COATED ORAL NIGHTLY
COMMUNITY
Start: 2022-07-14 | End: 2023-11-16

## 2022-07-19 RX ORDER — HYDROXYZINE PAMOATE 50 MG/1
50 CAPSULE ORAL 2 TIMES DAILY
COMMUNITY
Start: 2022-07-14

## 2022-07-19 RX ORDER — DOXYCYCLINE 100 MG/1
CAPSULE ORAL
COMMUNITY
Start: 2022-04-28 | End: 2022-09-14

## 2022-07-19 RX ORDER — KETOTIFEN FUMARATE 0.025 %
1 DROPS OPHTHALMIC (EYE) DAILY
Qty: 2844 ML | Refills: 1 | Status: SHIPPED | OUTPATIENT
Start: 2022-07-19 | End: 2023-02-19

## 2022-07-19 RX ORDER — CLONIDINE HYDROCHLORIDE 0.1 MG/1
0.1 TABLET ORAL 2 TIMES DAILY
COMMUNITY
Start: 2022-07-14

## 2022-07-19 ASSESSMENT — ENCOUNTER SYMPTOMS
COUGH: 0
FEVER: 0
NAUSEA: 0
PALPITATIONS: 0
CHILLS: 0
SHORTNESS OF BREATH: 0
DOUBLE VISION: 0
HEADACHES: 0
SORE THROAT: 0
MYALGIAS: 0
BLURRED VISION: 0
DIZZINESS: 0
VOMITING: 0

## 2022-07-19 ASSESSMENT — FIBROSIS 4 INDEX: FIB4 SCORE: 0.42

## 2022-07-19 NOTE — ASSESSMENT & PLAN NOTE
Pt requested protein shake  Discussed aspiration risks  Pt would like to cont  As he states he has lost weight from the tremors  rtc if concerns

## 2022-07-19 NOTE — ASSESSMENT & PLAN NOTE
Resolved  ER if concerns  Cont upright sitting position  Tremors have decreased with change in medication

## 2022-07-19 NOTE — PROGRESS NOTES
Subjective:     CC: ER f/u    HPI:   Leodan presents today with group home care giver    1) would like protein shake, has lot weight due to tremor  Discussed risks of aspiration    2) Aspiration pna, resolved    3) hair loss  On occiptal area only,    Care giver states likely from rubbing head during his tremors    4) smoking again    No problems updated.    Current Outpatient Medications Ordered in Epic   Medication Sig Dispense Refill   • hydrOXYzine pamoate (VISTARIL) 50 MG Cap      • mirtazapine (REMERON) 15 MG Tab      • cloNIDine (CATAPRES) 0.1 MG Tab      • doxycycline (MONODOX) 100 MG capsule      • Nutritional Supplements (HIGH-PROTEIN NUTRITIONAL SHAKE) Liquid Take 1 Units by mouth every day. 2844 mL 1   • NON SPECIFIED Patient may use 1 bottle of protein shake OTC 30 Each 3   • nicotine (NICOTROL) 10 MG inhaler Inhale 1 Puff as needed for Smoking Cessation. Use 1-4 cartiages per day to taper off smoking 168 Each 0   • nicotine (NICODERM) 7 MG/24HR PATCH 24 HR Place 1 Patch on the skin every 24 hours. 30 Patch 3   • propranolol (INDERAL) 10 MG Tab Take  by mouth 2 times a day.     • Valbenazine Tosylate (INGREZZA) 80 MG Cap Take 80 mg by mouth every day. Indications: Tardive Dyskinesia     • clonazepam (KLONOPIN) 2 MG tablet Take 2 mg by mouth 2 times a day as needed (Shaking).     • ARIPiprazole (ABILIFY) 10 MG Tab Take 1 Tablet by mouth every day. (Patient not taking: Reported on 7/19/2022) 30 Tablet 0     No current Epic-ordered facility-administered medications on file.     ROS:  Review of Systems   Constitutional: Negative for chills and fever.   HENT: Negative for ear pain and sore throat.    Eyes: Negative for blurred vision and double vision.   Respiratory: Negative for cough and shortness of breath.    Cardiovascular: Negative for chest pain and palpitations.   Gastrointestinal: Negative for nausea and vomiting.   Genitourinary: Negative for dysuria and hematuria.   Musculoskeletal: Negative for  "myalgias.   Neurological: Negative for dizziness and headaches.       Objective:     Exam:  /70 (BP Location: Left arm, Patient Position: Sitting, BP Cuff Size: Adult)   Pulse 70   Temp 36.8 °C (98.2 °F) (Temporal)   Ht 1.727 m (5' 8\")   Wt 57.6 kg (127 lb)   SpO2 90%   BMI 19.31 kg/m²  Body mass index is 19.31 kg/m².    Physical Exam  Constitutional:       General: He is not in acute distress.     Appearance: Normal appearance. He is not ill-appearing, toxic-appearing or diaphoretic.   Pulmonary:      Effort: No respiratory distress.      Breath sounds: Rhonchi present.   Abdominal:      General: There is no distension.      Tenderness: There is no abdominal tenderness.   Neurological:      Mental Status: He is alert.      Coordination: Coordination normal.      Gait: Gait normal.         Assessment & Plan:     37 y.o. male with the following -     Problem List Items Addressed This Visit    None     Visit Diagnoses     Weight loss        Relevant Medications    Nutritional Supplements (HIGH-PROTEIN NUTRITIONAL SHAKE) Liquid    NON SPECIFIED        No follow-ups on file.    Please note that this dictation was created using voice recognition software. I have made every reasonable attempt to correct obvious errors, but I expect that there are errors of grammar and possibly content that I did not discover before finalizing the note.      "

## 2022-07-23 ENCOUNTER — HOSPITAL ENCOUNTER (EMERGENCY)
Facility: MEDICAL CENTER | Age: 37
End: 2022-07-23
Attending: EMERGENCY MEDICINE
Payer: MEDICARE

## 2022-07-23 VITALS
HEART RATE: 83 BPM | TEMPERATURE: 97.6 F | WEIGHT: 126.32 LBS | SYSTOLIC BLOOD PRESSURE: 122 MMHG | BODY MASS INDEX: 19.15 KG/M2 | DIASTOLIC BLOOD PRESSURE: 66 MMHG | RESPIRATION RATE: 16 BRPM | HEIGHT: 68 IN | OXYGEN SATURATION: 96 %

## 2022-07-23 DIAGNOSIS — R10.13 EPIGASTRIC PAIN: ICD-10-CM

## 2022-07-23 LAB
ALBUMIN SERPL BCP-MCNC: 4 G/DL (ref 3.2–4.9)
ALBUMIN/GLOB SERPL: 1.3 G/DL
ALP SERPL-CCNC: 151 U/L (ref 30–99)
ALT SERPL-CCNC: 13 U/L (ref 2–50)
ANION GAP SERPL CALC-SCNC: 11 MMOL/L (ref 7–16)
APPEARANCE UR: CLEAR
AST SERPL-CCNC: 16 U/L (ref 12–45)
BASOPHILS # BLD AUTO: 0.7 % (ref 0–1.8)
BASOPHILS # BLD: 0.05 K/UL (ref 0–0.12)
BILIRUB SERPL-MCNC: <0.2 MG/DL (ref 0.1–1.5)
BILIRUB UR QL STRIP.AUTO: NEGATIVE
BUN SERPL-MCNC: 10 MG/DL (ref 8–22)
CALCIUM SERPL-MCNC: 9.2 MG/DL (ref 8.5–10.5)
CHLORIDE SERPL-SCNC: 100 MMOL/L (ref 96–112)
CO2 SERPL-SCNC: 25 MMOL/L (ref 20–33)
COLOR UR: YELLOW
CREAT SERPL-MCNC: 0.42 MG/DL (ref 0.5–1.4)
EOSINOPHIL # BLD AUTO: 0.17 K/UL (ref 0–0.51)
EOSINOPHIL NFR BLD: 2.2 % (ref 0–6.9)
ERYTHROCYTE [DISTWIDTH] IN BLOOD BY AUTOMATED COUNT: 38.8 FL (ref 35.9–50)
GFR SERPLBLD CREATININE-BSD FMLA CKD-EPI: 142 ML/MIN/1.73 M 2
GLOBULIN SER CALC-MCNC: 3 G/DL (ref 1.9–3.5)
GLUCOSE SERPL-MCNC: 112 MG/DL (ref 65–99)
GLUCOSE UR STRIP.AUTO-MCNC: NEGATIVE MG/DL
HCT VFR BLD AUTO: 40.5 % (ref 42–52)
HGB BLD-MCNC: 12.9 G/DL (ref 14–18)
IMM GRANULOCYTES # BLD AUTO: 0.02 K/UL (ref 0–0.11)
IMM GRANULOCYTES NFR BLD AUTO: 0.3 % (ref 0–0.9)
KETONES UR STRIP.AUTO-MCNC: NEGATIVE MG/DL
LEUKOCYTE ESTERASE UR QL STRIP.AUTO: NEGATIVE
LIPASE SERPL-CCNC: 32 U/L (ref 11–82)
LYMPHOCYTES # BLD AUTO: 1.7 K/UL (ref 1–4.8)
LYMPHOCYTES NFR BLD: 22.3 % (ref 22–41)
MCH RBC QN AUTO: 26.2 PG (ref 27–33)
MCHC RBC AUTO-ENTMCNC: 31.9 G/DL (ref 33.7–35.3)
MCV RBC AUTO: 82.2 FL (ref 81.4–97.8)
MICRO URNS: NORMAL
MONOCYTES # BLD AUTO: 0.7 K/UL (ref 0–0.85)
MONOCYTES NFR BLD AUTO: 9.2 % (ref 0–13.4)
NEUTROPHILS # BLD AUTO: 4.97 K/UL (ref 1.82–7.42)
NEUTROPHILS NFR BLD: 65.3 % (ref 44–72)
NITRITE UR QL STRIP.AUTO: NEGATIVE
NRBC # BLD AUTO: 0 K/UL
NRBC BLD-RTO: 0 /100 WBC
PH UR STRIP.AUTO: 6.5 [PH] (ref 5–8)
PLATELET # BLD AUTO: 275 K/UL (ref 164–446)
PMV BLD AUTO: 9.9 FL (ref 9–12.9)
POTASSIUM SERPL-SCNC: 4.2 MMOL/L (ref 3.6–5.5)
PROT SERPL-MCNC: 7 G/DL (ref 6–8.2)
PROT UR QL STRIP: NEGATIVE MG/DL
RBC # BLD AUTO: 4.93 M/UL (ref 4.7–6.1)
RBC UR QL AUTO: NEGATIVE
SODIUM SERPL-SCNC: 136 MMOL/L (ref 135–145)
SP GR UR STRIP.AUTO: 1
UROBILINOGEN UR STRIP.AUTO-MCNC: 0.2 MG/DL
WBC # BLD AUTO: 7.6 K/UL (ref 4.8–10.8)

## 2022-07-23 PROCEDURE — 83690 ASSAY OF LIPASE: CPT

## 2022-07-23 PROCEDURE — 99284 EMERGENCY DEPT VISIT MOD MDM: CPT

## 2022-07-23 PROCEDURE — A9270 NON-COVERED ITEM OR SERVICE: HCPCS | Performed by: EMERGENCY MEDICINE

## 2022-07-23 PROCEDURE — 85025 COMPLETE CBC W/AUTO DIFF WBC: CPT

## 2022-07-23 PROCEDURE — 700102 HCHG RX REV CODE 250 W/ 637 OVERRIDE(OP): Performed by: EMERGENCY MEDICINE

## 2022-07-23 PROCEDURE — 80053 COMPREHEN METABOLIC PANEL: CPT

## 2022-07-23 PROCEDURE — 81003 URINALYSIS AUTO W/O SCOPE: CPT

## 2022-07-23 PROCEDURE — 36415 COLL VENOUS BLD VENIPUNCTURE: CPT

## 2022-07-23 RX ORDER — FAMOTIDINE 20 MG/1
20 TABLET, FILM COATED ORAL ONCE
Status: COMPLETED | OUTPATIENT
Start: 2022-07-23 | End: 2022-07-23

## 2022-07-23 RX ORDER — SUCRALFATE 1 G/1
1 TABLET ORAL
Qty: 56 TABLET | Refills: 0 | Status: SHIPPED | OUTPATIENT
Start: 2022-07-23 | End: 2022-08-06

## 2022-07-23 RX ADMIN — LIDOCAINE HYDROCHLORIDE 30 ML: 20 SOLUTION OROPHARYNGEAL at 02:46

## 2022-07-23 RX ADMIN — FAMOTIDINE 20 MG: 20 TABLET, FILM COATED ORAL at 02:46

## 2022-07-23 ASSESSMENT — FIBROSIS 4 INDEX: FIB4 SCORE: 0.42

## 2022-07-23 NOTE — ED TRIAGE NOTES
"Chief Complaint   Patient presents with   • Abdominal Pain     Mid ABD pain began 0100, sharp moderate pain, no preceding factors     BIB [facility], WC to triage for above complaint. Hx tardive dyskinesia and psych.    ABD protocols ordered. Pt brought to [facility] office for blood draw. UA obtained. Pt educated of triage process and informed to contact staff if situation changes.    /77   Pulse 93   Temp 36.4 °C (97.6 °F) (Temporal)   Resp 16   Ht 1.727 m (5' 8\")   Wt 57.3 kg (126 lb 5.2 oz)   SpO2 98%   BMI 19.21 kg/m²      "

## 2022-07-23 NOTE — ED PROVIDER NOTES
"ED Provider Note    CHIEF COMPLAINT  Chief Complaint   Patient presents with   • Abdominal Pain     Mid ABD pain began 0100, sharp moderate pain, no preceding factors     HPI  Patient is a 37-year-old male with a significant history for underlying psychiatric history and prior tardive dyskinesia who presents to the emergency room for complaints of mid abdominal discomfort.  He had noticed that there had been a small amount of nausea yesterday and tonight he awoke at 1 AM with a somewhat sharp and burning mid upper abdominal discomfort.  It occasionally radiates to the right portion of his abdomen, partially radiates up into his chest.  He does endorse having burping which is unchanged for a long period of time.  He is not on GI prophylaxis medications and is unsure if you have ever had a diagnosis of an ulcer.  He denies any recent medication changes, does not believe that this is associated with any recent food ingestions, has not had any diarrheal illness, fevers chills or other acute constitutional complaints.  He denies any dysuria, hematuria or flank pain.    PPE Note: I personally donned full PPE for all patient encounters during this visit, including being clean-shaven with an N95 respirator mask, gloves, and goggles.     REVIEW OF SYSTEMS  See HPI for further details. All other systems are negative.     PAST MEDICAL HISTORY   has a past medical history of ADHD (attention deficit hyperactivity disorder), Psychiatric problem, and Tardive dyskinesia.    SOCIAL HISTORY  Social History     Tobacco Use   • Smoking status: Current Every Day Smoker     Packs/day: 0.25     Types: Cigarettes     Start date: 2/27/2006   • Smokeless tobacco: Current User   Vaping Use   • Vaping Use: Former   Substance and Sexual Activity   • Alcohol use: Not Currently     Comment: \"maybe 3 times a year\"   • Drug use: Not Currently     Types: Inhaled, Oral     Comment: marijuana occasionally   • Sexual activity: Not on file     SURGICAL " "HISTORY   has a past surgical history that includes sintia rectal abscess incision and drainage (N/A, 2/26/2016).    CURRENT MEDICATIONS  Home Medications    **Home medications have not yet been reviewed for this encounter**       ALLERGIES  Allergies   Allergen Reactions   • Penicillins        PHYSICAL EXAM  VITAL SIGNS: /71   Pulse 86   Temp 36.4 °C (97.6 °F)   Resp 16   Ht 1.727 m (5' 8\")   Wt 57.3 kg (126 lb 5.2 oz)   SpO2 96%   BMI 19.21 kg/m²   Pulse ox interpretation: I interpret this pulse ox as normal.  Genl: M sitting in chair comfortably, speaking clearly, appears in no acute distress  Head: NC/AT   ENT: Mucous membranes moist, posterior pharynx clear, uvula midline, nares patent bilaterally   Eyes: Normal sclera, pupils equal round reactive to light  Neck: Supple, FROM, no LAD appreciated   Pulmonary: Lungs are clear to auscultation bilaterally  Chest: No TTP  CV:  RRR, no murmur appreciated, pulses 2+ in both upper and lower extremities,  Abdomen: soft,epigastric pain, negative murphys sign, no mcburneys point tenderness.  ND; no rebound/guarding, no masses palpated, no HSM   : no CVA or suprapubic tenderness   Musculoskeletal: Pain free ROM of the neck. Moving upper and lower extremities and spontaneous in coordinated fashion  Neuro: A&Ox4 (person, place, time, situation), speech fluent, gait steady, no focal deficits appreciated, No cerebellar signs  Skin: No rash or lesions.  No pallor or jaundice.  No cyanosis.  Warm and dry.     DIAGNOSTIC STUDIES / PROCEDURES    LABS  Labs Reviewed   CBC WITH DIFFERENTIAL - Abnormal; Notable for the following components:       Result Value    Hemoglobin 12.9 (*)     Hematocrit 40.5 (*)     MCH 26.2 (*)     MCHC 31.9 (*)     All other components within normal limits   COMP METABOLIC PANEL - Abnormal; Notable for the following components:    Glucose 112 (*)     Creatinine 0.42 (*)     Alkaline Phosphatase 151 (*)     All other components within normal " limits   LIPASE   URINALYSIS   ESTIMATED GFR     RADIOLOGY  No orders to display     COURSE & MEDICAL DECISION MAKING  Pertinent Labs & Imaging studies reviewed. (See chart for details)    DDX:esophagitis, pill esophagitis,UTI, PUD, pancreatitis, gastritis, GERD, cholelithiasis, cholecystitis, choledocolithiasis    MDM    Initial evaluation at 0239:  Patient is seen and evaluated for symptoms as described above.  The patient is nontoxic in appearance, has a very reassuring abdominal exam and reports having small amount of discomfort with burning radiation of his upper abdominal pain.  Believe this is likely a gastrointestinal source as he has no other constitutional complaints and thankfully has reassuring serial exams.  At this time the patient was given GI cocktail and was generally improved.  I have offered Carafate and I am referring him to his outpatient follow-up.  Questions addressed at bedside and discharged home in stable condition.    FINAL IMPRESSION  Visit Diagnoses     ICD-10-CM   1. Epigastric pain  R10.13     Electronically signed by: Ari Balncas M.D., 7/23/2022 2:39 AM

## 2022-07-23 NOTE — ED NOTES
Discharge paperwork given to pt, cab called to  pt and take to group home [facility], pt ambulated with steady gait to the ER exit

## 2022-07-26 ENCOUNTER — TELEPHONE (OUTPATIENT)
Dept: SOCIAL WORK | Facility: CLINIC | Age: 37
End: 2022-07-26
Payer: MEDICARE

## 2022-07-26 NOTE — TELEPHONE ENCOUNTER
ED Follow-up  Call Attempts: 1  Date of ED visit: 07/23/22  Call Outcome: Reviewed ED visit with patient  Since you've been home, how have you been feeling?: Better  Were you prescribed any medications?: Yes  Did you  your medications from the pharmacy?: Yes  Do you have any questions regarding your medications?: No  Do you have any questions about your discharge instructions?: No  RN Recommendations: Other  Additional details: Reminded member to call PCP for f/u appointment so he doesn't run out of medications. Member acknowledged that he would do so.   Total time spent (mins): 5

## 2022-08-12 ENCOUNTER — APPOINTMENT (OUTPATIENT)
Dept: MEDICAL GROUP | Facility: PHYSICIAN GROUP | Age: 37
End: 2022-08-12
Payer: MEDICARE

## 2022-09-14 ENCOUNTER — HOSPITAL ENCOUNTER (OUTPATIENT)
Dept: LAB | Facility: MEDICAL CENTER | Age: 37
End: 2022-09-14
Attending: STUDENT IN AN ORGANIZED HEALTH CARE EDUCATION/TRAINING PROGRAM
Payer: MEDICARE

## 2022-09-14 ENCOUNTER — OFFICE VISIT (OUTPATIENT)
Dept: MEDICAL GROUP | Facility: PHYSICIAN GROUP | Age: 37
End: 2022-09-14
Payer: MEDICARE

## 2022-09-14 VITALS
SYSTOLIC BLOOD PRESSURE: 90 MMHG | OXYGEN SATURATION: 94 % | HEIGHT: 68 IN | BODY MASS INDEX: 17.58 KG/M2 | TEMPERATURE: 97.7 F | WEIGHT: 116 LBS | HEART RATE: 89 BPM | DIASTOLIC BLOOD PRESSURE: 50 MMHG

## 2022-09-14 DIAGNOSIS — R74.8 ELEVATED ALKALINE PHOSPHATASE LEVEL: ICD-10-CM

## 2022-09-14 DIAGNOSIS — Z23 NEED FOR VACCINATION: ICD-10-CM

## 2022-09-14 DIAGNOSIS — H61.23 BILATERAL IMPACTED CERUMEN: ICD-10-CM

## 2022-09-14 DIAGNOSIS — D50.8 OTHER IRON DEFICIENCY ANEMIA: ICD-10-CM

## 2022-09-14 PROBLEM — Z02.5 ENCOUNTER FOR EXAMINATION FOR PARTICIPATION IN SPORT: Status: RESOLVED | Noted: 2018-10-10 | Resolved: 2022-09-14

## 2022-09-14 PROBLEM — Z76.89 ENCOUNTER TO ESTABLISH CARE: Status: RESOLVED | Noted: 2022-04-06 | Resolved: 2022-09-14

## 2022-09-14 PROBLEM — J18.9 PNEUMONIA: Status: RESOLVED | Noted: 2022-05-03 | Resolved: 2022-09-14

## 2022-09-14 PROBLEM — L84 CALLUS OF FOOT: Status: RESOLVED | Noted: 2021-03-10 | Resolved: 2022-09-14

## 2022-09-14 PROBLEM — Z00.00 ENCOUNTER FOR GENERAL ADULT MEDICAL EXAMINATION WITHOUT ABNORMAL FINDINGS: Status: RESOLVED | Noted: 2017-10-30 | Resolved: 2022-09-14

## 2022-09-14 LAB
25(OH)D3 SERPL-MCNC: 23 NG/ML (ref 30–100)
GGT SERPL-CCNC: 30 U/L (ref 7–51)

## 2022-09-14 PROCEDURE — 90677 PCV20 VACCINE IM: CPT | Performed by: STUDENT IN AN ORGANIZED HEALTH CARE EDUCATION/TRAINING PROGRAM

## 2022-09-14 PROCEDURE — 90715 TDAP VACCINE 7 YRS/> IM: CPT | Performed by: STUDENT IN AN ORGANIZED HEALTH CARE EDUCATION/TRAINING PROGRAM

## 2022-09-14 PROCEDURE — 83915 ASSAY OF NUCLEOTIDASE: CPT

## 2022-09-14 PROCEDURE — 82977 ASSAY OF GGT: CPT

## 2022-09-14 PROCEDURE — G0009 ADMIN PNEUMOCOCCAL VACCINE: HCPCS | Performed by: STUDENT IN AN ORGANIZED HEALTH CARE EDUCATION/TRAINING PROGRAM

## 2022-09-14 PROCEDURE — 99214 OFFICE O/P EST MOD 30 MIN: CPT | Mod: 25 | Performed by: STUDENT IN AN ORGANIZED HEALTH CARE EDUCATION/TRAINING PROGRAM

## 2022-09-14 PROCEDURE — 36415 COLL VENOUS BLD VENIPUNCTURE: CPT

## 2022-09-14 PROCEDURE — 82306 VITAMIN D 25 HYDROXY: CPT

## 2022-09-14 PROCEDURE — 90472 IMMUNIZATION ADMIN EACH ADD: CPT | Performed by: STUDENT IN AN ORGANIZED HEALTH CARE EDUCATION/TRAINING PROGRAM

## 2022-09-14 RX ORDER — FERROUS SULFATE 325(65) MG
325 TABLET ORAL
Qty: 90 TABLET | Refills: 0 | Status: SHIPPED | OUTPATIENT
Start: 2022-09-14 | End: 2023-03-13

## 2022-09-14 ASSESSMENT — FIBROSIS 4 INDEX: FIB4 SCORE: 0.6

## 2022-09-14 NOTE — PROGRESS NOTES
Subjective:     CC:  Diagnoses of Elevated alkaline phosphatase level, Other iron deficiency anemia, Need for vaccination, and Bilateral impacted cerumen were pertinent to this visit.    HISTORY OF THE PRESENT ILLNESS: Patient is a 37 y.o. male.  Patient is new to me today.  His former provider was Dr. Uriel Aquino MD. Dr. Hand provides his psychiatry care.  He is present today with his group home caregiver Valente Ozuna.  This pleasant patient is here today to discuss:    1.  Elevated alkaline phosphatase level  Asymptomatic laboratory finding.  Patient spends most of his time indoors and does not supplement vitamin D.  No report of right upper quadrant pain.    2.  Iron deficiency anemia  Patient has a history of iron deficient anemia.  He does not currently supplement iron.  Anemia is present on most recent CBC.    3.  Bilateral impacted cerumen  Incidental finding on physical exam.    Active Diagnosis:    Patient Active Problem List   Diagnosis    Abdominal pain, epigastric    Abscess, perianal    Anemia, iron deficiency    Attention deficit hyperactivity disorder (ADHD), combined type    Obesity    Mild intellectual disability    Mood disorder (HCC)    Neoplasm of uncertain behavior of skin    Nicotine addiction    Onychogryphosis    Disorder of tooth development    Unspecified contact dermatitis, unspecified cause    Skin disorder    Weight loss    Elevated alkaline phosphatase level      Current Outpatient Medications Ordered in Epic   Medication Sig Dispense Refill    ferrous sulfate 325 (65 Fe) MG tablet Take 1 Tablet by mouth every Monday, Wednesday, and Friday. 90 Tablet 0    hydrOXYzine pamoate (VISTARIL) 50 MG Cap       mirtazapine (REMERON) 15 MG Tab       cloNIDine (CATAPRES) 0.1 MG Tab       Nutritional Supplements (HIGH-PROTEIN NUTRITIONAL SHAKE) Liquid Take 1 Units by mouth every day. 2844 mL 1    NON SPECIFIED Patient may use 1 bottle of protein shake OTC 30 Each 3    nicotine  "(NICOTROL) 10 MG inhaler Inhale 1 Puff as needed for Smoking Cessation. Use 1-4 cartiages per day to taper off smoking 168 Each 0    nicotine (NICODERM) 7 MG/24HR PATCH 24 HR Place 1 Patch on the skin every 24 hours. 30 Patch 3    propranolol (INDERAL) 10 MG Tab Take  by mouth 2 times a day.      Valbenazine Tosylate (INGREZZA) 80 MG Cap Take 80 mg by mouth every day. Indications: Tardive Dyskinesia      clonazepam (KLONOPIN) 2 MG tablet Take 2 mg by mouth 2 times a day as needed (Shaking).      ARIPiprazole (ABILIFY) 10 MG Tab Take 1 Tablet by mouth every day. (Patient not taking: No sig reported) 30 Tablet 0     No current Epic-ordered facility-administered medications on file.     ROS:   Gen: No fevers/chills, no changes in weight  HEENT: No changes in vision/hearing, sore throat.  Pulm: No cough, unexplained SOB.  CV: No chest pain/pressure, no palpitations  GI: No nausea/vomiting, no diarrhea  : No dysuria/nocturia  MSk: No myalgias  Skin: No rash/skin changes  Neuro: No headaches, no numbness/tingling  Heme/Lymph: no easy bruising      Objective:     Exam: BP (!) 90/50 (BP Location: Left arm, Patient Position: Sitting, BP Cuff Size: Adult)   Pulse 89   Temp 36.5 °C (97.7 °F) (Temporal)   Ht 1.727 m (5' 8\")   Wt 52.6 kg (116 lb)   SpO2 94%  Body mass index is 17.64 kg/m².    General: Normal appearing. No distress.  HEENT: Normocephalic. Eyes conjunctiva clear lids without ptosis. Pupils equal and reactive to light accommodation. Ears normal shape and contour. Canals are clear bilaterally after lavage and curette disimpaction, tympanic membranes are benign. Nasal mucosa benign, oropharynx is without erythema, edema or exudates.   Neck: Supple without JVD. Thyroid is not enlarged.  Pulmonary: Clear to ausculation.  Normal effort. No rales, ronchi, or wheezing.  Thoracic exam: Asymmetry of bottom 4-5 ribs.  Right side appears as a bulge.  Cardiovascular: Regular rate and rhythm without murmur. Radial pulses " are intact and equal bilaterally.  Abdomen: Nondistended   neurologic: Grossly nonfocal.  CN II through XII intact.  Lymph: No cervical or supraclavicular lymph nodes are palpable  Skin: Warm and dry.  No obvious lesions.  Musculoskeletal: Normal gait. No extremity cyanosis, clubbing, or edema.  Psych: Normal mood and affect. Alert and oriented x3. Judgment and insight are normal.    A chaperone was offered to the patient during today's exam. Patient declined chaperone.    Labs:   7/23/2022:  -CBC showing H&H 12.9/40.5, MCV 82.2.  -CMP showing alkaline phosphatase of 151 and elevated fasting glucose of 112.    Assessment & Plan:   37 y.o. male with the following -    1.  Elevated alkaline phosphatase level  -Undiagnosed new problem with uncertain prognosis.  -5 prime nucleotidase  -GGT  -Serum vitamin D level    2.  Iron deficiency anemia  -Chronic, in exacerbation.  -Ferrous sulfate 325 mg on Monday Wednesday and Friday.    3.  Bilateral impacted cerumen  -Disimpaction was provided in clinic today.    4.  Need for vaccination  -Prevnar 20 provided in clinic today.  -Tdap provided in clinic today.  -Seasonal influenza and COVID-19 vaccines are provided at his group home.    Return in about 6 months (around 3/14/2023).  For follow-up on #1 and 2 above.    Please note that this dictation was created using voice recognition software. I have made every reasonable attempt to correct obvious errors, but I expect that there are errors of grammar and possibly content that I did not discover before finalizing the note.      Akshat Velasquez PA-C 9/14/2022

## 2022-09-16 LAB — 5NT SERPL-CCNC: 7 U/L (ref 0–15)

## 2022-12-12 ENCOUNTER — DOCUMENTATION (OUTPATIENT)
Dept: HEALTH INFORMATION MANAGEMENT | Facility: OTHER | Age: 37
End: 2022-12-12
Payer: MEDICARE

## 2023-02-02 ENCOUNTER — OFFICE VISIT (OUTPATIENT)
Dept: MEDICAL GROUP | Facility: PHYSICIAN GROUP | Age: 38
End: 2023-02-02
Payer: MEDICARE

## 2023-02-02 VITALS
HEIGHT: 68 IN | OXYGEN SATURATION: 97 % | SYSTOLIC BLOOD PRESSURE: 100 MMHG | RESPIRATION RATE: 16 BRPM | BODY MASS INDEX: 18.49 KG/M2 | DIASTOLIC BLOOD PRESSURE: 58 MMHG | WEIGHT: 122 LBS | TEMPERATURE: 98.7 F | HEART RATE: 76 BPM

## 2023-02-02 DIAGNOSIS — R49.0 DYSPHONIA: ICD-10-CM

## 2023-02-02 PROCEDURE — 99213 OFFICE O/P EST LOW 20 MIN: CPT | Performed by: STUDENT IN AN ORGANIZED HEALTH CARE EDUCATION/TRAINING PROGRAM

## 2023-02-02 ASSESSMENT — PATIENT HEALTH QUESTIONNAIRE - PHQ9: CLINICAL INTERPRETATION OF PHQ2 SCORE: 0

## 2023-02-02 ASSESSMENT — FIBROSIS 4 INDEX: FIB4 SCORE: 0.61

## 2023-02-02 NOTE — PROGRESS NOTES
CC:  The encounter diagnosis was Dysphonia.    HISTORY OF THE PRESENT ILLNESS: Patient is a 38 y.o. male.  He is a resident of a care home and he is present with one of his caregivers today.  This pleasant patient is here today to discuss:    1. Dysphonia  Symptoms have developed over the last few weeks.  Symptoms are worse in the AM.  Symptoms consist of gurgling while breathing with some coughing.  Some baseline voice change.  It has been difficult to gauge the degree of productivity as the patient is not particularly cooperative with this.  He reports no symptoms of heartburn although his ability to provide an accurate history is in question.    Cigarette consumer for many years.      Active Diagnosis:    Patient Active Problem List   Diagnosis    Abdominal pain, epigastric    Abscess, perianal    Anemia, iron deficiency    Attention deficit hyperactivity disorder (ADHD), combined type    Obesity    Mild intellectual disability    Mood disorder (HCC)    Neoplasm of uncertain behavior of skin    Nicotine addiction    Onychogryphosis    Disorder of tooth development    Unspecified contact dermatitis, unspecified cause    Skin disorder    Weight loss    Elevated alkaline phosphatase level      Current Outpatient Medications Ordered in Epic   Medication Sig Dispense Refill    ferrous sulfate 325 (65 Fe) MG tablet Take 1 Tablet by mouth every Monday, Wednesday, and Friday. 90 Tablet 0    hydrOXYzine pamoate (VISTARIL) 50 MG Cap       mirtazapine (REMERON) 15 MG Tab       cloNIDine (CATAPRES) 0.1 MG Tab       Nutritional Supplements (HIGH-PROTEIN NUTRITIONAL SHAKE) Liquid Take 1 Units by mouth every day. 2844 mL 1    NON SPECIFIED Patient may use 1 bottle of protein shake OTC 30 Each 3    ARIPiprazole (ABILIFY) 10 MG Tab Take 1 Tablet by mouth every day. 30 Tablet 0    nicotine (NICOTROL) 10 MG inhaler Inhale 1 Puff as needed for Smoking Cessation. Use 1-4 cartiages per day to taper off smoking 168 Each 0    nicotine  "(NICODERM) 7 MG/24HR PATCH 24 HR Place 1 Patch on the skin every 24 hours. 30 Patch 3    propranolol (INDERAL) 10 MG Tab Take  by mouth 2 times a day.      Valbenazine Tosylate (INGREZZA) 80 MG Cap Take 80 mg by mouth every day. Indications: Tardive Dyskinesia      clonazepam (KLONOPIN) 2 MG tablet Take 2 mg by mouth 2 times a day as needed (Shaking).       No current Epic-ordered facility-administered medications on file.     ROS:   See HPI.    Objective:     Exam: /58 (BP Location: Left arm, Patient Position: Sitting, BP Cuff Size: Adult)   Pulse 76   Temp 37.1 °C (98.7 °F) (Temporal)   Resp 16   Ht 1.727 m (5' 8\")   Wt 55.3 kg (122 lb)   SpO2 97%  Body mass index is 18.55 kg/m².    General: Normal appearing. No distress.  Pulmonary: Clear to ausculation.  Normal effort. No rales, ronchi, or wheezing.  Oral exam: Bilateral +1 tonsils.  No evidence of postnasal drip.  No erythema or mass.  Neck: Trachea is motile.  No lymphadenopathy.  Gurgling breath sounds can be localized at about the level of the glottis by stethoscope.  neurologic: At baseline with tar dive dyskinesia.  Skin: Warm and dry.  No obvious lesions.  Musculoskeletal: No extremity cyanosis, clubbing, or edema.      A chaperone was offered to the patient during today's exam. Patient declined chaperone.      Assessment & Plan:   38 y.o. male with the following -    Labs:   No pertinent labs.    1. Dysphonia  -Undiagnosed problem with uncertain prognosis.-DDx: Esophageal pouch versus gastroesophageal reflux disease versus postnasal drip/allergies versus vocal cord dysfunction or polyp versus malignancy.  -Patient would likely need endoscopy for complete evaluation and diagnosis.  - Referral to ENT    Return if symptoms worsen or fail to improve.    20 minutes were spent on reviewing the patient's chart, interview with the patient and his caregiver, history/physical exam, referral and chart completion.    Please note that this dictation was " created using voice recognition software. I have made every reasonable attempt to correct obvious errors, but I expect that there are errors of grammar and possibly content that I did not discover before finalizing the note.      Akshat Velasquez PA-C 2/2/2023

## 2023-02-16 NOTE — ED NOTES
Lvm for pt to return phone call regarding lab results   Patient resting comfortably on gurney, resp even and unlabored, NAD.  Patient continues on 1:1 observation.

## 2023-02-19 ENCOUNTER — HOSPITAL ENCOUNTER (EMERGENCY)
Facility: MEDICAL CENTER | Age: 38
End: 2023-02-19
Attending: EMERGENCY MEDICINE
Payer: MEDICARE

## 2023-02-19 VITALS
RESPIRATION RATE: 16 BRPM | HEIGHT: 68 IN | WEIGHT: 122 LBS | SYSTOLIC BLOOD PRESSURE: 120 MMHG | DIASTOLIC BLOOD PRESSURE: 82 MMHG | OXYGEN SATURATION: 95 % | HEART RATE: 85 BPM | TEMPERATURE: 97.3 F | BODY MASS INDEX: 18.49 KG/M2

## 2023-02-19 DIAGNOSIS — Z00.00 GENERAL MEDICAL EXAM: ICD-10-CM

## 2023-02-19 PROCEDURE — 99284 EMERGENCY DEPT VISIT MOD MDM: CPT

## 2023-02-19 PROCEDURE — 700102 HCHG RX REV CODE 250 W/ 637 OVERRIDE(OP): Performed by: EMERGENCY MEDICINE

## 2023-02-19 PROCEDURE — A9270 NON-COVERED ITEM OR SERVICE: HCPCS | Performed by: EMERGENCY MEDICINE

## 2023-02-19 RX ORDER — ARIPIPRAZOLE 5 MG/1
5 TABLET ORAL DAILY
Status: SHIPPED | COMMUNITY
End: 2023-11-16

## 2023-02-19 RX ORDER — IBUPROFEN 600 MG/1
600 TABLET ORAL ONCE
Status: COMPLETED | OUTPATIENT
Start: 2023-02-19 | End: 2023-02-19

## 2023-02-19 RX ADMIN — IBUPROFEN 600 MG: 600 TABLET, FILM COATED ORAL at 09:53

## 2023-02-19 ASSESSMENT — FIBROSIS 4 INDEX: FIB4 SCORE: 0.61

## 2023-02-19 NOTE — ED NOTES
Pt discharged home in stable condition. VSS. Follow-up care reviewed with pt's caregiver, all questions answered. Pt ambulatory out of ER with steady gait and all belongings. Safe ride home with caregiver.

## 2023-02-19 NOTE — DISCHARGE PLANNING
Anticipated Discharge Disposition: Home to group home    Action: Updated Valente that Behavioral health assessment per MD is completed and pt is not holdable. Valente aware and will come get pt. Will update Grandmother as well.    Barriers to Discharge: None    Plan: No further needs

## 2023-02-19 NOTE — ED PROVIDER NOTES
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 male with a history of what appears to be mild mental retardation and tardive dyskinesia.  He apparently went missing for a couple of days-a friend [place] him [place].  He normally lives at a group home[facility]".  He has not taken his Ingrezza which he uses for tardive dyskinesia.  He has no current complaints.  He states he has been eating well.  No vomiting or diarrhea.  No abdominal pain chest pain shortness of breath or headache.  No injuries.    PAST MEDICAL HISTORY   has a past medical history of ADHD (attention deficit hyperactivity disorder), Psychiatric problem, and Tardive dyskinesia.    SURGICAL HISTORY   has a past surgical history that includes sintia rectal abscess incision and drainage (N/A, 2/26/2016).    FAMILY HISTORY  Family History   Problem Relation Age of Onset    Bipolar disorder Brother     Other Brother        SOCIAL HISTORY  Social History     Tobacco Use    Smoking status: Every Day     Packs/day: 0.25     Types: Cigarettes     Start date: 2/27/2006    Smokeless tobacco: Current   Vaping Use    Vaping Use: Former   Substance and Sexual Activity    Alcohol use: Yes     Comment: occasionally    Drug use: Not Currently     Types: Inhaled, Oral     Comment: marijuana occasionally    Sexual activity: Not on file       CURRENT MEDICATIONS  Home Medications       Reviewed by Arina Matos R.N. (Registered Nurse) on 02/19/23 at 0853  Med List Status: Partial     Medication Last Dose Status   ARIPiprazole (ABILIFY) 10 MG Tab  Active   clonazepam (KLONOPIN) 2 MG tablet  Active   cloNIDine (CATAPRES) 0.1 MG Tab  Active   ferrous sulfate 325 (65 Fe) MG tablet  " "Active   hydrOXYzine pamoate (VISTARIL) 50 MG Cap  Active   mirtazapine (REMERON) 15 MG Tab  Active   nicotine (NICODERM) 7 MG/24HR PATCH 24 HR  Active   nicotine (NICOTROL) 10 MG inhaler  Active   NON SPECIFIED  Active   Nutritional Supplements (HIGH-PROTEIN NUTRITIONAL SHAKE) Liquid  Active   propranolol (INDERAL) 10 MG Tab  Active   Valbenazine Tosylate (INGREZZA) 80 MG Cap  Active                    ALLERGIES  Allergies   Allergen Reactions    Penicillins        PHYSICAL EXAM  VITAL SIGNS: /87   Pulse 100   Temp 36.3 °C (97.3 °F) (Temporal)   Resp 18   Ht 1.727 m (5' 8\")   Wt 55.3 kg (122 lb)   SpO2 94%   BMI 18.55 kg/m²    Constitutional: Well developed, Well nourished, No acute distress, Non-toxic appearance.   HENT: Normocephalic, Atraumatic, mucous membranes moist, no erythema, exudates, swelling, or masses, nares patent  Eyes: nonicteric  Neck: Supple, no meningismus  Lymphatic: No lymphadenopathy noted.   Cardiovascular: Regular rate and rhythm, no gallops rubs or murmurs  Lungs: Clear bilaterally   Abdomen: Soft and nontender throughout  Skin: Warm, Dry, no rash  Genitalia: Deferred  Rectal: Deferred  Extremities: No edema  Neurologic: Alert, appropriate, follows commands, moving all extremities, normal speech   Psychiatric: Affect normal      DIAGNOSTIC STUDIES / PROCEDURES      RADIOLOGY      COURSE & MEDICAL DECISION MAKING    ED Observation Status? No; Patient does not meet criteria for ED Observation.     INITIAL ASSESSMENT, COURSE AND PLAN  Care Narrative: This is a 38-year-old male here essentially for medical clearance.  Vitals are reassuring other than borderline tachycardia with heart rate of 100 on arrival.  His physical exam is reassuring.  He does not report any specific complaints outside of missing his medication.  He will be discharged back to his group home.    12:32 PM-patient evaluated by life skills who does not feel the patient is holdable and otherwise feels the patient " is safe for discharge back to his group home.  The patient was not trying to run away-he just wanted to go out for a bit.  He recurrently denies suicidal ideation and homicidal ideation.        ADDITIONAL PROBLEM LIST    DISPOSITION AND DISCUSSIONS    Discussion of management with other [facility] or appropriate source(s): None       Barriers to care at this time, including but not limited to: Patient lacks transportation .     Decision tools and prescription drugs considered including, but not limited to:  None other than the patient's Ingrezza .    FINAL DIAGNOSIS  Medical clearance       Electronically signed by: Martin Rucker M.D., 2/19/2023 8:57 AM

## 2023-02-19 NOTE — ED TRIAGE NOTES
Pt arrives via [facility] from [facility] for medical screening. Pt lives at [facility], left two days ago and was reported missing. Pt states his friend took him to [facility]. He now wants to be seen by MD prior to going home because he missed two days of his tardive dyskinesia medication Ingrezza. Pt denies trauma, denies physical complaints. Denies SI/HI. Alert, oriented.

## 2023-02-19 NOTE — ED NOTES
Pt resting in stretcher. Provided applesauce per request. No distress noted. Continue to wait for behavioral health eval. Pt aware.
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
## 2023-02-19 NOTE — DISCHARGE PLANNING
Anticipated Discharge Disposition: Home to Group home    Action: ER  CM spoke with Dario Neff  She is co guardian with mother. Guardianship paperwork is visibile on last admission in chart media tab. Will ask PAR to scan into ACP documents. [facility] address is correct on facesheet. She notes that [facility]  number in previous notes was planning on picking pt up tomorrow ER CM advised cleared. Sent call to speak with MD for concerns re running away/safety/ behavioral health eval. Will need ride home with caregiver above recommended.     Barriers to Discharge: Contact with group home for  if cleared    Plan: Will cont to follow

## 2023-03-13 DIAGNOSIS — D50.8 OTHER IRON DEFICIENCY ANEMIA: ICD-10-CM

## 2023-03-13 RX ORDER — FERROUS SULFATE 325(65) MG
TABLET ORAL
Qty: 12 TABLET | Refills: 0 | Status: SHIPPED | OUTPATIENT
Start: 2023-03-13 | End: 2023-03-13 | Stop reason: SDUPTHER

## 2023-03-13 RX ORDER — FERROUS SULFATE 325(65) MG
TABLET ORAL
Qty: 36 TABLET | Refills: 3 | Status: SHIPPED | OUTPATIENT
Start: 2023-03-13 | End: 2024-02-13 | Stop reason: SDUPTHER

## 2023-03-19 ENCOUNTER — APPOINTMENT (OUTPATIENT)
Dept: RADIOLOGY | Facility: MEDICAL CENTER | Age: 38
End: 2023-03-19
Attending: EMERGENCY MEDICINE
Payer: MEDICARE

## 2023-03-19 ENCOUNTER — HOSPITAL ENCOUNTER (EMERGENCY)
Facility: MEDICAL CENTER | Age: 38
End: 2023-03-19
Attending: EMERGENCY MEDICINE
Payer: MEDICARE

## 2023-03-19 VITALS
BODY MASS INDEX: 21.22 KG/M2 | TEMPERATURE: 98.2 F | HEIGHT: 68 IN | HEART RATE: 87 BPM | OXYGEN SATURATION: 93 % | DIASTOLIC BLOOD PRESSURE: 91 MMHG | WEIGHT: 140 LBS | RESPIRATION RATE: 16 BRPM | SYSTOLIC BLOOD PRESSURE: 129 MMHG

## 2023-03-19 DIAGNOSIS — J44.1 ACUTE EXACERBATION OF CHRONIC OBSTRUCTIVE PULMONARY DISEASE (COPD) (HCC): ICD-10-CM

## 2023-03-19 DIAGNOSIS — R06.00 DYSPNEA, UNSPECIFIED TYPE: ICD-10-CM

## 2023-03-19 LAB — EKG IMPRESSION: NORMAL

## 2023-03-19 PROCEDURE — 71045 X-RAY EXAM CHEST 1 VIEW: CPT

## 2023-03-19 PROCEDURE — 700111 HCHG RX REV CODE 636 W/ 250 OVERRIDE (IP): Performed by: EMERGENCY MEDICINE

## 2023-03-19 PROCEDURE — 93005 ELECTROCARDIOGRAM TRACING: CPT | Performed by: EMERGENCY MEDICINE

## 2023-03-19 PROCEDURE — 93005 ELECTROCARDIOGRAM TRACING: CPT

## 2023-03-19 PROCEDURE — 99284 EMERGENCY DEPT VISIT MOD MDM: CPT

## 2023-03-19 RX ORDER — ALBUTEROL SULFATE 90 UG/1
2 AEROSOL, METERED RESPIRATORY (INHALATION) EVERY 6 HOURS PRN
Qty: 8.5 G | Refills: 0 | Status: SHIPPED | OUTPATIENT
Start: 2023-03-19 | End: 2023-11-16

## 2023-03-19 RX ORDER — PREDNISONE 20 MG/1
60 TABLET ORAL DAILY
Qty: 15 TABLET | Refills: 0 | Status: SHIPPED | OUTPATIENT
Start: 2023-03-19 | End: 2023-03-19 | Stop reason: SDUPTHER

## 2023-03-19 RX ORDER — IPRATROPIUM BROMIDE AND ALBUTEROL SULFATE 2.5; .5 MG/3ML; MG/3ML
3 SOLUTION RESPIRATORY (INHALATION)
Status: DISCONTINUED | OUTPATIENT
Start: 2023-03-19 | End: 2023-03-19

## 2023-03-19 RX ORDER — PREDNISONE 20 MG/1
60 TABLET ORAL ONCE
Status: COMPLETED | OUTPATIENT
Start: 2023-03-19 | End: 2023-03-19

## 2023-03-19 RX ORDER — PREDNISONE 20 MG/1
60 TABLET ORAL DAILY
Qty: 15 TABLET | Refills: 0 | Status: SHIPPED | OUTPATIENT
Start: 2023-03-19 | End: 2023-03-24

## 2023-03-19 RX ADMIN — PREDNISONE 60 MG: 20 TABLET ORAL at 12:58

## 2023-03-19 ASSESSMENT — FIBROSIS 4 INDEX: FIB4 SCORE: 0.61

## 2023-03-19 NOTE — ED TRIAGE NOTES
"Leodan Hyatt  38 y.o.  male  Chief Complaint   Patient presents with    Shortness of Breath     Hx COPD. Called [facility] from [facility] for wheezes & shortness of breath. Given albuterol neb with some relief. No increased WOB noted. C/o clear productive cough for \"a while\".  Lives at group home, hx mild developmental delay.       EKG done on arrival. Placed on monitor. Group home number provided by pt [phone].    "
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
## 2023-03-19 NOTE — ED NOTES
[facility] officer to [facility], dispatched as pt had eloped from group [place] to [facility]. Awaiting [facility]  to  pt.

## 2023-03-19 NOTE — ED NOTES
Patient discharged home per ERP.  Discharge teaching and education discussed with patient. POC discussed.   Patient verbalized understanding of discharge teaching and education. No other questions at this time.     RX for albuterol & prednisone sent to pt's pharmacy.     VSS. Patient alert and oriented. Patient to ride home with group home staff memberValente. Able to ambulate off unit safely with steady gait with group home staff accompanying.

## 2023-03-19 NOTE — ED NOTES
Left a message with pt's group home ([facility]) - number in previous note not active. [facility] # is [phone] per note from visit in Feb by ALERT Team.

## 2023-05-06 ENCOUNTER — HOSPITAL ENCOUNTER (EMERGENCY)
Facility: MEDICAL CENTER | Age: 38
End: 2023-05-06
Attending: EMERGENCY MEDICINE
Payer: MEDICARE

## 2023-05-06 VITALS
BODY MASS INDEX: 20.98 KG/M2 | HEART RATE: 87 BPM | DIASTOLIC BLOOD PRESSURE: 84 MMHG | TEMPERATURE: 98.3 F | WEIGHT: 138 LBS | SYSTOLIC BLOOD PRESSURE: 123 MMHG | OXYGEN SATURATION: 97 % | RESPIRATION RATE: 16 BRPM

## 2023-05-06 DIAGNOSIS — R55 SYNCOPE, UNSPECIFIED SYNCOPE TYPE: ICD-10-CM

## 2023-05-06 DIAGNOSIS — S00.83XA CONTUSION OF FACE, INITIAL ENCOUNTER: Primary | ICD-10-CM

## 2023-05-06 DIAGNOSIS — R73.9 HYPERGLYCEMIA: ICD-10-CM

## 2023-05-06 LAB
ALBUMIN SERPL BCP-MCNC: 3.8 G/DL (ref 3.2–4.9)
ALBUMIN/GLOB SERPL: 1.2 G/DL
ALP SERPL-CCNC: 116 U/L (ref 30–99)
ALT SERPL-CCNC: 11 U/L (ref 2–50)
ANION GAP SERPL CALC-SCNC: 9 MMOL/L (ref 7–16)
AST SERPL-CCNC: 14 U/L (ref 12–45)
BASOPHILS # BLD AUTO: 0.8 % (ref 0–1.8)
BASOPHILS # BLD: 0.08 K/UL (ref 0–0.12)
BILIRUB SERPL-MCNC: 0.3 MG/DL (ref 0.1–1.5)
BUN SERPL-MCNC: 13 MG/DL (ref 8–22)
CALCIUM ALBUM COR SERPL-MCNC: 9.2 MG/DL (ref 8.5–10.5)
CALCIUM SERPL-MCNC: 9 MG/DL (ref 8.5–10.5)
CHLORIDE SERPL-SCNC: 102 MMOL/L (ref 96–112)
CO2 SERPL-SCNC: 27 MMOL/L (ref 20–33)
CREAT SERPL-MCNC: 0.55 MG/DL (ref 0.5–1.4)
EKG IMPRESSION: NORMAL
EOSINOPHIL # BLD AUTO: 0.08 K/UL (ref 0–0.51)
EOSINOPHIL NFR BLD: 0.8 % (ref 0–6.9)
ERYTHROCYTE [DISTWIDTH] IN BLOOD BY AUTOMATED COUNT: 40 FL (ref 35.9–50)
GFR SERPLBLD CREATININE-BSD FMLA CKD-EPI: 130 ML/MIN/1.73 M 2
GLOBULIN SER CALC-MCNC: 3.3 G/DL (ref 1.9–3.5)
GLUCOSE SERPL-MCNC: 207 MG/DL (ref 65–99)
HCT VFR BLD AUTO: 46 % (ref 42–52)
HGB BLD-MCNC: 14.8 G/DL (ref 14–18)
IMM GRANULOCYTES # BLD AUTO: 0.04 K/UL (ref 0–0.11)
IMM GRANULOCYTES NFR BLD AUTO: 0.4 % (ref 0–0.9)
LYMPHOCYTES # BLD AUTO: 1.42 K/UL (ref 1–4.8)
LYMPHOCYTES NFR BLD: 14.2 % (ref 22–41)
MCH RBC QN AUTO: 28.1 PG (ref 27–33)
MCHC RBC AUTO-ENTMCNC: 32.2 G/DL (ref 33.7–35.3)
MCV RBC AUTO: 87.5 FL (ref 81.4–97.8)
MONOCYTES # BLD AUTO: 0.48 K/UL (ref 0–0.85)
MONOCYTES NFR BLD AUTO: 4.8 % (ref 0–13.4)
NEUTROPHILS # BLD AUTO: 7.89 K/UL (ref 1.82–7.42)
NEUTROPHILS NFR BLD: 79 % (ref 44–72)
NRBC # BLD AUTO: 0 K/UL
NRBC BLD-RTO: 0 /100 WBC
PLATELET # BLD AUTO: 221 K/UL (ref 164–446)
PMV BLD AUTO: 10.3 FL (ref 9–12.9)
POTASSIUM SERPL-SCNC: 4 MMOL/L (ref 3.6–5.5)
PROT SERPL-MCNC: 7.1 G/DL (ref 6–8.2)
RBC # BLD AUTO: 5.26 M/UL (ref 4.7–6.1)
SODIUM SERPL-SCNC: 138 MMOL/L (ref 135–145)
TROPONIN T SERPL-MCNC: 7 NG/L (ref 6–19)
WBC # BLD AUTO: 10 K/UL (ref 4.8–10.8)

## 2023-05-06 PROCEDURE — 80053 COMPREHEN METABOLIC PANEL: CPT

## 2023-05-06 PROCEDURE — 99284 EMERGENCY DEPT VISIT MOD MDM: CPT

## 2023-05-06 PROCEDURE — 93005 ELECTROCARDIOGRAM TRACING: CPT | Performed by: EMERGENCY MEDICINE

## 2023-05-06 PROCEDURE — 84484 ASSAY OF TROPONIN QUANT: CPT

## 2023-05-06 PROCEDURE — 36415 COLL VENOUS BLD VENIPUNCTURE: CPT

## 2023-05-06 PROCEDURE — 85025 COMPLETE CBC W/AUTO DIFF WBC: CPT

## 2023-05-06 ASSESSMENT — FIBROSIS 4 INDEX: FIB4 SCORE: 0.61

## 2023-05-06 NOTE — DISCHARGE INSTRUCTIONS
Thankfully today your work-up was negative.  Your blood sugar is very minimally elevated, I do recommend having your PCP check you to make sure you are not developing prediabetes.  No signs of serious injury.  Follow-up with your primary care.  Come back if any worsening symptoms or concerns.  Thank you for coming in today.

## 2023-05-06 NOTE — ED PROVIDER NOTES
ED Provider Note    Scribed for Kody Barnett by Sandeep Rivas. 5/6/2023  12:57 PM    Primary care provider: Akshat Velasquez P.A.-C.  Means of arrival: EMS  History obtained from: Patient  History limited by: None    CHIEF COMPLAINT  Chief Complaint   Patient presents with    Facial Injury     Pt states he fell and hit his nose this morning.  Denies LOC     EXTERNAL RECORDS REVIEWED  Outpatient Notes Seen in office in Feb 23 for dysphonia. Has an intellectual disability, Tardive dyskinesia, lives in group home.    HPI/ROS    LIMITATION TO HISTORY   Select: : None    HPI  Leodan Hyatt is a 38 y.o. male who presents to the Emergency Department for a facial injury onset today. He states that he blacked out after walking and hit his nose. He was dizzy before the incident. He states that he does not know why this happened, but that he has had similar episodes before. These episodes happen about once a year. He denies any drug or alcohol use, but does admit to tobacco use.    The group home that he lives at says that he did not lose consciousness, only that he tripped and hit his nose.     REVIEW OF SYSTEMS  As above, all other systems reviewed and are negative.   See HPI for further details.     PAST MEDICAL HISTORY   has a past medical history of ADHD (attention deficit hyperactivity disorder), Psychiatric problem, and Tardive dyskinesia.    SURGICAL HISTORY   has a past surgical history that includes sintia rectal abscess incision and drainage (N/A, 2/26/2016).    SOCIAL HISTORY  Social History     Tobacco Use    Smoking status: Every Day     Packs/day: 0.25     Types: Cigarettes     Start date: 2/27/2006    Smokeless tobacco: Current   Vaping Use    Vaping Use: Former   Substance Use Topics    Alcohol use: Yes     Comment: occasionally    Drug use: Not Currently     Types: Inhaled, Oral     Comment: marijuana occasionally      Social History     Substance and Sexual Activity   Drug Use Not Currently     Types: Inhaled, Oral    Comment: marijuana occasionally     FAMILY HISTORY  Family History   Problem Relation Age of Onset    Bipolar disorder Brother     Other Brother      CURRENT MEDICATIONS  Current Outpatient Medications   Medication Instructions    albuterol 108 (90 Base) MCG/ACT Aero Soln inhalation aerosol 2 Puffs, Inhalation, EVERY 6 HOURS PRN    ARIPiprazole (ABILIFY) 5 mg, Oral, DAILY    clonazepam (KLONOPIN) 2 mg, Oral, 1 TIME DAILY PRN    cloNIDine (CATAPRES) 0.1 mg, Oral, 2 TIMES DAILY    ferrous sulfate (FEROSUL) 325 (65 Fe) MG tablet TAKE 1 TABLET BY MOUTH EVERY MONDAY, WEDNESDAY AND FRIDAYS    hydrOXYzine pamoate (VISTARIL) 50 mg, Oral, 2 TIMES DAILY    Ingrezza 80 mg, Oral, DAILY    mirtazapine (REMERON) 30 mg, Oral, NIGHTLY    propranolol (INDERAL) 20 mg, Oral, 2 TIMES DAILY      ALLERGIES  Allergies   Allergen Reactions    Penicillins        PHYSICAL EXAM    VITAL SIGNS:   Vitals:    05/06/23 1245 05/06/23 1246   BP:  122/83   Pulse:  83   Resp:  16   Temp:  36.8 °C (98.3 °F)   TempSrc:  Temporal   SpO2:  96%   Weight: 62.6 kg (138 lb)      Vitals: My interpretation: normotensive, not tachycardic, afebrile, not hypoxic    Reinterpretation of vitals: Unremarkable    Cardiac Monitor Interpretation: The cardiac monitor revealed normal Sinus Rhythm  as interpreted by me. The cardiac monitor was ordered secondary to the patient's history of reported syncope and to monitor for dysrhythmia and/or tachycardia.    PE:   Gen: Disheveled, very poor historian. sitting comfortably, speaking clearly, appears in no acute distress   Head: Slight dried blood in nares, no deformity or obvious injury to face or nasal bridge  ENT: Mucous membranes moist, posterior pharynx clear, uvula midline, nares patent bilaterally   Neck: Supple, FROM  Pulmonary: Lungs are clear to auscultation bilaterally. No tachypnea  CV:  RRR, no murmur appreciated, pulses 2+ in both upper and lower extremities  Abdomen: soft, NT/ND; no  rebound/guarding  : no CVA or suprapubic tenderness   Neuro: Intellectual disability, neurologically at baseline. A&Ox4 (person, place, time, situation), speech fluent, gait steady, no focal deficits appreciated  Skin: No rash or lesions.  No pallor or jaundice.  No cyanosis.  Warm and dry.    DIAGNOSTIC STUDIES / PROCEDURES    LABS  Results for orders placed or performed during the hospital encounter of 05/06/23   CBC WITH DIFFERENTIAL   Result Value Ref Range    WBC 10.0 4.8 - 10.8 K/uL    RBC 5.26 4.70 - 6.10 M/uL    Hemoglobin 14.8 14.0 - 18.0 g/dL    Hematocrit 46.0 42.0 - 52.0 %    MCV 87.5 81.4 - 97.8 fL    MCH 28.1 27.0 - 33.0 pg    MCHC 32.2 (L) 33.7 - 35.3 g/dL    RDW 40.0 35.9 - 50.0 fL    Platelet Count 221 164 - 446 K/uL    MPV 10.3 9.0 - 12.9 fL    Neutrophils-Polys 79.00 (H) 44.00 - 72.00 %    Lymphocytes 14.20 (L) 22.00 - 41.00 %    Monocytes 4.80 0.00 - 13.40 %    Eosinophils 0.80 0.00 - 6.90 %    Basophils 0.80 0.00 - 1.80 %    Immature Granulocytes 0.40 0.00 - 0.90 %    Nucleated RBC 0.00 /100 WBC    Neutrophils (Absolute) 7.89 (H) 1.82 - 7.42 K/uL    Lymphs (Absolute) 1.42 1.00 - 4.80 K/uL    Monos (Absolute) 0.48 0.00 - 0.85 K/uL    Eos (Absolute) 0.08 0.00 - 0.51 K/uL    Baso (Absolute) 0.08 0.00 - 0.12 K/uL    Immature Granulocytes (abs) 0.04 0.00 - 0.11 K/uL    NRBC (Absolute) 0.00 K/uL   COMP METABOLIC PANEL   Result Value Ref Range    Sodium 138 135 - 145 mmol/L    Potassium 4.0 3.6 - 5.5 mmol/L    Chloride 102 96 - 112 mmol/L    Co2 27 20 - 33 mmol/L    Anion Gap 9.0 7.0 - 16.0    Glucose 207 (H) 65 - 99 mg/dL    Bun 13 8 - 22 mg/dL    Creatinine 0.55 0.50 - 1.40 mg/dL    Calcium 9.0 8.5 - 10.5 mg/dL    AST(SGOT) 14 12 - 45 U/L    ALT(SGPT) 11 2 - 50 U/L    Alkaline Phosphatase 116 (H) 30 - 99 U/L    Total Bilirubin 0.3 0.1 - 1.5 mg/dL    Albumin 3.8 3.2 - 4.9 g/dL    Total Protein 7.1 6.0 - 8.2 g/dL    Globulin 3.3 1.9 - 3.5 g/dL    A-G Ratio 1.2 g/dL   TROPONIN   Result Value Ref Range     Troponin T 7 6 - 19 ng/L   CORRECTED CALCIUM   Result Value Ref Range    Correct Calcium 9.2 8.5 - 10.5 mg/dL   ESTIMATED GFR   Result Value Ref Range    GFR (CKD-EPI) 130 >60 mL/min/1.73 m 2   EKG (NOW)   Result Value Ref Range    Report       [facility] Emergency Dept.    Test Date:  2023  Pt Name:    RUTHIE LEES               Department: ER  MRN:        [id number]                      Room:        13  Gender:     Male                         Technician: [id number]  :        1985                   Requested By:SALVADOR BARNETT  Order #:    [id number]                    Reading MD: Salvador Barnett    Measurements  Intervals                                Axis  Rate:       78                           P:          63  MD:         126                          QRS:        101  QRSD:       93                           T:          -9  QT:         388  QTc:        442    Interpretive Statements  Sinus rhythm  Right axis deviation  Borderline repolarization abnormality  Compared to ECG 2023 12:36:36  Right-axis deviation now present  ST (T wave) deviation no longer present  Electronically Signed On 2023 13:34:03 PDT by Salvador Barnett        All labs reviewed by me. Labs were compared to prior labs if they were available. Significant for no leukocytosis, no anemia, normal electrolytes, normal renal function, mild hyperglycemia, normal liver enzymes, normal bilirubin, troponin negative    COURSE & MEDICAL DECISION MAKING  Nursing notes, VS, PMSFHx, labs, imaging, EKG reviewed in chart.    ED Observation Status? Yes; I am placing the patient in to an observation status due to a diagnostic uncertainty as well as therapeutic intensity. Patient placed in observation status at 1:08 PM, 2023.     Observation plan is as follows: Monitor for symptom management and diagnostic results     Upon Reevaluation, the patient's condition has: Improved; and will be discharged.    Patient  discharged from ED Observation status at 2:05 PM (Time) 5/6/2023 (Date).     Ddx: vasovagal syncope, fracture, sprain, dislocation    MDM: 12:57 PM Leodan Hyatt is a 38 y.o. male who presented with acute nasal bridge pain.  Patient has intellectual disability and tardive dyskinesia, is a very poor historian and tough to piece together exactly what happened.  EMS states that he tripped and fell striking his nose on a table.  Patient states that he became dizzy weak and passed out striking his head on a table.  Fairly ambiguous.  However will initiate work-up here in the ED for possible syncope.  His vital signs are thankfully unremarkable, he is afebrile.  His exam physically seems to be at baseline he has some very mild scant blood in the nares but no obvious deformity of the nasal bridge and his neurologic exam is unremarkable and he has an atraumatic exam otherwise, no indication for CT imaging at this time.  No nausea or vomiting. All labs reviewed by me. Labs were compared to prior labs if they were available. Significant for no leukocytosis, no anemia, normal electrolytes, normal renal function, mild hyperglycemia, normal liver enzymes, normal bilirubin, troponin negative.  EKG without any abnormality.  Reassuring work-up.  Did discuss that his hyperglycemia will need to be addressed, it is likely because he just ate lunch before coming here but will have his PCP follow-up with this.  On repeat exam he has continued reassuring exam with normal vital signs and is appropriate for discharge home.  Negative by Allen syncope scale.  Patient verbalized understand strict return precautions outpatient follow-up plan and is amenable.    ADDITIONAL PROBLEM LIST AND DISPOSITION    Discussion of management with other QHP or appropriate source(s): None     Escalation of care considered, and ultimately not performed:diagnostic imaging    Barriers to care at this time, including but not limited to:  None    FINAL IMPRESSION  1. Contusion of face, initial encounter Acute   2. Syncope, unspecified syncope type Acute   3. Hyperglycemia Acute      ISandeep (Scribe), am scribing for, and in the presence of, Kody Barnett.    Electronically signed by: Sandeep Rivas (Scribe), 5/6/2023    IKody personally performed the services described in this documentation, as scribed by Sandeep Rivas in my presence, and it is both accurate and complete.    The note accurately reflects work and decisions made by me.  Kody Barnett  5/6/2023  2:09 PM

## 2023-05-06 NOTE — ED TRIAGE NOTES
Chief Complaint   Patient presents with    Facial Injury     Pt states he fell and hit his nose this morning.  Denies LOC

## 2023-05-09 ENCOUNTER — OFFICE VISIT (OUTPATIENT)
Dept: MEDICAL GROUP | Facility: PHYSICIAN GROUP | Age: 38
End: 2023-05-09
Payer: MEDICARE

## 2023-05-09 VITALS
BODY MASS INDEX: 19.76 KG/M2 | SYSTOLIC BLOOD PRESSURE: 108 MMHG | TEMPERATURE: 97.8 F | HEIGHT: 68 IN | RESPIRATION RATE: 16 BRPM | HEART RATE: 96 BPM | DIASTOLIC BLOOD PRESSURE: 58 MMHG | WEIGHT: 130.4 LBS | OXYGEN SATURATION: 98 %

## 2023-05-09 DIAGNOSIS — R05.2 SUBACUTE COUGH: ICD-10-CM

## 2023-05-09 DIAGNOSIS — R49.0 DYSPHONIA: ICD-10-CM

## 2023-05-09 PROCEDURE — 99213 OFFICE O/P EST LOW 20 MIN: CPT | Performed by: STUDENT IN AN ORGANIZED HEALTH CARE EDUCATION/TRAINING PROGRAM

## 2023-05-09 RX ORDER — MIRTAZAPINE 30 MG/1
TABLET, FILM COATED ORAL
COMMUNITY
Start: 2023-04-28

## 2023-05-09 ASSESSMENT — FIBROSIS 4 INDEX: FIB4 SCORE: 0.73

## 2023-05-10 NOTE — PROGRESS NOTES
Subjective:     CC:  Diagnoses of Dysphonia and Subacute cough were pertinent to this visit.    HISTORY OF THE PRESENT ILLNESS: Patient is a 38 y.o. male.  He is present today with one of his caretakers.  This pleasant patient is here today to discuss:    1. Dysphonia  2. Subacute cough  Patient continues to have gurgling and coughing while breathing.  Some baseline voice change.  He was initially seen for this condition on 2/2/2023.  He was referred over to ENT who performed endoscopy and per patient's caregiver found no abnormalities.    He continues to have a chronic wet sounding cough, voice change and gurgling when speaking.    Active Diagnosis:    Patient Active Problem List   Diagnosis    Abdominal pain, epigastric    Abscess, perianal    Anemia, iron deficiency    Attention deficit hyperactivity disorder (ADHD), combined type    Obesity    Mild intellectual disability    Mood disorder (HCC)    Neoplasm of uncertain behavior of skin    Nicotine addiction    Onychogryphosis    Disorder of tooth development    Unspecified contact dermatitis, unspecified cause    Skin disorder    Weight loss    Elevated alkaline phosphatase level      Current Outpatient Medications Ordered in Epic   Medication Sig Dispense Refill    mirtazapine (REMERON) 30 MG Tab tablet       NON SPECIFIED Take 180 mg by mouth every day. Fexofenadine 180mg. daily formulation. OK to substitute 60mg BID. 90 Each 1    albuterol 108 (90 Base) MCG/ACT Aero Soln inhalation aerosol Inhale 2 Puffs every 6 hours as needed for Shortness of Breath. 8.5 g 0    ferrous sulfate (FEROSUL) 325 (65 Fe) MG tablet TAKE 1 TABLET BY MOUTH EVERY MONDAY, WEDNESDAY AND FRIDAYS 36 Tablet 3    ARIPiprazole (ABILIFY) 5 MG tablet Take 5 mg by mouth every day.      hydrOXYzine pamoate (VISTARIL) 50 MG Cap Take 50 mg by mouth 2 times a day.      mirtazapine (REMERON) 30 MG TABLET DISPERSIBLE Take 30 mg by mouth every evening.      cloNIDine (CATAPRES) 0.1 MG Tab Take 0.1 mg  "by mouth 2 times a day.      propranolol (INDERAL) 20 MG Tab Take 20 mg by mouth 2 times a day.      Valbenazine Tosylate (INGREZZA) 80 MG Cap Take 80 mg by mouth every day. Indications: Tardive Dyskinesia      clonazepam (KLONOPIN) 2 MG tablet Take 2 mg by mouth 1 time a day as needed (Shaking).       No current Epic-ordered facility-administered medications on file.     ROS:   See HPI.    Objective:     Exam: /58 (BP Location: Left arm, Patient Position: Sitting, BP Cuff Size: Adult)   Pulse 96   Temp 36.6 °C (97.8 °F) (Temporal)   Resp 16   Ht 1.727 m (5' 8\")   Wt 59.1 kg (130 lb 6.4 oz)   SpO2 98%  Body mass index is 19.83 kg/m².    General: Normal appearing. No distress.  HEENT: Normocephalic. Eyes conjunctiva clear lids without ptosis. Oropharynx is without erythema, edema or exudates.   Neck: Supple without JVD. Thyroid is not enlarged.  Pulmonary: Clear to ausculation.  Normal effort. No rales, ronchi, or wheezing.  Lymph: No cervical or supraclavicular lymph nodes are palpable  Skin: Warm and dry.  No obvious lesions.        Assessment & Plan:   38 y.o. male with the following -    Labs:   5/6/2023:  -CMP showing glucose 207, alk phos 116, otherwise normal.    1. Dysphonia  2. Subacute cough  -This remains an undiagnosed problem.  -Trial with fexofenadine 180 mg extended release preparation daily.  If this does not offer some kind of relief we may consider swallow evaluation.  - DX-CHEST-2 VIEWS; Future    Return in about 1 month (around 6/9/2023).    Please note that this dictation was created using voice recognition software. I have made every reasonable attempt to correct obvious errors, but I expect that there are errors of grammar and possibly content that I did not discover before finalizing the note.      Akshat Velasquez PA-C 5/9/2023  "

## 2023-06-08 ENCOUNTER — DOCUMENTATION (OUTPATIENT)
Dept: HEALTH INFORMATION MANAGEMENT | Facility: OTHER | Age: 38
End: 2023-06-08
Payer: MEDICARE

## 2023-06-08 ENCOUNTER — PATIENT MESSAGE (OUTPATIENT)
Dept: HEALTH INFORMATION MANAGEMENT | Facility: OTHER | Age: 38
End: 2023-06-08

## 2023-06-27 NOTE — ED NOTES
Pt given hot food as per pt's request. Pt tolerating PO without any difficulty. No acute distress. Active chest rise noted. No agitation noted. No behavioral pain indicators. One to one sitter in direct view of patient. Will continue to monitor pt.   Rhofade Counseling: Rhofade is a topical medication which can decrease superficial blood flow where applied. Side effects are uncommon and include stinging, redness and allergic reactions.

## 2023-07-18 ENCOUNTER — OFFICE VISIT (OUTPATIENT)
Dept: MEDICAL GROUP | Facility: PHYSICIAN GROUP | Age: 38
End: 2023-07-18
Payer: MEDICARE

## 2023-07-18 VITALS
WEIGHT: 142.8 LBS | TEMPERATURE: 97.8 F | SYSTOLIC BLOOD PRESSURE: 102 MMHG | BODY MASS INDEX: 21.64 KG/M2 | DIASTOLIC BLOOD PRESSURE: 66 MMHG | HEART RATE: 110 BPM | HEIGHT: 68 IN | OXYGEN SATURATION: 94 %

## 2023-07-18 DIAGNOSIS — Z02.89 ENCOUNTER FOR COMPLETION OF FORM WITH PATIENT: ICD-10-CM

## 2023-07-18 PROCEDURE — 99212 OFFICE O/P EST SF 10 MIN: CPT | Performed by: STUDENT IN AN ORGANIZED HEALTH CARE EDUCATION/TRAINING PROGRAM

## 2023-07-18 PROCEDURE — 3078F DIAST BP <80 MM HG: CPT | Performed by: STUDENT IN AN ORGANIZED HEALTH CARE EDUCATION/TRAINING PROGRAM

## 2023-07-18 PROCEDURE — 3074F SYST BP LT 130 MM HG: CPT | Performed by: STUDENT IN AN ORGANIZED HEALTH CARE EDUCATION/TRAINING PROGRAM

## 2023-07-18 ASSESSMENT — FIBROSIS 4 INDEX: FIB4 SCORE: 0.73

## 2023-07-18 NOTE — PROGRESS NOTES
CC:  The encounter diagnosis was Encounter for completion of form with patient.    HISTORY OF THE PRESENT ILLNESS: Patient is a 38 y.o. male.  He is present today with a caregiver.  This pleasant patient is here today to discuss:    1. Encounter for completion of form with patient  RTC bus forms to allow patient to receive public transportation to appropriate day programs.    Active Diagnosis:    Patient Active Problem List   Diagnosis    Abdominal pain, epigastric    Abscess, perianal    Anemia, iron deficiency    Attention deficit hyperactivity disorder (ADHD), combined type    Obesity    Mild intellectual disability    Mood disorder (HCC)    Neoplasm of uncertain behavior of skin    Nicotine addiction    Onychogryphosis    Disorder of tooth development    Unspecified contact dermatitis, unspecified cause    Skin disorder    Weight loss    Elevated alkaline phosphatase level      Current Outpatient Medications Ordered in Epic   Medication Sig Dispense Refill    mirtazapine (REMERON) 30 MG Tab tablet       NON SPECIFIED Take 180 mg by mouth every day. Fexofenadine 180mg. daily formulation. OK to substitute 60mg BID. 90 Each 1    albuterol 108 (90 Base) MCG/ACT Aero Soln inhalation aerosol Inhale 2 Puffs every 6 hours as needed for Shortness of Breath. 8.5 g 0    ferrous sulfate (FEROSUL) 325 (65 Fe) MG tablet TAKE 1 TABLET BY MOUTH EVERY MONDAY, WEDNESDAY AND FRIDAYS 36 Tablet 3    ARIPiprazole (ABILIFY) 5 MG tablet Take 5 mg by mouth every day.      hydrOXYzine pamoate (VISTARIL) 50 MG Cap Take 50 mg by mouth 2 times a day.      mirtazapine (REMERON) 30 MG TABLET DISPERSIBLE Take 30 mg by mouth every evening.      cloNIDine (CATAPRES) 0.1 MG Tab Take 0.1 mg by mouth 2 times a day.      propranolol (INDERAL) 20 MG Tab Take 20 mg by mouth 2 times a day.      Valbenazine Tosylate (INGREZZA) 80 MG Cap Take 80 mg by mouth every day. Indications: Tardive Dyskinesia      clonazepam (KLONOPIN) 2 MG tablet Take 2 mg by  "mouth 1 time a day as needed (Shaking).       No current Epic-ordered facility-administered medications on file.     ROS:   See HPI.    Objective:     Exam: /66 (BP Location: Left arm, Patient Position: Sitting, BP Cuff Size: Adult)   Pulse (!) 110   Temp 36.6 °C (97.8 °F) (Temporal)   Ht 1.727 m (5' 8\")   Wt 64.8 kg (142 lb 12.8 oz)   SpO2 94%  Body mass index is 21.71 kg/m².    General: Normal appearing. No distress.  neurologic: At baseline, evident TD.  Skin: Warm and dry.  No obvious lesions.  Musculoskeletal: No extremity cyanosis, clubbing, or edema.        Assessment & Plan:   38 y.o. male with the following -    Labs:   No pertinent labs.    1. Encounter for completion of form with patient  -Patient limited in his ability to walk long distances due to TD.  Limited intellectual capacity makes long-distance navigation and normal last use a challenge.  Patient has no behavioral/health concerns that would exclude him from safe use of the RTC by system if basic accommodations could be made.  -Forms completed and returned to the patient today.    Return if symptoms worsen or fail to improve.    20 minutes were consumed in chart review, history and physical, completion of forms, completion of chart.    Please note that this dictation was created using voice recognition software. I have made every reasonable attempt to correct obvious errors, but I expect that there are errors of grammar and possibly content that I did not discover before finalizing the note.      Akshat Velasquez PA-C 7/18/2023  "

## 2023-08-02 ENCOUNTER — DOCUMENTATION (OUTPATIENT)
Dept: HEALTH INFORMATION MANAGEMENT | Facility: OTHER | Age: 38
End: 2023-08-02
Payer: MEDICARE

## 2023-11-06 ENCOUNTER — TELEPHONE (OUTPATIENT)
Dept: HEALTH INFORMATION MANAGEMENT | Facility: OTHER | Age: 38
End: 2023-11-06

## 2023-11-16 ENCOUNTER — HOSPITAL ENCOUNTER (OUTPATIENT)
Dept: LAB | Facility: MEDICAL CENTER | Age: 38
End: 2023-11-16
Attending: INTERNAL MEDICINE
Payer: MEDICARE

## 2023-11-16 ENCOUNTER — OFFICE VISIT (OUTPATIENT)
Dept: MEDICAL GROUP | Facility: PHYSICIAN GROUP | Age: 38
End: 2023-11-16
Payer: MEDICARE

## 2023-11-16 VITALS
SYSTOLIC BLOOD PRESSURE: 112 MMHG | HEIGHT: 68 IN | DIASTOLIC BLOOD PRESSURE: 60 MMHG | TEMPERATURE: 97.8 F | HEART RATE: 66 BPM | OXYGEN SATURATION: 97 % | RESPIRATION RATE: 16 BRPM | BODY MASS INDEX: 23.82 KG/M2 | WEIGHT: 157.2 LBS

## 2023-11-16 DIAGNOSIS — R53.82 CHRONIC FATIGUE: ICD-10-CM

## 2023-11-16 DIAGNOSIS — R73.9 HYPERGLYCEMIA: ICD-10-CM

## 2023-11-16 DIAGNOSIS — D50.9 IRON DEFICIENCY ANEMIA, UNSPECIFIED IRON DEFICIENCY ANEMIA TYPE: ICD-10-CM

## 2023-11-16 DIAGNOSIS — E78.5 DYSLIPIDEMIA: ICD-10-CM

## 2023-11-16 DIAGNOSIS — Z00.00 WELL ADULT EXAM: ICD-10-CM

## 2023-11-16 DIAGNOSIS — E55.9 VITAMIN D DEFICIENCY: ICD-10-CM

## 2023-11-16 DIAGNOSIS — F39 MOOD DISORDER (HCC): Chronic | ICD-10-CM

## 2023-11-16 DIAGNOSIS — B35.1 ONYCHOMYCOSIS: ICD-10-CM

## 2023-11-16 DIAGNOSIS — F70 MILD INTELLECTUAL DISABILITY: Chronic | ICD-10-CM

## 2023-11-16 DIAGNOSIS — F90.2 ATTENTION DEFICIT HYPERACTIVITY DISORDER (ADHD), COMBINED TYPE: Chronic | ICD-10-CM

## 2023-11-16 DIAGNOSIS — Z23 NEED FOR VACCINATION: ICD-10-CM

## 2023-11-16 DIAGNOSIS — G24.01 TARDIVE DYSKINESIA: ICD-10-CM

## 2023-11-16 DIAGNOSIS — F33.0 MILD EPISODE OF RECURRENT MAJOR DEPRESSIVE DISORDER (HCC): ICD-10-CM

## 2023-11-16 LAB
25(OH)D3 SERPL-MCNC: 19 NG/ML (ref 30–100)
ANION GAP SERPL CALC-SCNC: 9 MMOL/L (ref 7–16)
BUN SERPL-MCNC: 12 MG/DL (ref 8–22)
CALCIUM SERPL-MCNC: 8.9 MG/DL (ref 8.5–10.5)
CHLORIDE SERPL-SCNC: 106 MMOL/L (ref 96–112)
CHOLEST SERPL-MCNC: 108 MG/DL (ref 100–199)
CO2 SERPL-SCNC: 25 MMOL/L (ref 20–33)
CREAT SERPL-MCNC: 0.45 MG/DL (ref 0.5–1.4)
ERYTHROCYTE [DISTWIDTH] IN BLOOD BY AUTOMATED COUNT: 41.8 FL (ref 35.9–50)
EST. AVERAGE GLUCOSE BLD GHB EST-MCNC: 100 MG/DL
FASTING STATUS PATIENT QL REPORTED: NORMAL
GFR SERPLBLD CREATININE-BSD FMLA CKD-EPI: 138 ML/MIN/1.73 M 2
GLUCOSE SERPL-MCNC: 83 MG/DL (ref 65–99)
HBA1C MFR BLD: 5.1 % (ref 4–5.6)
HCT VFR BLD AUTO: 43.8 % (ref 42–52)
HDLC SERPL-MCNC: 46 MG/DL
HGB BLD-MCNC: 13.3 G/DL (ref 14–18)
IRON SERPL-MCNC: 70 UG/DL (ref 50–180)
LDLC SERPL CALC-MCNC: 54 MG/DL
MCH RBC QN AUTO: 26.8 PG (ref 27–33)
MCHC RBC AUTO-ENTMCNC: 30.4 G/DL (ref 32.3–36.5)
MCV RBC AUTO: 88.1 FL (ref 81.4–97.8)
PLATELET # BLD AUTO: 216 K/UL (ref 164–446)
PMV BLD AUTO: 11.9 FL (ref 9–12.9)
POTASSIUM SERPL-SCNC: 4.4 MMOL/L (ref 3.6–5.5)
RBC # BLD AUTO: 4.97 M/UL (ref 4.7–6.1)
SODIUM SERPL-SCNC: 140 MMOL/L (ref 135–145)
TRIGL SERPL-MCNC: 39 MG/DL (ref 0–149)
TSH SERPL DL<=0.005 MIU/L-ACNC: 2.24 UIU/ML (ref 0.38–5.33)
WBC # BLD AUTO: 7.7 K/UL (ref 4.8–10.8)

## 2023-11-16 PROCEDURE — 82306 VITAMIN D 25 HYDROXY: CPT

## 2023-11-16 PROCEDURE — 36415 COLL VENOUS BLD VENIPUNCTURE: CPT

## 2023-11-16 PROCEDURE — 85027 COMPLETE CBC AUTOMATED: CPT

## 2023-11-16 PROCEDURE — 99215 OFFICE O/P EST HI 40 MIN: CPT | Mod: 25 | Performed by: INTERNAL MEDICINE

## 2023-11-16 PROCEDURE — 84443 ASSAY THYROID STIM HORMONE: CPT

## 2023-11-16 PROCEDURE — 83036 HEMOGLOBIN GLYCOSYLATED A1C: CPT

## 2023-11-16 PROCEDURE — 3078F DIAST BP <80 MM HG: CPT | Performed by: INTERNAL MEDICINE

## 2023-11-16 PROCEDURE — 80048 BASIC METABOLIC PNL TOTAL CA: CPT

## 2023-11-16 PROCEDURE — G0008 ADMIN INFLUENZA VIRUS VAC: HCPCS | Performed by: INTERNAL MEDICINE

## 2023-11-16 PROCEDURE — 83540 ASSAY OF IRON: CPT

## 2023-11-16 PROCEDURE — 90686 IIV4 VACC NO PRSV 0.5 ML IM: CPT | Performed by: INTERNAL MEDICINE

## 2023-11-16 PROCEDURE — 80061 LIPID PANEL: CPT

## 2023-11-16 PROCEDURE — 3074F SYST BP LT 130 MM HG: CPT | Performed by: INTERNAL MEDICINE

## 2023-11-16 RX ORDER — VALBENAZINE 80 MG/1
80 CAPSULE ORAL
COMMUNITY

## 2023-11-16 ASSESSMENT — FIBROSIS 4 INDEX: FIB4 SCORE: 0.73

## 2023-11-16 NOTE — ASSESSMENT & PLAN NOTE
Chronic condition.  The patient no longer take Ritalin.  Patient followed by private psychiatry service on regular basis

## 2023-11-16 NOTE — ASSESSMENT & PLAN NOTE
Chronic condition.  Symptoms noted approximately 6 weeks after his COVID shot.  Patient and caregiver reported that he has lost over 100 pounds.  Patient currently followed by private psychiatrist.  Patient is being treated with Ingrezza 80 Mg daily.  Patient reported that his symptoms are controlled at this time

## 2023-11-16 NOTE — ASSESSMENT & PLAN NOTE
Chronic condition.  Followed by private psychiatrist.  His symptoms are fairly well controlled.  Patient takes hydroxyzine 50 Mg twice daily.

## 2023-11-16 NOTE — PROGRESS NOTES
"PRIMARY CARE CLINIC VISIT        Chief Complaint   Patient presents with    Establish Care      New patient here to establish care  ADHD  Chronic major depression  Mood disorder  Anemia  Onychomycosis  Intellectual disability  Tardive dyskinesia  Hypoglycemia  Vitamin D deficiency  Request lab test  Request influenza vaccine          History of Present Illness     Attention deficit hyperactivity disorder (ADHD), combined type  Chronic condition.  The patient no longer take Ritalin.  Patient followed by private psychiatry service on regular basis    Mild episode of recurrent major depressive disorder (HCC)  This is a chronic condition.  Patient followed by private psychiatrist.  Patient is presently taking mirtazapine Remeron 30 Mg daily.    Mood disorder (HCC)  Chronic condition.  Followed by private psychiatrist.  His symptoms are fairly well controlled.  Patient takes hydroxyzine 50 Mg twice daily.    Anemia, iron deficiency  Chronic condition.  Patient has been taking ferrous sulfate 3 times a week.  Patient denies history of bleeding.  Lab test ordered for follow-up.    Mild intellectual disability  Chronic condition.  The patient used to live in a group home previously.  Patient presently being cared for by his brother.  The patient can goes to [facility] facility [facility]  [facility] through [facility].  Patient also has a personal caregiver.    Tardive dyskinesia  Chronic condition.  Symptoms noted approximately 6 weeks after his COVID shot.  Patient and caregiver reported that he has lost over 100 pounds.  Patient currently followed by private psychiatrist.  Patient is being treated with Ingrezza 80 Mg daily.  Patient reported that his symptoms are controlled at this time    Onychomycosis  Chronic condition affecting the toenails of the right foot.  The patient has difficulty trimming his nail.  The patient requested referral to podiatry service    Need for vaccination  Patient is due for influenza " vaccine.    Hyperglycemia  Chronic condition.  Noted with chart review.  Patient unaware of the diagnosis.  Today I recommend fasting lab test including A1c to be done for follow-up.    Current Outpatient Medications on File Prior to Visit   Medication Sig Dispense Refill    Valbenazine Tosylate (INGREZZA) 80 MG Cap Take 80 mg by mouth.      mirtazapine (REMERON) 30 MG Tab tablet       ferrous sulfate (FEROSUL) 325 (65 Fe) MG tablet TAKE 1 TABLET BY MOUTH EVERY MONDAY, WEDNESDAY AND FRIDAYS 36 Tablet 3    hydrOXYzine pamoate (VISTARIL) 50 MG Cap Take 50 mg by mouth 2 times a day.      cloNIDine (CATAPRES) 0.1 MG Tab Take 0.1 mg by mouth 2 times a day.      propranolol (INDERAL) 20 MG Tab Take 20 mg by mouth 2 times a day.       No current facility-administered medications on file prior to visit.        Allergies: Penicillins    Current Outpatient Medications Ordered in Epic   Medication Sig Dispense Refill    Valbenazine Tosylate (INGREZZA) 80 MG Cap Take 80 mg by mouth.      mirtazapine (REMERON) 30 MG Tab tablet       ferrous sulfate (FEROSUL) 325 (65 Fe) MG tablet TAKE 1 TABLET BY MOUTH EVERY MONDAY, WEDNESDAY AND FRIDAYS 36 Tablet 3    hydrOXYzine pamoate (VISTARIL) 50 MG Cap Take 50 mg by mouth 2 times a day.      cloNIDine (CATAPRES) 0.1 MG Tab Take 0.1 mg by mouth 2 times a day.      propranolol (INDERAL) 20 MG Tab Take 20 mg by mouth 2 times a day.       No current [facility]-ordered facility-administered medications on file.       Past Medical History:   Diagnosis Date    ADHD (attention deficit hyperactivity disorder)     Psychiatric problem     Mild Mental Retardation    Tardive dyskinesia        Past Surgical History:   Procedure Laterality Date    BLAIR RECTAL ABSCESS INCISION AND DRAINAGE N/A 2/26/2016    Procedure: BLAIR RECTAL ABSCESS INCISION AND DRAINAGE;  Surgeon: Shanna Lindo M.D.;  Location: SURGERY [facility];  Service:        Family History   Problem Relation Age of Onset    Bipolar  "disorder Brother     Other Brother        Social History     Tobacco Use   Smoking Status Former    Current packs/day: 0.25    Average packs/day: 0.3 packs/day for 17.7 years (4.4 ttl pk-yrs)    Types: Cigarettes    Start date: 2/27/2006   Smokeless Tobacco Never       Social History     Substance and Sexual Activity   Alcohol Use Not Currently    Comment: occasionally       Review of systems.  As per HPI above. All other systems reviewed and negative.      Past Medical, Social, and Family history reviewed and updated in EPIC     Objective     /60 (BP Location: Right arm, Patient Position: Sitting, BP Cuff Size: Small adult)   Pulse 66   Temp 36.6 °C (97.8 °F) (Temporal)   Resp 16   Ht 1.727 m (5' 8\")   Wt 71.3 kg (157 lb 3.2 oz)   SpO2 97%    Body mass index is 23.9 kg/m².    General: alert in no apparent distress.  Patient with involuntary muscle movements especially in the face trunk and upper extremities  Cardiovascular: regular rate and rhythm  Pulmonary: lungs : no wheezing   Gastrointestinal: BS present.   Right foot with long mycotic nails.  No evidence of soft tissue infection noted      Lab Results   Component Value Date/Time    HBA1C 5.4 03/27/2018 08:04 AM    HBA1C 5.3 08/03/2015 07:53 AM    HBA1C 5.6 05/21/2014 08:44 AM       Lab Results   Component Value Date/Time    WBC 10.0 05/06/2023 01:15 PM    HEMOGLOBIN 14.8 05/06/2023 01:15 PM    HEMATOCRIT 46.0 05/06/2023 01:15 PM    MCV 87.5 05/06/2023 01:15 PM    PLATELETCT 221 05/06/2023 01:15 PM         Lab Results   Component Value Date/Time    SODIUM 138 05/06/2023 01:15 PM    POTASSIUM 4.0 05/06/2023 01:15 PM    GLUCOSE 207 (H) 05/06/2023 01:15 PM    BUN 13 05/06/2023 01:15 PM    CREATININE 0.55 05/06/2023 01:15 PM       Lab Results   Component Value Date/Time    CHOLSTRLTOT 120 03/27/2018 08:04 AM    TRIGLYCERIDE 66 03/27/2018 08:04 AM    HDL 34 (A) 03/27/2018 08:04 AM    LDL 73 03/27/2018 08:04 AM       Lab Results   Component Value " Date/Time    ALTSGPT 11 05/06/2023 01:15 PM             Assessment and Plan     1. Mild episode of recurrent major depressive disorder (HCC)  Chronic stable condition.  Continue with mirtazapine 30 Mg daily.  Continue follow-up with psychiatry service    2. Attention deficit hyperactivity disorder (ADHD), combined type  Chronic condition.  Stable.  Patient currently not on therapy.  Continue follow-up with psychiatry service.    3. Mood disorder (HCC)  Chronic stable condition.  Continue hydroxyzine 50 Mg twice daily.  Continue follow-up with psychiatry service    4. Onychomycosis  Chronic condition.  Uncontrolled.  - Referral to Podiatry for nail trimming.    5. Mild intellectual disability  Chronic stable condition.  Continue to go to group activities 5 days a week.    6. Tardive dyskinesia  Chronic stable condition.  Continue Ingrezza 80 Mg daily.  Continue follow-up with private psychiatrist.    7. Hyperglycemia  - Basic Metabolic Panel; Future  - HEMOGLOBIN A1C; Future  Chronic condition.  Current status unclear.  Lab test ordered for follow-up.    8. Iron deficiency anemia, unspecified iron deficiency anemia type  - CBC WITHOUT DIFFERENTIAL; Future  - IRON; Future  Chronic condition.  Current status unclear.  Lab test requested for follow-up.  Continue with ferrous sulfate 3 times a week    9. Vitamin D deficiency  - VITAMIN D,25 HYDROXY (DEFICIENCY); Future  Chronic stable condition.  Continue with vitamin D supplementation.    10. Need for vaccination  - INFLUENZA VACCINE QUAD INJ (PF)    Attestation: I spent:  62   min -  That includes time for chart review before the visit, the actual patient visit, and time spent on documentation in EMR after the visit.  Chart review/prep, review of other providers' records, imaging/lab review, face-to-face time for history/examination, pt's counseling/education, ordering, prescribing,  review of results/meds/ treatment plan with patient, and care coordination.    The  patient is of extensive complexity. This pt is at high risk for complications and morbidity.                     Please note that this dictation was created using voice recognition software. I have made every reasonable attempt to correct obvious errors, but I expect that there are errors of grammar and possibly content that I did not discover before finalizing the note.    Mk Yanez MD  Internal Medicine  [facility]   Hand laceration

## 2023-11-16 NOTE — ASSESSMENT & PLAN NOTE
Chronic condition.  Patient has been taking ferrous sulfate 3 times a week.  Patient denies history of bleeding.  Lab test ordered for follow-up.

## 2023-11-16 NOTE — ASSESSMENT & PLAN NOTE
Chronic condition affecting the toenails of the right foot.  The patient has difficulty trimming his nail.  The patient requested referral to podiatry service   Jackson Pruett(Attending)

## 2023-11-16 NOTE — ASSESSMENT & PLAN NOTE
Chronic condition.  Noted with chart review.  Patient unaware of the diagnosis.  Today I recommend fasting lab test including A1c to be done for follow-up.

## 2023-11-16 NOTE — ASSESSMENT & PLAN NOTE
This is a chronic condition.  Patient followed by private psychiatrist.  Patient is presently taking mirtazapine Remeron 30 Mg daily.

## 2023-11-16 NOTE — ASSESSMENT & PLAN NOTE
"Chronic condition.  The patient used to live in a group home previously.  Patient presently being cared for by his brother.  The patient can goes to the group facility [facility]  [facility] day through [facility].  Patient also has a personal caregiver.  "

## 2023-11-17 DIAGNOSIS — D50.9 IRON DEFICIENCY ANEMIA, UNSPECIFIED IRON DEFICIENCY ANEMIA TYPE: ICD-10-CM

## 2023-11-17 DIAGNOSIS — E55.9 VITAMIN D DEFICIENCY: ICD-10-CM

## 2023-11-17 DIAGNOSIS — D50.8 OTHER IRON DEFICIENCY ANEMIA: Chronic | ICD-10-CM

## 2023-11-17 PROBLEM — D64.9 ANEMIA: Chronic | Status: ACTIVE | Noted: 2023-11-17

## 2023-11-17 PROBLEM — D64.9 ANEMIA: Status: ACTIVE | Noted: 2023-11-17

## 2023-11-24 NOTE — ED NOTES
Pt complaining of lower back pain. Dr. Blancas notified, awaiting new orders.    Continue home meds   - Xeralto 20mg   - Amiodarone 200mg

## 2024-01-04 ENCOUNTER — HOSPITAL ENCOUNTER (OUTPATIENT)
Dept: LAB | Facility: MEDICAL CENTER | Age: 39
End: 2024-01-04
Attending: INTERNAL MEDICINE
Payer: MEDICARE

## 2024-01-04 DIAGNOSIS — D50.9 IRON DEFICIENCY ANEMIA, UNSPECIFIED IRON DEFICIENCY ANEMIA TYPE: ICD-10-CM

## 2024-01-04 LAB
FOLATE SERPL-MCNC: 15.6 NG/ML
VIT B12 SERPL-MCNC: 654 PG/ML (ref 211–911)

## 2024-01-04 PROCEDURE — 36415 COLL VENOUS BLD VENIPUNCTURE: CPT

## 2024-01-04 PROCEDURE — 82746 ASSAY OF FOLIC ACID SERUM: CPT

## 2024-01-04 PROCEDURE — 82607 VITAMIN B-12: CPT

## 2024-01-10 ENCOUNTER — HOSPITAL ENCOUNTER (OUTPATIENT)
Facility: MEDICAL CENTER | Age: 39
End: 2024-01-10
Attending: INTERNAL MEDICINE
Payer: MEDICARE

## 2024-01-10 DIAGNOSIS — D50.9 IRON DEFICIENCY ANEMIA, UNSPECIFIED IRON DEFICIENCY ANEMIA TYPE: ICD-10-CM

## 2024-01-10 LAB — HEMOCCULT STL QL: NEGATIVE

## 2024-01-10 PROCEDURE — 82272 OCCULT BLD FECES 1-3 TESTS: CPT

## 2024-02-13 DIAGNOSIS — D50.8 OTHER IRON DEFICIENCY ANEMIA: ICD-10-CM

## 2024-02-13 RX ORDER — FERROUS SULFATE 325(65) MG
TABLET ORAL
Qty: 36 TABLET | Refills: 3 | Status: SHIPPED | OUTPATIENT
Start: 2024-02-13

## 2024-02-14 NOTE — TELEPHONE ENCOUNTER
Received request via: Pharmacy    Was the patient seen in the last year in this department? Yes    Does the patient have an active prescription (recently filled or refills available) for medication(s) requested? No    Pharmacy Name: [facility]    Does the patient have [facility] and need 100 day supply (blood pressure, diabetes and cholesterol meds only)? Medication is not for cholesterol, blood pressure or diabetes

## 2024-03-07 DIAGNOSIS — D50.9 IRON DEFICIENCY ANEMIA, UNSPECIFIED IRON DEFICIENCY ANEMIA TYPE: Chronic | ICD-10-CM

## 2024-03-20 ENCOUNTER — HOSPITAL ENCOUNTER (EMERGENCY)
Facility: MEDICAL CENTER | Age: 39
End: 2024-03-20
Attending: EMERGENCY MEDICINE
Payer: MEDICARE

## 2024-03-20 ENCOUNTER — APPOINTMENT (OUTPATIENT)
Dept: RADIOLOGY | Facility: MEDICAL CENTER | Age: 39
End: 2024-03-20
Attending: EMERGENCY MEDICINE
Payer: MEDICARE

## 2024-03-20 VITALS
RESPIRATION RATE: 18 BRPM | OXYGEN SATURATION: 93 % | HEIGHT: 68 IN | SYSTOLIC BLOOD PRESSURE: 115 MMHG | WEIGHT: 157 LBS | HEART RATE: 83 BPM | DIASTOLIC BLOOD PRESSURE: 82 MMHG | TEMPERATURE: 97.5 F | BODY MASS INDEX: 23.79 KG/M2

## 2024-03-20 DIAGNOSIS — R10.84 GENERALIZED ABDOMINAL PAIN: ICD-10-CM

## 2024-03-20 LAB
ALBUMIN SERPL BCP-MCNC: 4.5 G/DL (ref 3.2–4.9)
ALBUMIN/GLOB SERPL: 1.5 G/DL
ALP SERPL-CCNC: 127 U/L (ref 30–99)
ALT SERPL-CCNC: 26 U/L (ref 2–50)
ANION GAP SERPL CALC-SCNC: 11 MMOL/L (ref 7–16)
APPEARANCE UR: CLEAR
AST SERPL-CCNC: 28 U/L (ref 12–45)
BASOPHILS # BLD AUTO: 0.8 % (ref 0–1.8)
BASOPHILS # BLD: 0.06 K/UL (ref 0–0.12)
BILIRUB SERPL-MCNC: 0.6 MG/DL (ref 0.1–1.5)
BILIRUB UR QL STRIP.AUTO: NEGATIVE
BUN SERPL-MCNC: 18 MG/DL (ref 8–22)
CALCIUM ALBUM COR SERPL-MCNC: 8.9 MG/DL (ref 8.5–10.5)
CALCIUM SERPL-MCNC: 9.3 MG/DL (ref 8.5–10.5)
CHLORIDE SERPL-SCNC: 108 MMOL/L (ref 96–112)
CO2 SERPL-SCNC: 23 MMOL/L (ref 20–33)
COLOR UR: YELLOW
CREAT SERPL-MCNC: 0.58 MG/DL (ref 0.5–1.4)
EOSINOPHIL # BLD AUTO: 0.07 K/UL (ref 0–0.51)
EOSINOPHIL NFR BLD: 0.9 % (ref 0–6.9)
ERYTHROCYTE [DISTWIDTH] IN BLOOD BY AUTOMATED COUNT: 38 FL (ref 35.9–50)
GFR SERPLBLD CREATININE-BSD FMLA CKD-EPI: 127 ML/MIN/1.73 M 2
GLOBULIN SER CALC-MCNC: 3.1 G/DL (ref 1.9–3.5)
GLUCOSE SERPL-MCNC: 77 MG/DL (ref 65–99)
GLUCOSE UR STRIP.AUTO-MCNC: NEGATIVE MG/DL
HCT VFR BLD AUTO: 45.4 % (ref 42–52)
HGB BLD-MCNC: 15.8 G/DL (ref 14–18)
IMM GRANULOCYTES # BLD AUTO: 0.02 K/UL (ref 0–0.11)
IMM GRANULOCYTES NFR BLD AUTO: 0.3 % (ref 0–0.9)
KETONES UR STRIP.AUTO-MCNC: NEGATIVE MG/DL
LEUKOCYTE ESTERASE UR QL STRIP.AUTO: NEGATIVE
LIPASE SERPL-CCNC: 27 U/L (ref 11–82)
LYMPHOCYTES # BLD AUTO: 1.81 K/UL (ref 1–4.8)
LYMPHOCYTES NFR BLD: 23.5 % (ref 22–41)
MCH RBC QN AUTO: 28.6 PG (ref 27–33)
MCHC RBC AUTO-ENTMCNC: 34.8 G/DL (ref 32.3–36.5)
MCV RBC AUTO: 82.2 FL (ref 81.4–97.8)
MICRO URNS: NORMAL
MONOCYTES # BLD AUTO: 0.68 K/UL (ref 0–0.85)
MONOCYTES NFR BLD AUTO: 8.8 % (ref 0–13.4)
NEUTROPHILS # BLD AUTO: 5.05 K/UL (ref 1.82–7.42)
NEUTROPHILS NFR BLD: 65.7 % (ref 44–72)
NITRITE UR QL STRIP.AUTO: NEGATIVE
NRBC # BLD AUTO: 0 K/UL
NRBC BLD-RTO: 0 /100 WBC (ref 0–0.2)
PH UR STRIP.AUTO: 7.5 [PH] (ref 5–8)
PLATELET # BLD AUTO: 241 K/UL (ref 164–446)
PMV BLD AUTO: 10.6 FL (ref 9–12.9)
POTASSIUM SERPL-SCNC: 3.9 MMOL/L (ref 3.6–5.5)
PROT SERPL-MCNC: 7.6 G/DL (ref 6–8.2)
PROT UR QL STRIP: NEGATIVE MG/DL
RBC # BLD AUTO: 5.52 M/UL (ref 4.7–6.1)
RBC UR QL AUTO: NEGATIVE
SODIUM SERPL-SCNC: 142 MMOL/L (ref 135–145)
SP GR UR STRIP.AUTO: 1.02
UROBILINOGEN UR STRIP.AUTO-MCNC: 0.2 MG/DL
WBC # BLD AUTO: 7.7 K/UL (ref 4.8–10.8)

## 2024-03-20 PROCEDURE — 85025 COMPLETE CBC W/AUTO DIFF WBC: CPT

## 2024-03-20 PROCEDURE — 36415 COLL VENOUS BLD VENIPUNCTURE: CPT

## 2024-03-20 PROCEDURE — 80053 COMPREHEN METABOLIC PANEL: CPT

## 2024-03-20 PROCEDURE — 81003 URINALYSIS AUTO W/O SCOPE: CPT

## 2024-03-20 PROCEDURE — 71045 X-RAY EXAM CHEST 1 VIEW: CPT

## 2024-03-20 PROCEDURE — 99284 EMERGENCY DEPT VISIT MOD MDM: CPT | Mod: 25

## 2024-03-20 PROCEDURE — 83690 ASSAY OF LIPASE: CPT

## 2024-03-20 ASSESSMENT — FIBROSIS 4 INDEX: FIB4 SCORE: 0.76

## 2024-03-20 NOTE — ED NOTES
Pt has discharge orders. Pt educated on discharge instructions.  Pt verbalizes understanding.  PIV removed. Pt ambulatory to lobby with steady gait. Pt going home with nery set up by JOSEPH.  Pt walked out to  with this RN.   name and destination address confirmed with .

## 2024-03-20 NOTE — ED NOTES
"Chief Complaint   Patient presents with    Abdominal Pain     BIBA. Pt was riding on he bus when he started to experience RUQ pain 30min prior to arrival.  Pt denies nausea or recent fever.  Pt has mental delay at baseline per EMS. Pt calm and cooperative at this time.      /78   Pulse (!) 59   Temp 36.3 °C (97.3 °F) (Temporal)   Resp 18   Ht 1.727 m (5' 8\")   Wt 71.2 kg (157 lb)   SpO2 90%   BMI 23.87 kg/m²     Pt's grandma at bedside briefly to give permission to treat pt.   "

## 2024-03-20 NOTE — DISCHARGE INSTRUCTIONS
Return to the emergency department if your abdominal pain returns or for any other medical problems or complaints.  Follow-up with your doctor.  Continue home medications.

## 2024-03-20 NOTE — ED NOTES
PIV inserted. Blood drawn and sent to lab.  Pt also able to ambulate to the bathroom for urine sample.

## 2024-03-20 NOTE — ED PROVIDER NOTES
ED Provider Note    CHIEF COMPLAINT  Chief Complaint   Patient presents with    Abdominal Pain     BIBA. Pt was riding on he bus when he started to experience RUQ pain 30min prior to arrival.  Pt denies nausea or recent fever.  Pt has mental delay at baseline per EMS. Pt calm and cooperative at this time.        EXTERNAL RECORDS REVIEWED  Reviewed previous ED visits for baseline labs and previous workup for comparison    HPI/ROS  LIMITATION TO HISTORY   Patient has baseline cognitive impairment which is moderate to slightly more severe.  Patient is really unable to articulate history.  OUTSIDE HISTORIAN(S):  None    Leodan Hyatt is a 39 y.o. male who presents to the emergency department complaining of right upper quadrant abdominal pain and right-sided lower chest wall pain.  This started 30 minutes prior to arrival.  The patient has fairly significant cognitive impairment at baseline with ADHD underlying psychiatric diagnosis and tardive dyskinesia.  Is really unable to sit still for examination or vital signs.  The patient does states his pain is improving.  He will to localize and point to the area.  Denies any other symptoms.  No nausea, no vomiting, no fevers or chills.  Nothing makes it better or worse.    PAST MEDICAL HISTORY   has a past medical history of ADHD (attention deficit hyperactivity disorder), Psychiatric problem, and Tardive dyskinesia.    SURGICAL HISTORY   has a past surgical history that includes sintia rectal abscess incision and drainage (N/A, 2/26/2016).    FAMILY HISTORY  Family History   Problem Relation Age of Onset    Bipolar disorder Brother     Other Brother        SOCIAL HISTORY  Social History     Tobacco Use    Smoking status: Former     Current packs/day: 0.25     Average packs/day: 0.3 packs/day for 18.1 years (4.5 ttl pk-yrs)     Types: Cigarettes     Start date: 2/27/2006    Smokeless tobacco: Never   Vaping Use    Vaping Use: Former   Substance and Sexual Activity     "Alcohol use: Not Currently     Comment: occasionally    Drug use: Not Currently     Types: Inhaled, Oral     Comment: marijuana occasionally    Sexual activity: Not on file       CURRENT MEDICATIONS  Home Medications       Reviewed by Mya Chadwick R.N. (Registered Nurse) on 03/20/24 at 0918  Med List Status: Not Addressed     Medication Last Dose Status   cloNIDine (CATAPRES) 0.1 MG Tab  Active   ferrous sulfate (FEROSUL) 325 (65 Fe) MG tablet  Active   hydrOXYzine pamoate (VISTARIL) 50 MG Cap  Active   mirtazapine (REMERON) 30 MG Tab tablet  Active   propranolol (INDERAL) 20 MG Tab  Active   Valbenazine Tosylate (INGREZZA) 80 MG Cap  Active                    ALLERGIES  Allergies   Allergen Reactions    Penicillins        PHYSICAL EXAM  VITAL SIGNS: Pulse (!) 59   Resp 18   Ht 1.727 m (5' 8\")   Wt 71.2 kg (157 lb)   SpO2 90%   BMI 23.87 kg/m²    Constitutional: Awake alert writhing and wants to still for examination.  Is difficult time articulating his symptoms.  HENT: Normocephalic, Atraumatic, Bilateral external ears normal, Oropharynx moist  Eyes: PERRL, EOMI, Conjunctiva normal, No discharge.   Neck: Normal range of motion, No tenderness, Supple, No stridor.   Cardiovascular: Normal heart rate, Normal rhythm, No murmurs, No rubs, No gallops.   Thorax & Lungs: Normal breath sounds, No respiratory distress, No wheezing, no chest wall tenderness.  Abdomen: soft, No tenderness  Skin: Warm, Dry, No erythema, No rash.   Back: No tenderness, No CVA tenderness.   Musculoskeletal: Good range of motion in all major joints.  No asymmetric edema good pulses.  Neurologic: Alert, No focal deficits noted.       DIAGNOSTIC STUDIES / PROCEDURES    Results for orders placed or performed during the hospital encounter of 03/20/24   CBC WITH DIFFERENTIAL   Result Value Ref Range    WBC 7.7 4.8 - 10.8 K/uL    RBC 5.52 4.70 - 6.10 M/uL    Hemoglobin 15.8 14.0 - 18.0 g/dL    Hematocrit 45.4 42.0 - 52.0 %    MCV 82.2 81.4 - " 97.8 fL    MCH 28.6 27.0 - 33.0 pg    MCHC 34.8 32.3 - 36.5 g/dL    RDW 38.0 35.9 - 50.0 fL    Platelet Count 241 164 - 446 K/uL    MPV 10.6 9.0 - 12.9 fL    Neutrophils-Polys 65.70 44.00 - 72.00 %    Lymphocytes 23.50 22.00 - 41.00 %    Monocytes 8.80 0.00 - 13.40 %    Eosinophils 0.90 0.00 - 6.90 %    Basophils 0.80 0.00 - 1.80 %    Immature Granulocytes 0.30 0.00 - 0.90 %    Nucleated RBC 0.00 0.00 - 0.20 /100 WBC    Neutrophils (Absolute) 5.05 1.82 - 7.42 K/uL    Lymphs (Absolute) 1.81 1.00 - 4.80 K/uL    Monos (Absolute) 0.68 0.00 - 0.85 K/uL    Eos (Absolute) 0.07 0.00 - 0.51 K/uL    Baso (Absolute) 0.06 0.00 - 0.12 K/uL    Immature Granulocytes (abs) 0.02 0.00 - 0.11 K/uL    NRBC (Absolute) 0.00 K/uL   COMP METABOLIC PANEL   Result Value Ref Range    Sodium 142 135 - 145 mmol/L    Potassium 3.9 3.6 - 5.5 mmol/L    Chloride 108 96 - 112 mmol/L    Co2 23 20 - 33 mmol/L    Anion Gap 11.0 7.0 - 16.0    Glucose 77 65 - 99 mg/dL    Bun 18 8 - 22 mg/dL    Creatinine 0.58 0.50 - 1.40 mg/dL    Calcium 9.3 8.5 - 10.5 mg/dL    Correct Calcium 8.9 8.5 - 10.5 mg/dL    AST(SGOT) 28 12 - 45 U/L    ALT(SGPT) 26 2 - 50 U/L    Alkaline Phosphatase 127 (H) 30 - 99 U/L    Total Bilirubin 0.6 0.1 - 1.5 mg/dL    Albumin 4.5 3.2 - 4.9 g/dL    Total Protein 7.6 6.0 - 8.2 g/dL    Globulin 3.1 1.9 - 3.5 g/dL    A-G Ratio 1.5 g/dL   LIPASE   Result Value Ref Range    Lipase 27 11 - 82 U/L   URINALYSIS CULTURE, IF INDICATED    Specimen: Urine, Clean Catch   Result Value Ref Range    Color Yellow     Character Clear     Specific Gravity 1.025 <1.035    Ph 7.5 5.0 - 8.0    Glucose Negative Negative mg/dL    Ketones Negative Negative mg/dL    Protein Negative Negative mg/dL    Bilirubin Negative Negative    Urobilinogen, Urine 0.2 Negative    Nitrite Negative Negative    Leukocyte Esterase Negative Negative    Occult Blood Negative Negative    Micro Urine Req see below    ESTIMATED GFR   Result Value Ref Range    GFR (CKD-EPI) 127 >60  mL/min/1.73 m 2        RADIOLOGY  I have independently interpreted the diagnostic imaging associated with this visit and am waiting the final reading from the radiologist.     Radiologist interpretation:     DX-CHEST-PORTABLE (1 VIEW)   Final Result      Bibasilar underinflation atelectasis            COURSE & MEDICAL DECISION MAKING    ED Observation Status? No; Patient does not meet criteria for ED Observation.     INITIAL ASSESSMENT, COURSE AND PLAN  Care Narrative: 39-year-old male presents emerged part with right upper quadrant and right lower chest wall pain.  This pain started 30 minutes prior to arrival.  It is improved now.    Patient's chart is reviewed.  Based on labs which are as recent as November of last year so I mildly low hemoglobin.  Metabolic panel was normal.  The patient has a remote history of a gallbladder ultrasound.  Is not reviewable.  Abdominal CT from several years ago was unremarkable.  Chest CT for PE from 2022 showed multiple areas of infiltrates consistent with pneumonia.  Visualized upper abdomen was normal.  It looks like he has not had a gallbladder resection.    The patient will be evaluated some basic labs and x-ray.  Difficult to obtain vital signs because of his movement disorder.    Patient will be observed here in the ED while we evaluate him for this pain.  He may require further workup and further imaging.    X-rays unremarkable.  Labs unremarkable.      Shortly after negative workup was called by the nursing staff the patient is demanding food and water.  He is given food and water.  I reassessed him shortly after that he feels better since he has no pain and would like to leave.  Appears to be at his baseline per chart review and I can understand.  Patient looks well.       Repeat abdominal seems benign without any tenderness or peritonitis otherwise has no other findings at all think he requires any further workup or imaging this time will be discharged with return  precautions and follow-up.  Questions answered agreeable to plan is discharged good condition.      ADDITIONAL PROBLEM LIST  Baseline cognitive impairment  Tardive dyskinesia  DISPOSITION AND DISCUSSIONS  I have discussed management of the patient with the following physicians and REBECA's: None      Escalation of care considered, and ultimately not performed:diagnostic imaging      Mk Yanez M.D.  [address] [place] [id number]  [phone]                FINAL DIAGNOSIS  1. Generalized abdominal pain           Electronically signed by: Angelo Issa M.D., 3/20/2024 9:21 AM

## 2024-03-20 NOTE — ED NOTES
Pt requesting to go to work.  Attempted to call pt's grandpa to update but no answer so voicemail left with callback number.  This also spoke with pt's caregiver, Kimberly, who states pt can take a taxi back to work. Per Kimberly, pt normally is independent with taking the bus to work and to home. Work address confirmed with Kimberly. SW contacted to assist with cab voucher.

## 2024-03-20 NOTE — DISCHARGE PLANNING
Pt in need of assistance with transportation. Pt has Medicaid. JOSEPH called MT and spoke to Lilia.   Pt requesting to return to work. Pt works at [address] [place] [address].   MT arranged for Lyft -  is Michael with a [employer] [place], arrive at 1135  SW updated RN.

## 2024-04-26 ENCOUNTER — DOCUMENTATION (OUTPATIENT)
Dept: HEALTH INFORMATION MANAGEMENT | Facility: OTHER | Age: 39
End: 2024-04-26
Payer: MEDICARE

## 2024-05-16 ENCOUNTER — HOSPITAL ENCOUNTER (OUTPATIENT)
Dept: LAB | Facility: MEDICAL CENTER | Age: 39
End: 2024-05-16
Attending: INTERNAL MEDICINE
Payer: MEDICARE

## 2024-05-16 ENCOUNTER — OFFICE VISIT (OUTPATIENT)
Dept: MEDICAL GROUP | Facility: PHYSICIAN GROUP | Age: 39
End: 2024-05-16
Payer: MEDICARE

## 2024-05-16 VITALS
BODY MASS INDEX: 23.15 KG/M2 | TEMPERATURE: 98.2 F | SYSTOLIC BLOOD PRESSURE: 110 MMHG | OXYGEN SATURATION: 97 % | WEIGHT: 156.3 LBS | DIASTOLIC BLOOD PRESSURE: 68 MMHG | HEIGHT: 69 IN | HEART RATE: 102 BPM

## 2024-05-16 DIAGNOSIS — R73.9 HYPERGLYCEMIA: Chronic | ICD-10-CM

## 2024-05-16 DIAGNOSIS — E78.5 DYSLIPIDEMIA: ICD-10-CM

## 2024-05-16 DIAGNOSIS — D50.9 IRON DEFICIENCY ANEMIA, UNSPECIFIED IRON DEFICIENCY ANEMIA TYPE: Chronic | ICD-10-CM

## 2024-05-16 DIAGNOSIS — G24.01 TARDIVE DYSKINESIA: Chronic | ICD-10-CM

## 2024-05-16 DIAGNOSIS — F70 MILD INTELLECTUAL DISABILITY: Chronic | ICD-10-CM

## 2024-05-16 DIAGNOSIS — F39 MOOD DISORDER (HCC): ICD-10-CM

## 2024-05-16 DIAGNOSIS — Z11.4 ENCOUNTER FOR SCREENING FOR HIV: ICD-10-CM

## 2024-05-16 DIAGNOSIS — B01.89 VARICELLA WITH COMPLICATION: ICD-10-CM

## 2024-05-16 DIAGNOSIS — F33.0 MILD EPISODE OF RECURRENT MAJOR DEPRESSIVE DISORDER (HCC): Chronic | ICD-10-CM

## 2024-05-16 DIAGNOSIS — E55.9 VITAMIN D DEFICIENCY: Chronic | ICD-10-CM

## 2024-05-16 DIAGNOSIS — F90.2 ATTENTION DEFICIT HYPERACTIVITY DISORDER (ADHD), COMBINED TYPE: ICD-10-CM

## 2024-05-16 PROBLEM — R74.8 ELEVATED ALKALINE PHOSPHATASE LEVEL: Status: RESOLVED | Noted: 2022-09-14 | Resolved: 2024-05-16

## 2024-05-16 PROBLEM — B35.1 ONYCHOMYCOSIS: Status: RESOLVED | Noted: 2023-11-16 | Resolved: 2024-05-16

## 2024-05-16 LAB
25(OH)D3 SERPL-MCNC: 22 NG/ML (ref 30–100)
ANION GAP SERPL CALC-SCNC: 10 MMOL/L (ref 7–16)
BUN SERPL-MCNC: 18 MG/DL (ref 8–22)
CALCIUM SERPL-MCNC: 9 MG/DL (ref 8.5–10.5)
CHLORIDE SERPL-SCNC: 103 MMOL/L (ref 96–112)
CHOLEST SERPL-MCNC: 116 MG/DL (ref 100–199)
CO2 SERPL-SCNC: 24 MMOL/L (ref 20–33)
CREAT SERPL-MCNC: 0.53 MG/DL (ref 0.5–1.4)
EST. AVERAGE GLUCOSE BLD GHB EST-MCNC: 100 MG/DL
FASTING STATUS PATIENT QL REPORTED: NORMAL
GFR SERPLBLD CREATININE-BSD FMLA CKD-EPI: 130 ML/MIN/1.73 M 2
GLUCOSE SERPL-MCNC: 93 MG/DL (ref 65–99)
HBA1C MFR BLD: 5.1 % (ref 4–5.6)
HDLC SERPL-MCNC: 48 MG/DL
LDLC SERPL CALC-MCNC: 57 MG/DL
POTASSIUM SERPL-SCNC: 4.2 MMOL/L (ref 3.6–5.5)
SODIUM SERPL-SCNC: 137 MMOL/L (ref 135–145)
TRIGL SERPL-MCNC: 57 MG/DL (ref 0–149)

## 2024-05-16 PROCEDURE — 3074F SYST BP LT 130 MM HG: CPT | Performed by: INTERNAL MEDICINE

## 2024-05-16 PROCEDURE — 3078F DIAST BP <80 MM HG: CPT | Performed by: INTERNAL MEDICINE

## 2024-05-16 PROCEDURE — 99214 OFFICE O/P EST MOD 30 MIN: CPT | Performed by: INTERNAL MEDICINE

## 2024-05-16 ASSESSMENT — PATIENT HEALTH QUESTIONNAIRE - PHQ9
6. FEELING BAD ABOUT YOURSELF - OR THAT YOU ARE A FAILURE OR HAVE LET YOURSELF OR YOUR FAMILY DOWN: NOT AL ALL
9. THOUGHTS THAT YOU WOULD BE BETTER OFF DEAD, OR OF HURTING YOURSELF: NOT AT ALL
7. TROUBLE CONCENTRATING ON THINGS, SUCH AS READING THE NEWSPAPER OR WATCHING TELEVISION: NOT AT ALL
3. TROUBLE FALLING OR STAYING ASLEEP OR SLEEPING TOO MUCH: NOT AT ALL
1. LITTLE INTEREST OR PLEASURE IN DOING THINGS: NOT AT ALL
4. FEELING TIRED OR HAVING LITTLE ENERGY: NOT AT ALL
SUM OF ALL RESPONSES TO PHQ9 QUESTIONS 1 AND 2: 0
8. MOVING OR SPEAKING SO SLOWLY THAT OTHER PEOPLE COULD HAVE NOTICED. OR THE OPPOSITE, BEING SO FIGETY OR RESTLESS THAT YOU HAVE BEEN MOVING AROUND A LOT MORE THAN USUAL: NOT AT ALL
SUM OF ALL RESPONSES TO PHQ QUESTIONS 1-9: 0
5. POOR APPETITE OR OVEREATING: NOT AT ALL
2. FEELING DOWN, DEPRESSED, IRRITABLE, OR HOPELESS: NOT AT ALL

## 2024-05-16 ASSESSMENT — FIBROSIS 4 INDEX: FIB4 SCORE: 0.89

## 2024-05-16 NOTE — ASSESSMENT & PLAN NOTE
Chronic condition.  Patient being treated with mirtazapine 30 Mg daily.  Patient tolerating medication well.  The patient denies SI.  No new symptoms reported.

## 2024-05-16 NOTE — ASSESSMENT & PLAN NOTE
Chronic stable.  The patient used to live in a group home before.  Routine daily activity including group activities at [facility] from Monday through Friday.  He also has a personal caregiver.  No new symptoms reported.

## 2024-05-16 NOTE — ASSESSMENT & PLAN NOTE
Chronic ongoing condition.  Patient followed by psychiatry service.  Patient is taking Vistaril 2 times a day.  Symptoms are well-controlled.  Patient denies SI.

## 2024-05-16 NOTE — ASSESSMENT & PLAN NOTE
This is a chronic condition.  Patient no longer taking Ritalin.  Symptoms are well-controlled.  Also followed by psychiatrist.

## 2024-05-16 NOTE — ASSESSMENT & PLAN NOTE
chronic condition but the patient is taking vitamin D supplementation.  The patient is due for lab test.

## 2024-05-16 NOTE — ASSESSMENT & PLAN NOTE
Chronic condition.  The patient is currently on diet therapy.  Lab test requested for follow-up.  Patient currently asymptomatic.

## 2024-05-16 NOTE — ASSESSMENT & PLAN NOTE
This is a chronic condition.  According to the patient symptoms noted after receiving COVID-vaccine.    Patient stated that he was seen by neurologist previously.  He is being treated with Ingrezza daily.  No symptoms reported.  The patient tolerating treatment well.

## 2024-05-16 NOTE — PROGRESS NOTES
PRIMARY CARE CLINIC VISIT        Chief Complaint   Patient presents with    Follow-Up     Lab results.       Follow-up depression  Attention deficit disorder  Mood disorder  Intellectual disability  Tardive dyskinesia  Hypoglycemia  Vitamin D deficiency  Patient and caregiver request lab test to include HIV and varicella titer  Medication review  Lab results. Pt was seen in  ER    History of Present Illness     Mild episode of recurrent major depressive disorder (HCC)  Chronic condition.  Patient being treated with mirtazapine 30 Mg daily.  Patient tolerating medication well.  The patient denies SI.  No new symptoms reported.    Attention deficit hyperactivity disorder (ADHD), combined type  This is a chronic condition.  Patient no longer taking Ritalin.  Symptoms are well-controlled.  Also followed by psychiatrist.    Mood disorder (HCC)  Chronic ongoing condition.  Patient followed by psychiatry service.  Patient is taking Vistaril 2 times a day.  Symptoms are well-controlled.  Patient denies SI.    Mild intellectual disability  Chronic stable.  The patient used to live in a group home before.  Routine daily activity including group activities at [facility] from Monday through Friday.  He also has a personal caregiver.  No new symptoms reported.    Tardive dyskinesia  This is a chronic condition.  According to the patient symptoms noted after receiving COVID-vaccine.    Patient stated that he was seen by neurologist previously.  He is being treated with Ingrezza daily.  No symptoms reported.  The patient tolerating treatment well.    Hyperglycemia  Chronic condition.  The patient is currently on diet therapy.  Lab test requested for follow-up.  Patient currently asymptomatic.    Vitamin D deficiency  chronic condition but the patient is taking vitamin D supplementation.  The patient is due for lab test.    Current Outpatient Medications on File Prior to Visit   Medication Sig Dispense Refill    ferrous  sulfate (FEROSUL) 325 (65 Fe) MG tablet TAKE 1 TABLET BY MOUTH EVERY MONDAY, WEDNESDAY AND FRIDAYS 36 Tablet 3    Valbenazine Tosylate (INGREZZA) 80 MG Cap Take 80 mg by mouth.      mirtazapine (REMERON) 30 MG Tab tablet       hydrOXYzine pamoate (VISTARIL) 50 MG Cap Take 50 mg by mouth 2 times a day.      cloNIDine (CATAPRES) 0.1 MG Tab Take 0.1 mg by mouth 2 times a day. Taking once daily      propranolol (INDERAL) 20 MG Tab Take 20 mg by mouth 2 times a day.       No current facility-administered medications on file prior to visit.        Allergies: Penicillins    Current Outpatient Medications Ordered in Epic   Medication Sig Dispense Refill    ferrous sulfate (FEROSUL) 325 (65 Fe) MG tablet TAKE 1 TABLET BY MOUTH EVERY MONDAY, WEDNESDAY AND FRIDAYS 36 Tablet 3    Valbenazine Tosylate (INGREZZA) 80 MG Cap Take 80 mg by mouth.      mirtazapine (REMERON) 30 MG Tab tablet       hydrOXYzine pamoate (VISTARIL) 50 MG Cap Take 50 mg by mouth 2 times a day.      cloNIDine (CATAPRES) 0.1 MG Tab Take 0.1 mg by mouth 2 times a day. Taking once daily      propranolol (INDERAL) 20 MG Tab Take 20 mg by mouth 2 times a day.       No current [facility]-ordered facility-administered medications on file.       Past Medical History:   Diagnosis Date    ADHD (attention deficit hyperactivity disorder)     Psychiatric problem     Mild Mental Retardation    Tardive dyskinesia        Past Surgical History:   Procedure Laterality Date    BLAIR RECTAL ABSCESS INCISION AND DRAINAGE N/A 2/26/2016    Procedure: BLAIR RECTAL ABSCESS INCISION AND DRAINAGE;  Surgeon: Shanna Lindo M.D.;  Location: SURGERY [facility];  Service:        Family History   Problem Relation Age of Onset    Bipolar disorder Brother     Other Brother        Social History     Tobacco Use   Smoking Status Former    Current packs/day: 0.25    Average packs/day: 0.3 packs/day for 18.2 years (4.6 ttl pk-yrs)    Types: Cigarettes    Start date: 2/27/2006   Smokeless  "Tobacco Never       Social History     Substance and Sexual Activity   Alcohol Use Not Currently    Comment: occasionally       Review of systems  As per HPI above. All other systems reviewed and negative.      Past Medical, Social, and Family history reviewed and updated in [facility]       LAB DATA:    Lab Results   Component Value Date/Time    HBA1C 5.1 11/16/2023 07:54 AM    HBA1C 5.4 03/27/2018 08:04 AM    HBA1C 5.3 08/03/2015 07:53 AM       Lab Results   Component Value Date/Time    WBC 7.7 03/20/2024 09:25 AM    HEMOGLOBIN 15.8 03/20/2024 09:25 AM    HEMATOCRIT 45.4 03/20/2024 09:25 AM    MCV 82.2 03/20/2024 09:25 AM    PLATELETCT 241 03/20/2024 09:25 AM       Lab Results   Component Value Date/Time    SODIUM 142 03/20/2024 09:25 AM    POTASSIUM 3.9 03/20/2024 09:25 AM    GLUCOSE 77 03/20/2024 09:25 AM    BUN 18 03/20/2024 09:25 AM    CREATININE 0.58 03/20/2024 09:25 AM       Lab Results   Component Value Date/Time    CHOLSTRLTOT 108 11/16/2023 07:54 AM    TRIGLYCERIDE 39 11/16/2023 07:54 AM    HDL 46 11/16/2023 07:54 AM    LDL 54 11/16/2023 07:54 AM       Lab Results   Component Value Date/Time    ALTSGPT 26 03/20/2024 09:25 AM          Objective     /68 (BP Location: Right arm, Patient Position: Sitting, BP Cuff Size: Adult)   Pulse (!) 102   Temp 36.8 °C (98.2 °F) (Temporal)   Ht 1.753 m (5' 9\")   Wt 70.9 kg (156 lb 4.8 oz)   SpO2 97%    Body mass index is 23.08 kg/m².    General: alert in no apparent distress.  Cardiovascular: regular rate and rhythm  Pulmonary: lungs : no wheezing   Gastrointestinal: BS present.   Neurology patient with involuntary movements noted consistent with tardive dyskinesia    Assessment and Plan     1. Mild episode of recurrent major depressive disorder (HCC)  This is a chronic and stable condition.  Continue with Remeron 30 Mg daily.  Continue follow-up with psychiatry service.    2. Attention deficit hyperactivity disorder (ADHD), combined type  Chronic stable.  Patient " currently not on therapy.  Symptoms are well-controlled.    Continue to monitor closely    3. Mood disorder (HCC)  This is a chronic and stable condition.  Patient may continue taking Vistaril 50 mg 2 times a day as needed.  Patient to follow-up with psychiatrist as directed.    4. Mild intellectual disability  Chronic stable condition.  Patient will continue with group activities Monday through Friday as above.  Continue to monitor.  Patient is being cared for by brother and caregiver.    5. Tardive dyskinesia  Chronic stable condition.  Continue with Ingrezza 80 Mg daily.    6. Hyperglycemia  - Basic Metabolic Panel; Future  - HEMOGLOBIN A1C; Future  Chronic condition.  Current status unclear.  Lab test ordered for follow-up.  Recommend low sweet low-carb diet.  Continue to monitor.    7. Vitamin D deficiency  - VITAMIN D,25 HYDROXY (DEFICIENCY); Future  This is a chronic condition.  Current status unclear.  Lab test requested to check vitamin D.  Recommend patient to continue taking vitamin D supplementation.    8. Dyslipidemia  Chronic condition.  Current status unclear.  Lab test ordered to check lipid panel.  Continue with low-fat low-cholesterol diet.  - Lipid Profile; Future    9. Encounter for screening for HIV  10. Varicella screening  Lab test ordered to check HIV and varicella as per patient and caregiver request.  Patient asymptomatic.  Advised the patient to check with the lab to be sure these labs are not covered by insurance.          Attestation: I spent:   37  minutes -    That includes time for chart review before the visit, the actual patient visit, and time spent on documentation in EMR after the visit.  Chart review/prep, review of other providers' records, imaging/lab review, face-to-face time for history/examination, pt's counseling/education, ordering, prescribing,  review of results/meds/ treatment plan with patient, and care coordination.               Please note that this dictation was  created using voice recognition software. I have made every reasonable attempt to correct obvious errors, but I expect that there are errors of grammar and possibly content that I did not discover before finalizing the note.    Mk Yanez MD  Internal Medicine  [facility]

## 2024-06-26 ENCOUNTER — HOSPITAL ENCOUNTER (EMERGENCY)
Facility: MEDICAL CENTER | Age: 39
End: 2024-06-26
Attending: EMERGENCY MEDICINE
Payer: MEDICARE

## 2024-06-26 VITALS
DIASTOLIC BLOOD PRESSURE: 57 MMHG | RESPIRATION RATE: 18 BRPM | HEART RATE: 69 BPM | BODY MASS INDEX: 22.66 KG/M2 | WEIGHT: 153 LBS | TEMPERATURE: 98.5 F | SYSTOLIC BLOOD PRESSURE: 109 MMHG | HEIGHT: 69 IN | OXYGEN SATURATION: 90 %

## 2024-06-26 DIAGNOSIS — M54.50 ACUTE BILATERAL LOW BACK PAIN WITHOUT SCIATICA: ICD-10-CM

## 2024-06-26 PROCEDURE — 700111 HCHG RX REV CODE 636 W/ 250 OVERRIDE (IP): Performed by: EMERGENCY MEDICINE

## 2024-06-26 PROCEDURE — 700102 HCHG RX REV CODE 250 W/ 637 OVERRIDE(OP): Mod: UD | Performed by: EMERGENCY MEDICINE

## 2024-06-26 PROCEDURE — A9270 NON-COVERED ITEM OR SERVICE: HCPCS | Mod: UD | Performed by: EMERGENCY MEDICINE

## 2024-06-26 PROCEDURE — 99283 EMERGENCY DEPT VISIT LOW MDM: CPT

## 2024-06-26 RX ORDER — PREDNISONE 20 MG/1
60 TABLET ORAL DAILY
Qty: 12 TABLET | Refills: 0 | Status: SHIPPED | OUTPATIENT
Start: 2024-06-26 | End: 2024-06-27

## 2024-06-26 RX ORDER — OXYCODONE HYDROCHLORIDE AND ACETAMINOPHEN 5; 325 MG/1; MG/1
1 TABLET ORAL ONCE
Status: COMPLETED | OUTPATIENT
Start: 2024-06-26 | End: 2024-06-26

## 2024-06-26 RX ORDER — PREDNISONE 20 MG/1
60 TABLET ORAL DAILY
Status: COMPLETED | OUTPATIENT
Start: 2024-06-26 | End: 2024-06-26

## 2024-06-26 RX ADMIN — PREDNISONE 60 MG: 20 TABLET ORAL at 10:51

## 2024-06-26 RX ADMIN — OXYCODONE AND ACETAMINOPHEN 1 TABLET: 5; 325 TABLET ORAL at 10:51

## 2024-06-26 ASSESSMENT — FIBROSIS 4 INDEX: FIB4 SCORE: 0.89

## 2024-06-26 NOTE — ED NOTES
SW got in contact with pts grandmother to confirm that pt is living at [address] [place] [address] with his brother Oliver. Called pts brother Oliver [phone] and left voicemail.

## 2024-06-26 NOTE — ED PROVIDER NOTES
ED Provider Note    CHIEF COMPLAINT  Chief Complaint   Patient presents with    Low Back Pain     X 1 week. Pt denies any falls or trauma     HPI/ROS    Leodan Hyatt is a 39 y.o. male who presents with low back pain.  The patient states he had this over last couple of hours.  He is a poor historian as he also told triage he said it for about a week.  He states the pain is in the midline.  There is no radicular component to his extremities.  He does not have any paresthesias nor functional loss of his lower extremities.  He is unaware of any direct trauma.  He does not have any difficulty with bowel or bladder function.    PAST MEDICAL HISTORY   has a past medical history of ADHD (attention deficit hyperactivity disorder), Psychiatric problem, and Tardive dyskinesia.    SURGICAL HISTORY   has a past surgical history that includes sintia rectal abscess incision and drainage (N/A, 2/26/2016).    FAMILY HISTORY  Family History   Problem Relation Age of Onset    Bipolar disorder Brother     Other Brother        SOCIAL HISTORY  Social History     Tobacco Use    Smoking status: Former     Current packs/day: 0.25     Average packs/day: 0.3 packs/day for 18.3 years (4.6 ttl pk-yrs)     Types: Cigarettes     Start date: 2/27/2006    Smokeless tobacco: Never   Vaping Use    Vaping status: Former   Substance and Sexual Activity    Alcohol use: Not Currently     Comment: occasionally    Drug use: Not Currently     Types: Inhaled, Oral     Comment: marijuana occasionally    Sexual activity: Not on file       CURRENT MEDICATIONS  Home Medications       Reviewed by Mya Wang R.N. (Registered Nurse) on 06/26/24 at 0928  Med List Status: Partial     Medication Last Dose Status   cloNIDine (CATAPRES) 0.1 MG Tab  Active   ferrous sulfate (FEROSUL) 325 (65 Fe) MG tablet  Active   hydrOXYzine pamoate (VISTARIL) 50 MG Cap  Active   mirtazapine (REMERON) 30 MG Tab tablet  Active   propranolol (INDERAL) 20 MG Tab  Active  "  Valbenazine Tosylate (INGREZZA) 80 MG Cap  Active                    ALLERGIES  Allergies   Allergen Reactions    Penicillins        PHYSICAL EXAM  VITAL SIGNS: /76   Pulse 97   Temp 37.3 °C (99.2 °F) (Temporal)   Resp 18   Ht 1.753 m (5' 9\")   Wt 69.4 kg (153 lb)   SpO2 92%   BMI 22.59 kg/m²    General the patient appears uncomfortable    Back examination he does have some diffuse discomfort throughout the lumbar spine with no midline step-offs nor surrounding erythema    Skin no erythema nor induration as mentioned above    Extremities lower extremities atraumatic    Neurologic examination motor is 5 out of 5 and symmetric throughout the lower extremities with sensation intact.      COURSE & MEDICAL DECISION MAKING    This is a 39-year-old male who presents to the emerged part with low back discomfort.  I suspect this is from a muscular etiology.  The patient received Percocet as well as prednisone and a repeat examination is significant improvement.  He continues to be neurologically intact.  I will discharge the patient home on 4 more days of prednisone and he will continue his medication otherwise.  The patient will return if he is acutely worse.    FINAL DIAGNOSIS  Low back pain    Disposition  The patient will be discharged in stable condition       Electronically signed by: Cole Cotton M.D., 6/26/2024 10:43 AM      "

## 2024-06-26 NOTE — ED TRIAGE NOTES
"Chief Complaint   Patient presents with    Low Back Pain     X 1 week. Pt denies any fall or trauma     Pt BIB EMS for above.  Pt given tylenol and ibuprofen by [facility]. Pt ambulatory into triage for above. Pt has hx of tardive dyskinesia. Educated pt on triage process and to notify if there is any change.      /76   Pulse 97   Temp 37.3 °C (99.2 °F) (Temporal)   Resp 18   Ht 1.753 m (5' 9\")   Wt 69.4 kg (153 lb)   SpO2 92%   BMI 22.59 kg/m²     "

## 2024-06-26 NOTE — ED NOTES
This RN called [facility] for pts ride.    Pt provided discharge instructions. Pt verbalized understanding. Pt leaving ER in stable condition, pt ambulatory with steady gait.

## 2024-06-27 ENCOUNTER — HOSPITAL ENCOUNTER (EMERGENCY)
Facility: MEDICAL CENTER | Age: 39
End: 2024-06-27
Attending: EMERGENCY MEDICINE
Payer: MEDICARE

## 2024-06-27 ENCOUNTER — PHARMACY VISIT (OUTPATIENT)
Dept: PHARMACY | Facility: MEDICAL CENTER | Age: 39
End: 2024-06-27
Payer: COMMERCIAL

## 2024-06-27 VITALS
TEMPERATURE: 98.9 F | HEIGHT: 69 IN | OXYGEN SATURATION: 98 % | SYSTOLIC BLOOD PRESSURE: 105 MMHG | DIASTOLIC BLOOD PRESSURE: 72 MMHG | WEIGHT: 145.06 LBS | BODY MASS INDEX: 21.49 KG/M2 | HEART RATE: 89 BPM | RESPIRATION RATE: 18 BRPM

## 2024-06-27 DIAGNOSIS — S39.012A STRAIN OF LUMBAR REGION, INITIAL ENCOUNTER: ICD-10-CM

## 2024-06-27 DIAGNOSIS — M54.50 ACUTE BILATERAL LOW BACK PAIN WITHOUT SCIATICA: ICD-10-CM

## 2024-06-27 PROCEDURE — RXMED WILLOW AMBULATORY MEDICATION CHARGE: Performed by: EMERGENCY MEDICINE

## 2024-06-27 PROCEDURE — 99283 EMERGENCY DEPT VISIT LOW MDM: CPT

## 2024-06-27 PROCEDURE — 96372 THER/PROPH/DIAG INJ SC/IM: CPT

## 2024-06-27 PROCEDURE — 700111 HCHG RX REV CODE 636 W/ 250 OVERRIDE (IP): Mod: JZ,UD | Performed by: EMERGENCY MEDICINE

## 2024-06-27 RX ORDER — PREDNISONE 20 MG/1
60 TABLET ORAL DAILY
Qty: 12 TABLET | Refills: 0 | Status: SHIPPED | OUTPATIENT
Start: 2024-06-27 | End: 2024-07-01

## 2024-06-27 RX ORDER — KETOROLAC TROMETHAMINE 15 MG/ML
15 INJECTION, SOLUTION INTRAMUSCULAR; INTRAVENOUS ONCE
Status: COMPLETED | OUTPATIENT
Start: 2024-06-27 | End: 2024-06-27

## 2024-06-27 RX ADMIN — KETOROLAC TROMETHAMINE 15 MG: 15 INJECTION, SOLUTION INTRAMUSCULAR; INTRAVENOUS at 10:28

## 2024-06-27 ASSESSMENT — FIBROSIS 4 INDEX: FIB4 SCORE: 0.89

## 2024-06-27 NOTE — ED NOTES
Reviewed discharge paperwork with patient. Patient verbalized understanding of instructions and medication. Will f/u accordingly. Denies further questions at this time. Ambulatory out of ER with steady gait.

## 2024-06-27 NOTE — ED TRIAGE NOTES
Chief Complaint   Patient presents with    Back Pain     Patient reports lower mid back pain. Patient seen yesterday reports he has not picked up prescriptions.

## 2024-06-27 NOTE — ED PROVIDER NOTES
ED Provider Note    CHIEF COMPLAINT  Chief Complaint   Patient presents with    Back Pain     Patient reports lower mid back pain. Patient seen yesterday reports he has not picked up prescriptions.        EXTERNAL RECORDS REVIEWED  For visit from 6/26 here in the emergency department where the patient was seen yesterday for a week of nontraumatic back pain.  He had physical exam findings that showed easily reproducible soft tissue discomfort throughout the lumbar spine with no midline tenderness, no weakness or numbness in the lower extremities and no concerning cauda equina symptomology.  Treated with: Symptomology.  Treated with  pain medications and steroid and was doing well.  Was recommended to take 4 additional days of steroid and follow-up in the outpatient setting.    HPI/ROS  LIMITATION TO HISTORY   Select: : None  OUTSIDE HISTORIAN(S):  None    Leodan Hyatt is a 39 y.o. male who presents to the emergency room for ongoing back discomfort.  He says this is a soreness, it is worse with certain positional movements, he is unsure of what caused this initially though he bends over to  objects off the ground, he occasionally reaches up and just feels like this was worse.  While it originally happened about 6 to 7 days ago he reports over the last 2 days it has been worse.  He is pointing to both flanks, is easily recreatable pain with doing torsional movements at the bedside, bends down and gets to about 90 degrees before having pain and discomfort.  He is denying any bowel or bladder incontinence, no recent trauma or falls, he has had no fevers chills and denies any weakness in his lower extremities, no numbness, no foot drop.  Patient had been given pain medications and a steroid here in the emergency department, patient forgot to go to the pharmacy and had not picked up these medications thereafter.    PAST MEDICAL HISTORY   has a past medical history of ADHD (attention deficit hyperactivity  "disorder), Psychiatric problem, and Tardive dyskinesia.    SURGICAL HISTORY   has a past surgical history that includes sintia rectal abscess incision and drainage (N/A, 2/26/2016).    FAMILY HISTORY  Family History   Problem Relation Age of Onset    Bipolar disorder Brother     Other Brother        SOCIAL HISTORY  Social History     Tobacco Use    Smoking status: Former     Current packs/day: 0.25     Average packs/day: 0.3 packs/day for 18.3 years (4.6 ttl pk-yrs)     Types: Cigarettes     Start date: 2/27/2006    Smokeless tobacco: Never   Vaping Use    Vaping status: Former   Substance and Sexual Activity    Alcohol use: Not Currently     Comment: occasionally    Drug use: Not Currently     Types: Inhaled, Oral     Comment: marijuana occasionally    Sexual activity: Not on file       CURRENT MEDICATIONS  Home Medications       Reviewed by Sandeep Worthington R.N. (Registered Nurse) on 06/27/24 at 1020  Med List Status: Partial     Medication Last Dose Status   cloNIDine (CATAPRES) 0.1 MG Tab  Active   ferrous sulfate (FEROSUL) 325 (65 Fe) MG tablet  Active   hydrOXYzine pamoate (VISTARIL) 50 MG Cap  Active   mirtazapine (REMERON) 30 MG Tab tablet  Active   predniSONE (DELTASONE) 20 MG Tab  Active   propranolol (INDERAL) 20 MG Tab  Active   Valbenazine Tosylate (INGREZZA) 80 MG Cap  Active                    ALLERGIES  Allergies   Allergen Reactions    Penicillins        PHYSICAL EXAM  VITAL SIGNS: /72   Pulse 89   Temp 37.2 °C (98.9 °F) (Temporal)   Resp 18   Ht 1.753 m (5' 9\")   Wt 65.8 kg (145 lb 1 oz)   SpO2 98%   BMI 21.42 kg/m²    Genl: M sitting in chair comfortably, speaking clearly, appears in no acute distress   Head: NC/AT   Pulmonary: Lungs are clear to auscultation bilaterally  CV:  RRR, no murmur appreciated  Abdomen: soft, NT/ND; no rebound/guarding  : no CVA or suprapubic tenderness, no groin numbness  Musculoskeletal: Pain free ROM of the neck. Moving upper and lower extremities in " spontaneous and coordinated fashion  Neuro: A&Ox4 (person, place, time, situation), speech fluent, gait steady, no focal deficits appreciated, No cerebellar signs. Sensation is grossly intact in the distal upper and lower extremities.  5/5 strength in  and dorsiflexion/plantar flexion of the ankles  Skin: No rash or lesions.  No pallor or jaundice.  No cyanosis.  Warm and dry.     EKG/LABS  none    RADIOLOGY/PROCEDURES   none    COURSE & MEDICAL DECISION MAKING    ASSESSMENT, COURSE AND PLAN  Care Narrative: Patient presents emergency room for symptoms as described above.  The patient is nontoxic in appearance, has underlying educational deficit and psychiatric illness, does not have traumatic back discomfort, has easily reproducible soft tissue tenderness along the musculatures of the strap muscles of the lower back with no impediment in flexion extension of the hip, no referred abdominal pain and no groin numbness, bowel or bladder incontinence.  With no diabetic history, no history of immunosuppression and stable vitals there is no indications for extensive lab workup or medical imaging at this time.  Patient had been seen yesterday and given antiinflammatory/steroid medications and was instructed on taking a short course of these which she did not .  I have changed his pharmacy to a pharmacy and walking distance from our facility and he feels comfortable picking them up.  He is given a shot of Toradol, feels improved and can be discharged home in stable condition.    At the time of discharge there is no findings concerning for acute central process, outpatient workup and further management/physical therapy would be recommended.    DISPOSITION AND DISCUSSIONS  I have discussed management of the patient with the following physicians and REBECA's:  none    Discussion of management with other QHP or appropriate source(s): None     Escalation of care considered, and ultimately not performed:Laboratory  analysis and diagnostic imaging    Barriers to care at this time, including but not limited to: none.     Decision tools and prescription drugs considered including, but not limited to: none.    FINAL DIAGNOSIS  1. Strain of lumbar region, initial encounter    2. Acute bilateral low back pain without sciatica      Electronically signed by: Ari Blancas M.D., 6/27/2024 10:07 AM

## 2024-08-07 ENCOUNTER — APPOINTMENT (OUTPATIENT)
Dept: MEDICAL GROUP | Facility: PHYSICIAN GROUP | Age: 39
End: 2024-08-07
Payer: MEDICARE

## 2024-08-26 ENCOUNTER — PATIENT MESSAGE (OUTPATIENT)
Dept: HEALTH INFORMATION MANAGEMENT | Facility: OTHER | Age: 39
End: 2024-08-26

## 2024-09-17 ENCOUNTER — APPOINTMENT (OUTPATIENT)
Dept: MEDICAL GROUP | Facility: PHYSICIAN GROUP | Age: 39
End: 2024-09-17
Payer: MEDICARE

## 2024-09-17 VITALS
DIASTOLIC BLOOD PRESSURE: 78 MMHG | BODY MASS INDEX: 20.29 KG/M2 | TEMPERATURE: 97 F | HEART RATE: 86 BPM | WEIGHT: 137 LBS | SYSTOLIC BLOOD PRESSURE: 108 MMHG | OXYGEN SATURATION: 97 % | HEIGHT: 69 IN | RESPIRATION RATE: 18 BRPM

## 2024-09-17 DIAGNOSIS — G89.29 CHRONIC MIDLINE LOW BACK PAIN WITHOUT SCIATICA: ICD-10-CM

## 2024-09-17 DIAGNOSIS — F33.0 MILD EPISODE OF RECURRENT MAJOR DEPRESSIVE DISORDER (HCC): Chronic | ICD-10-CM

## 2024-09-17 DIAGNOSIS — F39 MOOD DISORDER (HCC): ICD-10-CM

## 2024-09-17 DIAGNOSIS — G24.01 TARDIVE DYSKINESIA: Chronic | ICD-10-CM

## 2024-09-17 DIAGNOSIS — F90.2 ATTENTION DEFICIT HYPERACTIVITY DISORDER (ADHD), COMBINED TYPE: ICD-10-CM

## 2024-09-17 DIAGNOSIS — M54.50 CHRONIC MIDLINE LOW BACK PAIN WITHOUT SCIATICA: ICD-10-CM

## 2024-09-17 PROCEDURE — 3078F DIAST BP <80 MM HG: CPT | Performed by: INTERNAL MEDICINE

## 2024-09-17 PROCEDURE — 99214 OFFICE O/P EST MOD 30 MIN: CPT | Performed by: INTERNAL MEDICINE

## 2024-09-17 PROCEDURE — 3074F SYST BP LT 130 MM HG: CPT | Performed by: INTERNAL MEDICINE

## 2024-09-17 RX ORDER — AMANTADINE HYDROCHLORIDE 100 MG/1
TABLET ORAL
COMMUNITY
Start: 2024-07-10

## 2024-09-17 ASSESSMENT — FIBROSIS 4 INDEX: FIB4 SCORE: 0.89

## 2024-09-17 NOTE — ASSESSMENT & PLAN NOTE
Chronic condition.  The patient followed by private psychiatrist.  Patient currently taking mirtazapine.

## 2024-09-17 NOTE — PROGRESS NOTES
PRIMARY CARE CLINIC VISIT        Chief Complaint   Patient presents with    Referral Needed     Neurology and Physical therapy     Seizure     Had a seizure 09/05/24.      Referral to neurology  Referral to physical therapy tardive dyskinesia  Depression  Attention deficit disorder  Mood disorder  Medication review        History of Present Illness     Chronic midline low back pain without sciatica  This is a chronic condition.  Patient denied recent trauma or injury.  Patient with history of tardive dyskinesia patient request referral to physical therapy.  Patient denied bowel or bladder dysfunction.  Denied motor weakness or paresthesia    Mild episode of recurrent major depressive disorder (HCC)  Chronic condition.  The patient followed by private psychiatrist.  Patient currently taking mirtazapine.    Tardive dyskinesia  Chronic condition.  According to the patient the symptoms noted after receiving COVID-vaccine.  Patient requests referral to follow-up with neurologist patient currently being treated with Ingrezza and amantadine.  Tolerating the prescription well.    Attention deficit hyperactivity disorder (ADHD), combined type  Chronic condition.  The patient no longer taking Ritalin.  Patient followed by private psychiatrist.  No new symptoms reported.    Current Outpatient Medications on File Prior to Visit   Medication Sig Dispense Refill    amantadine (SYMMETREL) 100 MG Tab       ferrous sulfate (FEROSUL) 325 (65 Fe) MG tablet TAKE 1 TABLET BY MOUTH EVERY MONDAY, WEDNESDAY AND FRIDAYS 36 Tablet 3    Valbenazine Tosylate (INGREZZA) 80 MG Cap Take 80 mg by mouth.      mirtazapine (REMERON) 30 MG Tab tablet       hydrOXYzine pamoate (VISTARIL) 50 MG Cap Take 50 mg by mouth 2 times a day.      cloNIDine (CATAPRES) 0.1 MG Tab Take 0.1 mg by mouth 2 times a day. Taking once daily      propranolol (INDERAL) 20 MG Tab Take 20 mg by mouth 2 times a day.       No current facility-administered medications on file  prior to visit.        Allergies: Penicillins    Current Outpatient Medications Ordered in Epic   Medication Sig Dispense Refill    amantadine (SYMMETREL) 100 MG Tab       ferrous sulfate (FEROSUL) 325 (65 Fe) MG tablet TAKE 1 TABLET BY MOUTH EVERY MONDAY, WEDNESDAY AND FRIDAYS 36 Tablet 3    Valbenazine Tosylate (INGREZZA) 80 MG Cap Take 80 mg by mouth.      mirtazapine (REMERON) 30 MG Tab tablet       hydrOXYzine pamoate (VISTARIL) 50 MG Cap Take 50 mg by mouth 2 times a day.      cloNIDine (CATAPRES) 0.1 MG Tab Take 0.1 mg by mouth 2 times a day. Taking once daily      propranolol (INDERAL) 20 MG Tab Take 20 mg by mouth 2 times a day.       No current [facility]-ordered facility-administered medications on file.       Past Medical History:   Diagnosis Date    ADHD (attention deficit hyperactivity disorder)     Psychiatric problem     Mild Mental Retardation    Tardive dyskinesia        Past Surgical History:   Procedure Laterality Date    BLAIR RECTAL ABSCESS INCISION AND DRAINAGE N/A 2/26/2016    Procedure: BLAIR RECTAL ABSCESS INCISION AND DRAINAGE;  Surgeon: Shanna Lindo M.D.;  Location: SURGERY [facility];  Service:        Family History   Problem Relation Age of Onset    Bipolar disorder Brother     Other Brother        Social History     Tobacco Use   Smoking Status Former    Current packs/day: 0.25    Average packs/day: 0.3 packs/day for 18.6 years (4.6 ttl pk-yrs)    Types: Cigarettes    Start date: 2/27/2006   Smokeless Tobacco Never       Social History     Substance and Sexual Activity   Alcohol Use Not Currently    Comment: occasionally       Review of systems  As per HPI above. All other systems reviewed and negative.      Past Medical, Social, and Family history reviewed and updated in [facility]       LAB DATA:     I have independently reviewed / interpreted labs    Lab Results   Component Value Date/Time    HBA1C 5.1 05/16/2024 08:03 AM    HBA1C 5.1 11/16/2023 07:54 AM    HBA1C 5.4 03/27/2018 08:04  "AM        Lab Results   Component Value Date/Time    WBC 7.7 03/20/2024 09:25 AM    HEMOGLOBIN 15.8 03/20/2024 09:25 AM    HEMATOCRIT 45.4 03/20/2024 09:25 AM    MCV 82.2 03/20/2024 09:25 AM    PLATELETCT 241 03/20/2024 09:25 AM       Lab Results   Component Value Date/Time    SODIUM 137 05/16/2024 08:03 AM    POTASSIUM 4.2 05/16/2024 08:03 AM    GLUCOSE 93 05/16/2024 08:03 AM    BUN 18 05/16/2024 08:03 AM    CREATININE 0.53 05/16/2024 08:03 AM       Lab Results   Component Value Date/Time    CHOLSTRLTOT 116 05/16/2024 08:03 AM    TRIGLYCERIDE 57 05/16/2024 08:03 AM    HDL 48 05/16/2024 08:03 AM    LDL 57 05/16/2024 08:03 AM       Lab Results   Component Value Date/Time    ALTSGPT 26 03/20/2024 09:25 AM          Objective     /78   Pulse 86   Temp 36.1 °C (97 °F) (Temporal)   Resp 18   Ht 1.753 m (5' 9\")   Wt 62.1 kg (137 lb)   SpO2 97%    Body mass index is 20.23 kg/m².    General: alert in no apparent distress.  Cardiovascular: regular rate and rhythm  Pulmonary: lungs : no wheezing   Gastrointestinal: BS present.   Low back: No significant spinal deformity noted.   Pain noted w palpation of the lumbar region.  ROM : flexion, extension, rotation, lateral bend of back limited due to pain       Assessment and Plan     1. Chronic midline low back pain without sciatica  Chronic condition.  Uncontrolled.    - Referral to Physical Therapy  - DX-LUMBAR SPINE-4+ VIEWS; Future  - Advised pt re: neck/ back care, posture and regular stretching exercises.   Rec to maintain ideal weight.   Ice/heat application as directed.  Avoid heavy lifting or activities which may aggravate pt's condition.   Consider the use of over the counter topical treatment such as  Biofreeze as needed       2. Tardive dyskinesia  This is a chronic condition.  Uncontrolled.  Patient request update referral to neurology.  Patient will continue taking Ingrezza 80 Mg daily.  Amantadine 100 mg daily.  - Referral to Neurology    3. Mild episode " of recurrent major depressive disorder (HCC)  Chronic stable condition.  Continue mirtazapine 30 Mg daily.  Continue follow-up with psychiatry service    4. Attention deficit hyperactivity disorder (ADHD), combined type  Chronic condition.  Stable without specific therapy at this time.  Continue follow-up with psychiatry service    5. Mood disorder (HCC)  Chronic stable.  Continue hydroxyzine 50 mg daily as needed.  Continue follow-up with psychiatry service.                Please note that this dictation was created using voice recognition software. I have made every reasonable attempt to correct obvious errors, but I expect that there are errors of grammar and possibly content that I did not discover before finalizing the note.    Mk Yanez MD  Internal Medicine  [place] [facility]

## 2024-09-17 NOTE — ASSESSMENT & PLAN NOTE
This is a chronic condition.  Patient denied recent trauma or injury.  Patient with history of tardive dyskinesia patient request referral to physical therapy.  Patient denied bowel or bladder dysfunction.  Denied motor weakness or paresthesia

## 2024-09-17 NOTE — ASSESSMENT & PLAN NOTE
Chronic condition.  The patient no longer taking Ritalin.  Patient followed by private psychiatrist.  No new symptoms reported.

## 2024-09-17 NOTE — ASSESSMENT & PLAN NOTE
Chronic condition.  According to the patient the symptoms noted after receiving COVID-vaccine.  Patient requests referral to follow-up with neurologist patient currently being treated with Ingrezza and amantadine.  Tolerating the prescription well.

## 2024-09-17 NOTE — ASSESSMENT & PLAN NOTE
Chronic condition.  Followed by private psychiatrist.  Patient takes hydroxyzine as needed.  Symptoms are well-controlled.  Tolerating medication well.

## 2024-09-25 ENCOUNTER — HOSPITAL ENCOUNTER (OUTPATIENT)
Dept: RADIOLOGY | Facility: MEDICAL CENTER | Age: 39
End: 2024-09-25
Attending: INTERNAL MEDICINE
Payer: MEDICARE

## 2024-09-25 DIAGNOSIS — G89.29 CHRONIC MIDLINE LOW BACK PAIN WITHOUT SCIATICA: ICD-10-CM

## 2024-09-25 DIAGNOSIS — M54.50 CHRONIC MIDLINE LOW BACK PAIN WITHOUT SCIATICA: ICD-10-CM

## 2024-09-25 DIAGNOSIS — S32.020D COMPRESSION FRACTURE OF L2 VERTEBRA WITH ROUTINE HEALING, SUBSEQUENT ENCOUNTER: ICD-10-CM

## 2024-09-25 PROBLEM — S32.020A COMPRESSION FRACTURE OF L2 (HCC): Status: ACTIVE | Noted: 2024-09-25

## 2024-09-25 PROCEDURE — 72110 X-RAY EXAM L-2 SPINE 4/>VWS: CPT

## 2024-09-27 ENCOUNTER — PHYSICAL THERAPY (OUTPATIENT)
Dept: PHYSICAL THERAPY | Facility: REHABILITATION | Age: 39
End: 2024-09-27
Attending: INTERNAL MEDICINE
Payer: MEDICARE

## 2024-09-27 DIAGNOSIS — G89.29 CHRONIC MIDLINE LOW BACK PAIN WITHOUT SCIATICA: ICD-10-CM

## 2024-09-27 DIAGNOSIS — M54.50 CHRONIC MIDLINE LOW BACK PAIN WITHOUT SCIATICA: ICD-10-CM

## 2024-09-27 PROCEDURE — 97014 ELECTRIC STIMULATION THERAPY: CPT

## 2024-09-27 PROCEDURE — 97161 PT EVAL LOW COMPLEX 20 MIN: CPT

## 2024-09-27 SDOH — ECONOMIC STABILITY: GENERAL: QUALITY OF LIFE: FAIR

## 2024-09-27 ASSESSMENT — ENCOUNTER SYMPTOMS
PAIN SCALE AT HIGHEST: 9
PAIN SCALE: 7
PAIN SCALE AT LOWEST: 1

## 2024-09-27 NOTE — OP THERAPY EVALUATION
Outpatient Physical Therapy  INITIAL EVALUATION    [facility] [place]  [address], [address]  [place] [address]  Phone:  [phone]  Fax:  [phone]    Date of Evaluation: 2024    Patient: Leodan Hyatt  YOB: 1985  MRN: [id number]     Referring Provider: Mk Yanez M.D.  [address] [place] [address],  [place] [address]   Referring Diagnosis Chronic midline low back pain without sciatica [M54.50, G89.29]     Time Calculation  Start time: 1545  Stop time: 1630 Time Calculation (min): 45 minutes         Chief Complaint: Back Problem    Visit Diagnoses     ICD-10-CM   1. Chronic midline low back pain without sciatica  M54.50    G89.29       Date of onset of impairment: 2022    Subjective:   History of Present Illness:     Date of onset:  2022  Quality of life:  Fair  Prior level of function:  Ongoing hx of ADHD and TD  Pain:     Current pain ratin    At best pain ratin    At worst pain ratin  Activities of Daily Living:     Patient reported ADL status: Limited with prolonged standing/sitting due to TD  Limited with participation in work related tasks due to TD  Patient Goals:     Patient goals for therapy:  Improved balance, increased motion, increased strength and decreased pain  Patient is a 39 y.o. male that presents to therapy with lower back pain. States that symptoms were insidious in onset, likely due to Tardive Dyskinesia. Reports the pain quality to be sharp/dull, constant and are primarily lumbar/thoracic back pain. Reports that symptoms now worsening. States that aggravating factors are excessive movement.  States that easng factors are supine position. Patient demonstrated very high amplitude movements of all parts of the body leading to the inability to sit stationary for more than 3 seconds.       Past Medical History:   Diagnosis Date    ADHD (attention deficit hyperactivity disorder)     Psychiatric problem     Mild Mental  Retardation    Tardive dyskinesia      Past Surgical History:   Procedure Laterality Date    BLAIR RECTAL ABSCESS INCISION AND DRAINAGE N/A 2/26/2016    Procedure: BLAIR RECTAL ABSCESS INCISION AND DRAINAGE;  Surgeon: Shanna Lindo M.D.;  Location: SURGERY [facility];  Service:      Social History     Tobacco Use    Smoking status: Former     Current packs/day: 0.25     Average packs/day: 0.3 packs/day for 18.6 years (4.6 ttl pk-yrs)     Types: Cigarettes     Start date: 2/27/2006    Smokeless tobacco: Never   Substance Use Topics    Alcohol use: Not Currently     Comment: occasionally     Family and Occupational History     Socioeconomic History    Marital status: Single     Spouse name: Not on file    Number of children: Not on file    Years of education: Not on file    Highest education level: Not on file   Occupational History    Not on file       Objective     Postural Observations  Seated posture: poor  Standing posture: poor      Neurological Testing     Myotome testing   Lumbar (left)   L1 (hip flexors): 5  L2 (hip flexors): 5  L3 (knee extensors): 5  L4 (ankle dorsiflexors): 5  L5 (great toe extension): 5  S1 (ankle plantar flexors): 5    Lumbar (right)   L1 (hip flexors): 5  L2 (hip flexors): 5  L3 (knee extensors): 5  L4 (ankle dorsiflexors): 5  L5 (great toe extension): 5  S1 (ankle plantar flexors): 5    Dermatome testing   Lumbar (left)   All left lumbar dermatomes intact    Lumbar (right)   All right lumbar dermatomes intact    Additional Neurological Details  Will attempt to complete a more comprehensive neruo exam at next visit.   Eval limited due to minor management of a wound on the top of the patient's head and elbow, from moving around in chair/car.   Edu provided on how to care with a dressing non stick.     Movement does not follow any rhythmic pattern and is random involving the whole body  Speed of movement if extremely fast / high amplitude and does not follow any patten  Duration  of muscle contraction is intermittent however, they occur so frequently can be considered constant  Duration of movement is random  Body distrubution: total body is affected from facial expressions to toe curling  Body state impact: Patient was able to decrease frequency and amplitude of movement with a volentrary movement however, this was limited to less than 4 seconds; unknown if due to dyskinsia or change of focus.   Movement was involuntary.             Active Range of Motion     Lumbar   Flexion: within functional limits  Extension: decreased    Strength:      Left Hip   Planes of Motion   Abduction: 5  Adduction: 4    Right Hip   Planes of Motion   Abduction: 5  Adduction: 4    Tests     Left Hip   SLR: Negative.     Right Hip   SLR: Negative.         Therapeutic Exercises (CPT 27695):     1. Thoracic ball squeeze, x20sec 4-10x day    2. Supine rest position, x2min 4-5x day    3. Hands on knees to control movement, x3min or as long as able      Time-based treatments/modalities:    Physical Therapy Timed Treatment Charges  Therapeutic exercise minutes (CPT 95340): 10 minutes      Assessment, Response and Plan:   Impairments: abnormal coordination, abnormal gait, abnormal or restricted ROM, activity intolerance, difficulty performing job, impaired physical strength and pain with function    Assessment details:  Patient presents with signs and symptoms consistent with lower back pain due to a movement disorder. Patient limitations include weakness, decreased ROM, and pain. Patient demonstrated uncontrolled and random movements. Patient will benefit from consultation from a neuro specialty, asap, to assist with movement control to decrease risk of harming himself.   Prognosis: poor    Goals:   Short Term Goals:   1) Patient's will be able to attend to task >5 seconds to assist with control of random movements. 2  ) Patient will be able to hold thoracic stretch for >15sec to assist with decreasing symptoms.     Short term goal time span:  2-4 weeks      Long Term Goals:    1) Patient's symptoms will improve to allow for control of movement for 40secs  \  Long term goal time span:  6-8 weeks    Plan:   Therapy options:  Physical therapy treatment to continue  Planned therapy interventions:  E Stim Unattended (CPT 10267), Manual Therapy (CPT 74990), Neuromuscular Re-education (CPT 87075), Therapeutic Exercise (CPT 35134) and Hot or Cold Pack Therapy (CPT 32050)  Frequency:  2x week  Duration in weeks:  8  Discussed with:  Patient      Functional Assessment Used  PT Functional Assessment Tool Used: Low Back Disability Questionnaire (Revised Oswestry)  PT Functional Assessment Score: 24/100     Referring provider co-signature:  I have reviewed this plan of care and my co-signature certifies the need for services.    Certification Period: 09/27/2024 to  11/22/24    Physician Signature: ________________________________ Date: ______________

## 2024-10-02 ENCOUNTER — TELEPHONE (OUTPATIENT)
Dept: HEALTH INFORMATION MANAGEMENT | Facility: OTHER | Age: 39
End: 2024-10-02
Payer: MEDICARE

## 2024-10-04 ENCOUNTER — APPOINTMENT (OUTPATIENT)
Dept: PHYSICAL THERAPY | Facility: REHABILITATION | Age: 39
End: 2024-10-04
Attending: INTERNAL MEDICINE
Payer: MEDICARE

## 2024-10-04 ENCOUNTER — OFFICE VISIT (OUTPATIENT)
Dept: NEUROLOGY | Facility: MEDICAL CENTER | Age: 39
End: 2024-10-04
Attending: INTERNAL MEDICINE
Payer: MEDICARE

## 2024-10-04 VITALS
TEMPERATURE: 97.6 F | BODY MASS INDEX: 20.93 KG/M2 | DIASTOLIC BLOOD PRESSURE: 72 MMHG | HEART RATE: 96 BPM | RESPIRATION RATE: 18 BRPM | SYSTOLIC BLOOD PRESSURE: 104 MMHG | OXYGEN SATURATION: 97 % | HEIGHT: 69 IN | WEIGHT: 141.31 LBS

## 2024-10-04 DIAGNOSIS — R25.9 ABNORMAL MOVEMENTS: ICD-10-CM

## 2024-10-04 DIAGNOSIS — G25.5 CHOREA: ICD-10-CM

## 2024-10-04 PROCEDURE — 99212 OFFICE O/P EST SF 10 MIN: CPT | Performed by: INTERNAL MEDICINE

## 2024-10-04 PROCEDURE — G2211 COMPLEX E/M VISIT ADD ON: HCPCS | Performed by: INTERNAL MEDICINE

## 2024-10-04 PROCEDURE — 99205 OFFICE O/P NEW HI 60 MIN: CPT | Performed by: INTERNAL MEDICINE

## 2024-10-04 PROCEDURE — 3078F DIAST BP <80 MM HG: CPT | Performed by: INTERNAL MEDICINE

## 2024-10-04 PROCEDURE — 3074F SYST BP LT 130 MM HG: CPT | Performed by: INTERNAL MEDICINE

## 2024-10-04 RX ORDER — TRIHEXYPHENIDYL HYDROCHLORIDE 2 MG/1
2 TABLET ORAL 3 TIMES DAILY
Qty: 270 TABLET | Refills: 3 | Status: SHIPPED | OUTPATIENT
Start: 2024-10-04 | End: 2024-10-08 | Stop reason: SDUPTHER

## 2024-10-04 ASSESSMENT — PATIENT HEALTH QUESTIONNAIRE - PHQ9
SUM OF ALL RESPONSES TO PHQ QUESTIONS 1-9: 4
CLINICAL INTERPRETATION OF PHQ2 SCORE: 1
5. POOR APPETITE OR OVEREATING: 0 - NOT AT ALL

## 2024-10-04 ASSESSMENT — FIBROSIS 4 INDEX: FIB4 SCORE: 0.89

## 2024-10-08 RX ORDER — TRIHEXYPHENIDYL HYDROCHLORIDE 2 MG/1
2 TABLET ORAL 3 TIMES DAILY
Qty: 270 TABLET | Refills: 3 | Status: SHIPPED | OUTPATIENT
Start: 2024-10-08 | End: 2025-10-03

## 2024-10-09 ENCOUNTER — APPOINTMENT (OUTPATIENT)
Dept: PHYSICAL THERAPY | Facility: REHABILITATION | Age: 39
End: 2024-10-09
Attending: INTERNAL MEDICINE
Payer: MEDICARE

## 2024-10-09 PROBLEM — R25.9 ABNORMAL MOVEMENTS: Status: ACTIVE | Noted: 2024-10-09

## 2024-10-11 ENCOUNTER — APPOINTMENT (OUTPATIENT)
Dept: PHYSICAL THERAPY | Facility: REHABILITATION | Age: 39
End: 2024-10-11
Attending: INTERNAL MEDICINE
Payer: MEDICARE

## 2024-10-16 ENCOUNTER — APPOINTMENT (OUTPATIENT)
Dept: FAMILY PLANNING/WOMEN'S HEALTH CLINIC | Facility: PHYSICIAN GROUP | Age: 39
End: 2024-10-16
Payer: MEDICARE

## 2024-10-16 ENCOUNTER — APPOINTMENT (OUTPATIENT)
Dept: PHYSICAL THERAPY | Facility: REHABILITATION | Age: 39
End: 2024-10-16
Attending: INTERNAL MEDICINE
Payer: MEDICARE

## 2024-10-16 DIAGNOSIS — F39 MOOD DISORDER (HCC): ICD-10-CM

## 2024-10-16 DIAGNOSIS — F33.0 MILD EPISODE OF RECURRENT MAJOR DEPRESSIVE DISORDER (HCC): Chronic | ICD-10-CM

## 2024-10-16 DIAGNOSIS — F90.2 ATTENTION DEFICIT HYPERACTIVITY DISORDER (ADHD), COMBINED TYPE: ICD-10-CM

## 2024-10-23 ENCOUNTER — APPOINTMENT (OUTPATIENT)
Dept: PHYSICAL THERAPY | Facility: REHABILITATION | Age: 39
End: 2024-10-23
Attending: INTERNAL MEDICINE
Payer: MEDICARE

## 2024-10-28 ENCOUNTER — HOSPITAL ENCOUNTER (OUTPATIENT)
Dept: LAB | Facility: MEDICAL CENTER | Age: 39
End: 2024-10-28
Attending: INTERNAL MEDICINE
Payer: MEDICARE

## 2024-10-28 DIAGNOSIS — G25.5 CHOREA: ICD-10-CM

## 2024-10-28 LAB
BASOPHILS # BLD AUTO: 0.7 % (ref 0–1.8)
BASOPHILS # BLD: 0.06 K/UL (ref 0–0.12)
EOSINOPHIL # BLD AUTO: 0.1 K/UL (ref 0–0.51)
EOSINOPHIL NFR BLD: 1.1 % (ref 0–6.9)
ERYTHROCYTE [DISTWIDTH] IN BLOOD BY AUTOMATED COUNT: 38.2 FL (ref 35.9–50)
HCT VFR BLD AUTO: 44 % (ref 42–52)
HGB BLD-MCNC: 14.4 G/DL (ref 14–18)
IMM GRANULOCYTES # BLD AUTO: 0.03 K/UL (ref 0–0.11)
IMM GRANULOCYTES NFR BLD AUTO: 0.3 % (ref 0–0.9)
LYMPHOCYTES # BLD AUTO: 1.5 K/UL (ref 1–4.8)
LYMPHOCYTES NFR BLD: 17.2 % (ref 22–41)
MCH RBC QN AUTO: 27.9 PG (ref 27–33)
MCHC RBC AUTO-ENTMCNC: 32.7 G/DL (ref 32.3–36.5)
MCV RBC AUTO: 85.1 FL (ref 81.4–97.8)
MONOCYTES # BLD AUTO: 0.66 K/UL (ref 0–0.85)
MONOCYTES NFR BLD AUTO: 7.6 % (ref 0–13.4)
NEUTROPHILS # BLD AUTO: 6.36 K/UL (ref 1.82–7.42)
NEUTROPHILS NFR BLD: 73.1 % (ref 44–72)
NRBC # BLD AUTO: 0 K/UL
NRBC BLD-RTO: 0 /100 WBC (ref 0–0.2)
PLATELET # BLD AUTO: 222 K/UL (ref 164–446)
PMV BLD AUTO: 11.7 FL (ref 9–12.9)
RBC # BLD AUTO: 5.17 M/UL (ref 4.7–6.1)
WBC # BLD AUTO: 8.7 K/UL (ref 4.8–10.8)

## 2024-10-28 PROCEDURE — 86431 RHEUMATOID FACTOR QUANT: CPT

## 2024-10-28 PROCEDURE — 86160 COMPLEMENT ANTIGEN: CPT

## 2024-10-28 PROCEDURE — 82390 ASSAY OF CERULOPLASMIN: CPT

## 2024-10-28 PROCEDURE — 36415 COLL VENOUS BLD VENIPUNCTURE: CPT

## 2024-10-28 PROCEDURE — 85025 COMPLETE CBC W/AUTO DIFF WBC: CPT

## 2024-10-28 PROCEDURE — 86060 ANTISTREPTOLYSIN O TITER: CPT

## 2024-10-28 PROCEDURE — 86038 ANTINUCLEAR ANTIBODIES: CPT

## 2024-10-30 ENCOUNTER — APPOINTMENT (OUTPATIENT)
Dept: PHYSICAL THERAPY | Facility: REHABILITATION | Age: 39
End: 2024-10-30
Attending: INTERNAL MEDICINE
Payer: MEDICARE

## 2024-10-30 LAB
ASO AB SERPL-ACNC: 250 IU/ML
C3 SERPL-MCNC: 106 MG/DL (ref 90–180)
C4 SERPL-MCNC: 32 MG/DL (ref 10–40)
CERULOPLASMIN SERPL-MCNC: 32 MG/DL (ref 15–30)
NUCLEAR IGG SER QL IA: NORMAL
RHEUMATOID FACT SER NEPH-ACNC: <10 IU/ML (ref 0–14)

## 2024-11-06 ENCOUNTER — APPOINTMENT (OUTPATIENT)
Dept: PHYSICAL THERAPY | Facility: REHABILITATION | Age: 39
End: 2024-11-06
Attending: INTERNAL MEDICINE
Payer: MEDICARE

## 2024-11-10 LAB — TEST NAME 95000: NORMAL

## 2024-11-14 ENCOUNTER — OFFICE VISIT (OUTPATIENT)
Dept: MEDICAL GROUP | Facility: PHYSICIAN GROUP | Age: 39
End: 2024-11-14
Payer: MEDICARE

## 2024-11-14 VITALS
TEMPERATURE: 97.5 F | HEIGHT: 69 IN | DIASTOLIC BLOOD PRESSURE: 68 MMHG | OXYGEN SATURATION: 98 % | HEART RATE: 92 BPM | WEIGHT: 138.6 LBS | BODY MASS INDEX: 20.53 KG/M2 | SYSTOLIC BLOOD PRESSURE: 100 MMHG

## 2024-11-14 DIAGNOSIS — G24.01 TARDIVE DYSKINESIA: Chronic | ICD-10-CM

## 2024-11-14 DIAGNOSIS — F39 MOOD DISORDER (HCC): ICD-10-CM

## 2024-11-14 DIAGNOSIS — F90.2 ATTENTION DEFICIT HYPERACTIVITY DISORDER (ADHD), COMBINED TYPE: ICD-10-CM

## 2024-11-14 DIAGNOSIS — Z02.9 ADMINISTRATIVE ENCOUNTER: ICD-10-CM

## 2024-11-14 DIAGNOSIS — F33.0 MILD EPISODE OF RECURRENT MAJOR DEPRESSIVE DISORDER (HCC): Chronic | ICD-10-CM

## 2024-11-14 PROBLEM — R73.9 HYPERGLYCEMIA: Chronic | Status: RESOLVED | Noted: 2023-11-16 | Resolved: 2024-11-14

## 2024-11-14 PROCEDURE — 99214 OFFICE O/P EST MOD 30 MIN: CPT | Performed by: INTERNAL MEDICINE

## 2024-11-14 PROCEDURE — 3078F DIAST BP <80 MM HG: CPT | Performed by: INTERNAL MEDICINE

## 2024-11-14 PROCEDURE — 3074F SYST BP LT 130 MM HG: CPT | Performed by: INTERNAL MEDICINE

## 2024-11-14 ASSESSMENT — FIBROSIS 4 INDEX: FIB4 SCORE: 0.96

## 2024-11-14 NOTE — PROGRESS NOTES
PRIMARY CARE CLINIC VISIT        Chief Complaint   Patient presents with    Follow-Up     Lab results.      Follow-up tardive dyskinesia  Attention deficit disorder  Mood disorder  dePression  Fill out medical form        History of Present Illness     Mild episode of recurrent major depressive disorder (HCC)  Chronic condition.  Patient followed by private psychiatrist.  Patient no longer taking mirtazapine or Ingrezza.  Symptoms are well-controlled per patient denies SI.    Tardive dyskinesia  This is a chronic condition.  The patient has been followed by a neurologist.  There was a concern that the patient may have dystonia rather than tardive dyskinesia.  Patient has pending appointment to follow-up with neurologist.    Patient presently taking Artane 2 mg 3 times a day.    Attention deficit hyperactivity disorder (ADHD), combined type  Chronic condition.  Patient followed by private psychiatrist.  Patient currently not on Ritalin.  Symptoms are well-controlled per patient report    Mood disorder (HCC)  Chronic condition.  Followed by psychiatrist.  Patient currently not on therapy.  Symptoms are well-controlled.    Administrative encounter  Patient and caregiver requesting physical examination form to be filled out in the office today.    Current Outpatient Medications on File Prior to Visit   Medication Sig Dispense Refill    trihexyphenidyl (ARTANE) 2 MG Tab Take 1 Tablet by mouth 3 times a day for 360 days. For abnormal movements. For the first week: take half tablet, 3 times a day. 270 Tablet 3    ferrous sulfate (FEROSUL) 325 (65 Fe) MG tablet TAKE 1 TABLET BY MOUTH EVERY MONDAY, WEDNESDAY AND FRIDAYS 36 Tablet 3    amantadine (SYMMETREL) 100 MG Tab  (Patient not taking: Reported on 11/14/2024)      Valbenazine Tosylate (INGREZZA) 80 MG Cap Take 80 mg by mouth. (Patient not taking: Reported on 11/14/2024)      mirtazapine (REMERON) 30 MG Tab tablet  (Patient not taking: Reported on 11/14/2024)       hydrOXYzine pamoate (VISTARIL) 50 MG Cap Take 50 mg by mouth 2 times a day. (Patient not taking: Reported on 11/14/2024)      cloNIDine (CATAPRES) 0.1 MG Tab Take 0.1 mg by mouth 2 times a day. Taking once daily (Patient not taking: Reported on 11/14/2024)      propranolol (INDERAL) 20 MG Tab Take 20 mg by mouth 2 times a day. (Patient not taking: Reported on 11/14/2024)       No current facility-administered medications on file prior to visit.        Allergies: Penicillins    Current Outpatient Medications Ordered in Epic   Medication Sig Dispense Refill    trihexyphenidyl (ARTANE) 2 MG Tab Take 1 Tablet by mouth 3 times a day for 360 days. For abnormal movements. For the first week: take half tablet, 3 times a day. 270 Tablet 3    ferrous sulfate (FEROSUL) 325 (65 Fe) MG tablet TAKE 1 TABLET BY MOUTH EVERY MONDAY, WEDNESDAY AND FRIDAYS 36 Tablet 3    amantadine (SYMMETREL) 100 MG Tab  (Patient not taking: Reported on 11/14/2024)      Valbenazine Tosylate (INGREZZA) 80 MG Cap Take 80 mg by mouth. (Patient not taking: Reported on 11/14/2024)      mirtazapine (REMERON) 30 MG Tab tablet  (Patient not taking: Reported on 11/14/2024)      hydrOXYzine pamoate (VISTARIL) 50 MG Cap Take 50 mg by mouth 2 times a day. (Patient not taking: Reported on 11/14/2024)      cloNIDine (CATAPRES) 0.1 MG Tab Take 0.1 mg by mouth 2 times a day. Taking once daily (Patient not taking: Reported on 11/14/2024)      propranolol (INDERAL) 20 MG Tab Take 20 mg by mouth 2 times a day. (Patient not taking: Reported on 11/14/2024)       No current [facility]-ordered facility-administered medications on file.       Past Medical History:   Diagnosis Date    ADHD (attention deficit hyperactivity disorder)     Psychiatric problem     Mild Mental Retardation    Tardive dyskinesia        Past Surgical History:   Procedure Laterality Date    BLAIR RECTAL ABSCESS INCISION AND DRAINAGE N/A 2/26/2016    Procedure: BLAIR RECTAL ABSCESS INCISION AND DRAINAGE;   "Surgeon: Shanna Lindo M.D.;  Location: SURGERY [facility];  Service:        Family History   Problem Relation Age of Onset    Bipolar disorder Brother     Other Brother        Social History     Tobacco Use   Smoking Status Former    Current packs/day: 0.25    Average packs/day: 0.3 packs/day for 18.7 years (4.7 ttl pk-yrs)    Types: Cigarettes    Start date: 2/27/2006   Smokeless Tobacco Never       Social History     Substance and Sexual Activity   Alcohol Use Not Currently    Comment: occasionally       Review of systems  As per HPI above. All other systems reviewed and negative.      Past Medical, Social, and Family history reviewed and updated in [facility]       LAB DATA:     I have independently reviewed / interpreted labs    Lab Results   Component Value Date/Time    HBA1C 5.1 05/16/2024 08:03 AM    HBA1C 5.1 11/16/2023 07:54 AM    HBA1C 5.4 03/27/2018 08:04 AM        Lab Results   Component Value Date/Time    WBC 8.7 10/28/2024 10:56 AM    HEMOGLOBIN 14.4 10/28/2024 10:56 AM    HEMATOCRIT 44.0 10/28/2024 10:56 AM    MCV 85.1 10/28/2024 10:56 AM    PLATELETCT 222 10/28/2024 10:56 AM       Lab Results   Component Value Date/Time    SODIUM 137 05/16/2024 08:03 AM    POTASSIUM 4.2 05/16/2024 08:03 AM    GLUCOSE 93 05/16/2024 08:03 AM    BUN 18 05/16/2024 08:03 AM    CREATININE 0.53 05/16/2024 08:03 AM       Lab Results   Component Value Date/Time    CHOLSTRLTOT 116 05/16/2024 08:03 AM    TRIGLYCERIDE 57 05/16/2024 08:03 AM    HDL 48 05/16/2024 08:03 AM    LDL 57 05/16/2024 08:03 AM       Lab Results   Component Value Date/Time    ALTSGPT 26 03/20/2024 09:25 AM          Objective     /68 (BP Location: Right arm, Patient Position: Sitting, BP Cuff Size: Adult)   Pulse 92   Temp 36.4 °C (97.5 °F) (Temporal)   Ht 1.753 m (5' 9\")   Wt 62.9 kg (138 lb 9.6 oz)   SpO2 98%    Body mass index is 20.47 kg/m².    General: alert in no apparent distress.  Cardiovascular: regular rate and rhythm  Pulmonary: " lungs : no wheezing   Gastrointestinal: BS present.       Assessment and Plan     1. Mild episode of recurrent major depressive disorder (HCC)  Chronic stable condition.  The patient no longer takes antidepressant.  Symptoms are well-controlled.  Continue follow-up with psychiatrist as directed    2. Tardive dyskinesia  Chronic condition.  Stable.  Patient presently taking Artane 2 mg 3 times a day.  Patient has pending appointment to follow-up with neurologist.    3. Attention deficit hyperactivity disorder (ADHD), combined type  Chronic stable condition.  Currently not on therapy.  Symptoms are well-controlled.  Continue follow-up with psychiatrist    4. Mood disorder (HCC)  Chronic stable.  Currently not on therapy.  Stable.  Continue follow-up with psychiatrist as directed    5. Administrative encounter  Today I have filled out the medical forms for the patient today.                    Please note that this dictation was created using voice recognition software. I have made every reasonable attempt to correct obvious errors, but I expect that there are errors of grammar and possibly content that I did not discover before finalizing the note.    Mk Yanez MD  Internal Medicine  [facility]

## 2024-11-14 NOTE — ASSESSMENT & PLAN NOTE
Chronic condition.  Followed by psychiatrist.  Patient currently not on therapy.  Symptoms are well-controlled.

## 2024-11-14 NOTE — ASSESSMENT & PLAN NOTE
This is a chronic condition.  The patient has been followed by a neurologist.  There was a concern that the patient may have dystonia rather than tardive dyskinesia.  Patient has pending appointment to follow-up with neurologist.    Patient presently taking Artane 2 mg 3 times a day.

## 2024-11-14 NOTE — ASSESSMENT & PLAN NOTE
Chronic condition.  Patient followed by private psychiatrist.  Patient no longer taking mirtazapine or Ingrezza.  Symptoms are well-controlled per patient denies SI.

## 2024-11-14 NOTE — ASSESSMENT & PLAN NOTE
Chronic condition.  Patient followed by private psychiatrist.  Patient currently not on Ritalin.  Symptoms are well-controlled per patient report

## 2024-11-20 ENCOUNTER — OFFICE VISIT (OUTPATIENT)
Dept: NEUROLOGY | Facility: MEDICAL CENTER | Age: 39
End: 2024-11-20
Attending: INTERNAL MEDICINE
Payer: MEDICARE

## 2024-11-20 VITALS
SYSTOLIC BLOOD PRESSURE: 104 MMHG | TEMPERATURE: 97.4 F | RESPIRATION RATE: 16 BRPM | DIASTOLIC BLOOD PRESSURE: 70 MMHG | WEIGHT: 135.8 LBS | BODY MASS INDEX: 20.11 KG/M2 | HEART RATE: 119 BPM | HEIGHT: 69 IN | OXYGEN SATURATION: 96 %

## 2024-11-20 DIAGNOSIS — F90.2 ATTENTION DEFICIT HYPERACTIVITY DISORDER (ADHD), COMBINED TYPE: ICD-10-CM

## 2024-11-20 DIAGNOSIS — F39 MOOD DISORDER (HCC): ICD-10-CM

## 2024-11-20 PROCEDURE — G2211 COMPLEX E/M VISIT ADD ON: HCPCS | Performed by: INTERNAL MEDICINE

## 2024-11-20 PROCEDURE — 3078F DIAST BP <80 MM HG: CPT | Performed by: INTERNAL MEDICINE

## 2024-11-20 PROCEDURE — 99212 OFFICE O/P EST SF 10 MIN: CPT | Performed by: INTERNAL MEDICINE

## 2024-11-20 PROCEDURE — 3074F SYST BP LT 130 MM HG: CPT | Performed by: INTERNAL MEDICINE

## 2024-11-20 PROCEDURE — 99215 OFFICE O/P EST HI 40 MIN: CPT | Performed by: INTERNAL MEDICINE

## 2024-11-20 RX ORDER — CLONIDINE HYDROCHLORIDE 0.1 MG/1
0.1 TABLET ORAL 2 TIMES DAILY
Qty: 180 TABLET | Refills: 0 | Status: SHIPPED | OUTPATIENT
Start: 2024-11-20 | End: 2025-02-18

## 2024-11-20 RX ORDER — HYDROXYZINE PAMOATE 50 MG/1
100 CAPSULE ORAL NIGHTLY
Qty: 180 CAPSULE | Refills: 0 | Status: SHIPPED | OUTPATIENT
Start: 2024-11-20 | End: 2025-02-18

## 2024-11-20 RX ORDER — PROPRANOLOL HCL 20 MG
20 TABLET ORAL 2 TIMES DAILY
Qty: 180 TABLET | Refills: 0 | Status: SHIPPED | OUTPATIENT
Start: 2024-11-20 | End: 2025-02-18

## 2024-11-20 RX ORDER — MIRTAZAPINE 30 MG/1
30 TABLET, FILM COATED ORAL NIGHTLY
Qty: 90 TABLET | Refills: 0 | Status: SHIPPED | OUTPATIENT
Start: 2024-11-20 | End: 2025-02-18

## 2024-11-20 ASSESSMENT — FIBROSIS 4 INDEX: FIB4 SCORE: 0.96

## 2024-11-20 NOTE — PROGRESS NOTES
Mayank Swan,   Neurology, Movement Disorders [facility]  [address], [address]. [place] [address]  Phone: [phone], Fax: [phone]     ASSESSMENT / PLAN   Leodan Hyatt is a 39 y.o. male presenting for abnormal movements    Hyperkinetic movement disorder  Edentulous dyskinesias  Onset in  with abrupt worsening after COVID vaccination. Movements started in face (but also has edentulous dyskinesias). Then it spread to rest of face, cervical spine, and some involvement in left more than right arms. None in legs. There is improvement when focusing on task, but worse when at rest.     Hx of ADHD on stimulant therapy. For this reason ddx includes complex motor tics, which have worsened since stopping the dopamine blocking agents and stimulants. He also reports an urge to perform movement, and improvement in sensation after the movements are completed.     Less consistent with cervical dystonia: there is improvement when distracted with tasks, and acute onset within a week. No null position, nor sensory trick. Improves when supine, which is not expected.     Ddx also includes tardive syndrome but seems less likely given the timeline of the progression while on stable medication dose. The face and neck movements are too stereotyped to be considered a choreiform movement. No improvement with VMAT2 inhibitor either.    Normal MRI Brain w/o 2022  Video recording consent obtained    - Trihexyphenidyl 2m tab TID  - consider slowly restarting stimulant medication first to treat ADHD. Then consider reintroducing dopamine blocking agents. In case this is complex motor tic, treating ADHD and a dopamine blocking agent can help.  - Check copper serum levels at next blood draw  - pending notes from psychiatry, CINDY sent 10/2024      Orders Placed This Encounter    cloNIDine (CATAPRES) 0.1 MG Tab    propranolol (INDERAL) 20 MG Tab    hydrOXYzine pamoate (VISTARIL) 50 MG Cap     mirtazapine (REMERON) 30 MG Tab tablet     Return in about 2 months (around 1/20/2025).    BILLING DOCUMENTATION:   Excluding time spent on procedures during visit, I spent 40 minutes reviewing the medical record, interviewing and examining the patient, discussing diagnosis and treatment, and coordinating care.            HISTORY OF PRESENT ILLNESS   Leodan Hyatt is a 39 y.o. male presenting for abnormal movements     PMHx: mild intellectual disability, unspecified psychotic disorder,   unspecified depressive disorder, tardive dyskinesia, ADHD    group home since he was 18 years old. His grandmother is his legal guardian. He also has a .     Initial HPI 10/04/24  Abnormal movements ongoing for the past 3 years (2021). Never had COVID. COVID vaccine, then started to have it 6 weeks later. Started in tongue movements (protrusion), but also does not have teeth or dentures.     Progressively worsened since then. Can't walk down [place]. Head is bald from constant head movements. Constant upper extremitiy movement. Movement is improved with distraction or tasks where is focused on something. Worse when watching TV. Couldn't sleep, trouble talking due to constant movement of face an mouth. Movements resolve when asleep. No dysphagia when eating.     [facility], with Dr. Caraballo.   [address] [place] [address] [address], [place] [address]  P: [phone]  F: [phone]  Stopped medications that contribute: seroquel, stimulants    No new HA, vision changes, weakness.     Previous medications per chart review (none from psychiatry at this time):  Seroquel 25 mg p.o. nightly for psychosis and agitation  Ingrezza 80 mg p.o. daily for tardive dyskinesia  Trazodone 50 mg p.o. nightly for insomnia  Propanolol 10 mg p.o. twice daily for anxiety  Klonopin 2 mg p.o. twice daily as needed     Possible seizure history: 1x/year. Not on any meds. Fall to ground, stiff and quiet. Lasts a few minutes when he gets back  up. Doesn't recall what happened during fall.     Interval history  10/04/24: first visit with me. Started trihexyphenidyl 2mg TID  found to be effective until a few weeks ago when he ran out of his other medications (propranolol, clonidine, hydroxyzine, mirtazapine)  11/20/24: no med changes.      Past Medical History:   Diagnosis Date    ADHD (attention deficit hyperactivity disorder)     Psychiatric problem     Mild Mental Retardation    Tardive dyskinesia      Past Surgical History:   Procedure Laterality Date    BLAIR RECTAL ABSCESS INCISION AND DRAINAGE N/A 2/26/2016    Procedure: BLAIR RECTAL ABSCESS INCISION AND DRAINAGE;  Surgeon: Shanna Lindo M.D.;  [facility]GERY [facility];  Service:      Family History   Problem Relation Age of Onset    Bipolar disorder Brother     Other Brother      Social History     Socioeconomic History    Marital status: Single     Spouse name: Not on file    Number of children: Not on file    Years of education: Not on file    Highest education level: Not on file   Occupational History    Not on file   Tobacco Use    Smoking status: Former     Current packs/day: 0.25     Average packs/day: 0.3 packs/day for 18.7 years (4.7 ttl pk-yrs)     Types: Cigarettes     Start date: 2/27/2006    Smokeless tobacco: Never   Vaping Use    Vaping status: Former   Substance and Sexual Activity    Alcohol use: Not Currently     Comment: occasionally    Drug use: Not Currently     Types: Inhaled, Oral     Comment: marijuana occasionally    Sexual activity: Not on file   Other Topics Concern    Not on file   Social History Narrative    Not on file     Social Drivers of Health     Financial Resource Strain: Not on file   Food Insecurity: Not on file   Transportation Needs: Not on file   Physical Activity: Not on file   Stress: Not on file   Social Connections: Not on file   Intimate Partner Violence: Not on file   Housing Stability: Not on file     Current Outpatient Medications    Medication    cloNIDine (CATAPRES) 0.1 MG Tab    propranolol (INDERAL) 20 MG Tab    hydrOXYzine pamoate (VISTARIL) 50 MG Cap    mirtazapine (REMERON) 30 MG Tab tablet    trihexyphenidyl (ARTANE) 2 MG Tab    ferrous sulfate (FEROSUL) 325 (65 Fe) MG tablet    Valbenazine Tosylate (INGREZZA) 80 MG Cap     No current facility-administered medications for this visit.     Allergies   Allergen Reactions    Penicillins              DATA / RESULTS     MRI Brain w/o 1/2022:  No acute intracranial process. Nonspecific T2 hyperintensities are noted in the periventricular and deep white matter, most likely related to chronic microvascular ischemia.    11/11/24: Ceruloplasmin and GAD65 are just borderline, not clinically significant.    Latest Reference Range & Units 10/28/24 10:56   WBC 4.8 - 10.8 K/uL 8.7   RBC 4.70 - 6.10 M/uL 5.17   Hemoglobin 14.0 - 18.0 g/dL 14.4   Hematocrit 42.0 - 52.0 % 44.0   MCV 81.4 - 97.8 fL 85.1   MCH 27.0 - 33.0 pg 27.9   MCHC 32.3 - 36.5 g/dL 32.7   RDW 35.9 - 50.0 fL 38.2   Platelet Count 164 - 446 K/uL 222   MPV 9.0 - 12.9 fL 11.7   Neutrophils-Polys 44.00 - 72.00 % 73.10 (H)   Neutrophils (Absolute) 1.82 - 7.42 K/uL 6.36   Lymphocytes 22.00 - 41.00 % 17.20 (L)   Lymphs (Absolute) 1.00 - 4.80 K/uL 1.50   Monocytes 0.00 - 13.40 % 7.60   Monos (Absolute) 0.00 - 0.85 K/uL 0.66   Eosinophils 0.00 - 6.90 % 1.10   Eos (Absolute) 0.00 - 0.51 K/uL 0.10   Basophils 0.00 - 1.80 % 0.70   Baso (Absolute) 0.00 - 0.12 K/uL 0.06   Immature Granulocytes 0.00 - 0.90 % 0.30   Immature Granulocytes (abs) 0.00 - 0.11 K/uL 0.03   Nucleated RBC 0.00 - 0.20 /100 WBC 0.00   NRBC (Absolute) K/uL 0.00   Miscellaneous Lab Result     C3 Complement 90 - 180 mg/dL 106        Ceruloplasmin 15 - 30 mg/dL 32 (H)   Complement C4 10 - 40 mg/dL 32   Rheumatoid Factor -Neph- 0 - 14 IU/mL <10   Antistreptolysin O Titer <=330 IU/mL 250   Antinuclear Antibody None Detected  None Detected     [facility] Movement Disorder,  Autoimmune/Paraneoplastic Evaluation, Serum   Movement, Autoimm/Paraneo, S. Movement Disorder Inter, S   The following antibody was identified: Glutamic Acid Decarboxylase.   * This profile is consistent with predisposition to thyrogastric   disorders, including thyroiditis, pernicious anemia, and type 1   diabetes, but has low specificity for autoimmune encephalopathy.   GAD65 antibody values less than 2.00 nM have a lower positive   predictive value for neurological autoimmunity than values of 20.0   nM and higher.*   ------------------------------------------------------------   IFA Notes   None.   ------------------------------------------------------------   Result Name               Result    Unit    Reference Value   ------------------------------------------------------------   High!! GAD65 Ab Assay, S  0.10      nmol/L  <=0.02     [1]   ------------------------------------------------------------   AMPA-R Ab CBA, S          Negative          Negative   [1]   ------------------------------------------------------------   Amphiphysin Ab, S         Negative          Negative   [1]   ------------------------------------------------------------   AGNA-1, S                 Negative          Negative   [1]   ------------------------------------------------------------   JUSTIN-1, S                 Negative          Negative   [1]   ------------------------------------------------------------   JUSTIN-2, S                 Negative          Negative   [1]   ------------------------------------------------------------   JUSTIN-3, S                 Negative          Negative   [1]   ------------------------------------------------------------   AP3B2 IFA, S              Negative          Negative   [1]   ------------------------------------------------------------   CASPR2-IgG CBA, S         Negative          Negative   [1]   ------------------------------------------------------------   CRMP-5-IgG Western Blot, S    Negative          Negative   [1]   ------------------------------------------------------------   DPPX Ab CBA, S            Negative          Negative   [1]   ------------------------------------------------------------   MICHELLE-B-R Ab CBA, S        Negative          Negative   [1]   ------------------------------------------------------------   GFAP IFA, S               Negative          Negative   [1]   ------------------------------------------------------------   GRAF1 IFA, S              Negative          Negative   [1]   ------------------------------------------------------------   IgLON5 CBA, S             Negative          Negative   [1]   ------------------------------------------------------------   ITPR1 IFA, S              Negative          Negative   [1]   ------------------------------------------------------------   KLHL11 Ab CBA, S          Negative          Negative   [1]   ------------------------------------------------------------   LGI1-IgG CBA, S           Negative          Negative   [1]   ------------------------------------------------------------   mGluR1 Ab IFA, S          Negative          Negative   [1]   ------------------------------------------------------------   Neurochondrin IFA, S      Negative          Negative   [1]   ------------------------------------------------------------   NIF IFA, S                Negative          Negative   [1]   ------------------------------------------------------------   NMDA-R Ab CBA, S          Negative          Negative   [1]   ------------------------------------------------------------   P/Q-Type Calcium Channel Ab   0.00      nmol/L  <=0.02     [1]   ------------------------------------------------------------   PCA-1, S                  Negative          Negative   [1]   ------------------------------------------------------------   PCA-2, S                  Negative          Negative   [1]    ------------------------------------------------------------   PCA-Tr, S                 Negative          Negative   [1]   ------------------------------------------------------------   PDE10A Ab IFA, S          Negative          Negative   [1]   ------------------------------------------------------------   Septin-5 IFA, S           Negative          Negative   [1]   ------------------------------------------------------------   Septin-7 IFA, S           Negative          Negative   [1]   ------------------------------------------------------------   TRIM46 Ab IFA, S          Negative          Negative   [1]     25-Hydroxy   Vitamin D 25   Date Value Ref Range Status   05/16/2024 22 (L) 30 - 100 ng/mL Final     Comment:     Adult Ranges:   <20 ng/mL - Deficiency  20-29 ng/mL - Insufficiency   ng/mL - Sufficiency  Electrochemiluminescence binding assay performed using Roche tacho e  immunoassay analyzer.  The [employer] Vitamin D total II assay is intended for  the quantitative determination of total 25 hydroxyvitamin D in human serum  and plasma. This assay is to be used as an aid in the assessment of vitamin  D sufficiency in adults.       Vitamin B12 -True Cobalamin   Date Value Ref Range Status   01/04/2024 654 211 - 911 pg/mL Final     TSH   Date Value Ref Range Status   11/16/2023 2.240 0.380 - 5.330 uIU/mL Final     Comment:     The 2011 American Thyroid Association (BRENDA) guidelines  recommended that the interpretation of thyroid function in  pregnancy be based on trimester specific reference ranges.    1st Trimester  0.100-2.500 mIU/L  2nd Trimester  0.200-3.000 mIU/L  3rd Trimester  0.300-3.500 mIU/L    These established reference ranges have not been validated  at [facility].       HIV 1/0/2   Date Value Ref Range Status   08/06/2012 see below Non Reactive Final     Comment:     Non Reactive:  Screen for Enhanced HIV 1/O/2 is NEGATIVE for  HIV-1, including group O, and HIV-2  "antibodies.  A negative  test result at any point in the investigation of individual  subjects does not preclude the possibility of exposure to or  infection with HIV 1/O/2.     Glycohemoglobin   Date Value Ref Range Status   05/16/2024 5.1 4.0 - 5.6 % Final     Comment:     Increased risk for diabetes:  5.7 -6.4%  Diabetes:  >6.4%  Glycemic control for adults with diabetes:  <7.0%    The above interpretations are per ADA guidelines.  Diagnosis  of diabetes mellitus on the basis of elevated Hemoglobin A1c  should be confirmed by repeating the Hb A1c test.       LDL   Date Value Ref Range Status   05/16/2024 57 <100 mg/dL Final                OBJECTIVE      Vitals:    11/20/24 0823   BP: 104/70   BP Location: Left arm   Patient Position: Sitting   BP Cuff Size: Adult   Pulse: (!) 119   Resp: 16   Temp: 36.3 °C (97.4 °F)   TempSrc: Temporal   SpO2: 96%   Weight: 61.6 kg (135 lb 12.9 oz)   Height: 1.753 m (5' 9\")       Physical Exam  General: constant movement, appears uncomfortable    Mental status: Speech slightly dysarthric. Fund of knowledge is good.      Abnormal movements:  Face: Repetitive eye closure, dyskinetic tongue and jaw movements  Neck: Repetitive left rotation, dystonic in appearance. Improved when supine. No rigidity with ROM testing.  Arms: Right arm greater than left arm choreiform movements. Improved when supine.  Abnormal movements improve to near zero aside from orolingual movement when performing rest of neurologic exam.  Abnormal movements reemerge when at rest or when walking.          PROCEDURE   N/A  "

## 2024-12-23 ENCOUNTER — TELEPHONE (OUTPATIENT)
Dept: NEUROLOGY | Facility: MEDICAL CENTER | Age: 39
End: 2024-12-23
Payer: MEDICARE

## 2024-12-31 ENCOUNTER — OFFICE VISIT (OUTPATIENT)
Dept: NEUROLOGY | Facility: MEDICAL CENTER | Age: 39
End: 2024-12-31
Attending: INTERNAL MEDICINE
Payer: MEDICARE

## 2024-12-31 VITALS
BODY MASS INDEX: 20.58 KG/M2 | RESPIRATION RATE: 16 BRPM | HEART RATE: 68 BPM | TEMPERATURE: 97.3 F | SYSTOLIC BLOOD PRESSURE: 116 MMHG | WEIGHT: 135.8 LBS | OXYGEN SATURATION: 68 % | DIASTOLIC BLOOD PRESSURE: 74 MMHG | HEIGHT: 68 IN

## 2024-12-31 DIAGNOSIS — F90.2 ATTENTION DEFICIT HYPERACTIVITY DISORDER (ADHD), COMBINED TYPE: ICD-10-CM

## 2024-12-31 DIAGNOSIS — G24.01 TARDIVE DYSKINESIA: Chronic | ICD-10-CM

## 2024-12-31 DIAGNOSIS — R25.9 ABNORMAL MOVEMENTS: ICD-10-CM

## 2024-12-31 PROCEDURE — 99417 PROLNG OP E/M EACH 15 MIN: CPT | Performed by: INTERNAL MEDICINE

## 2024-12-31 PROCEDURE — 99212 OFFICE O/P EST SF 10 MIN: CPT

## 2024-12-31 PROCEDURE — 3074F SYST BP LT 130 MM HG: CPT | Performed by: INTERNAL MEDICINE

## 2024-12-31 PROCEDURE — G2211 COMPLEX E/M VISIT ADD ON: HCPCS | Performed by: INTERNAL MEDICINE

## 2024-12-31 PROCEDURE — 99215 OFFICE O/P EST HI 40 MIN: CPT | Performed by: INTERNAL MEDICINE

## 2024-12-31 PROCEDURE — 3078F DIAST BP <80 MM HG: CPT | Performed by: INTERNAL MEDICINE

## 2024-12-31 ASSESSMENT — FIBROSIS 4 INDEX: FIB4 SCORE: 0.96

## 2024-12-31 ASSESSMENT — PATIENT HEALTH QUESTIONNAIRE - PHQ9: CLINICAL INTERPRETATION OF PHQ2 SCORE: 0

## 2024-12-31 NOTE — PROGRESS NOTES
Mayank Swan,   Neurology, Movement Disorders [facility]  [address], [address]. [place] [address]  Phone: [phone], Fax: [phone]     ASSESSMENT / PLAN   Leodan Hyatt is a 39 y.o. male presenting for abnormal movements    Hyperkinetic movement disorder  Edentulous dyskinesias  Onset in  with abrupt worsening after COVID vaccination. Movements started in face (but also has edentulous dyskinesias). Then it spread to rest of face, cervical spine, and some involvement in left more than right arms. None in legs. There is improvement when focusing on task, but worse when at rest.     Ddx includes complex motor tics, which have worsened since stopping the dopamine blocking agents and stimulants. Hx of ADHD on stimulant therapy. He also reports an urge to perform movement, and improvement in sensation after the movements are completed. There is also improvement when he is mentally focused on task. What is somewhat inconsistent with tics is that movements worsen when walking and attention.    Ddx also includes dystonia, but there is improvement when distracted with tasks, and there was acute onset within a week. No null position, nor sensory trick. Improves when supine.     Ddx also includes tardive syndrome but seems less likely given the timeline of the progression while on stable medication dose. The face and neck movements are too stereotyped to be considered a choreiform movement. No improvement with VMAT2 inhibitor either.     Normal MRI Brain w/o 2022  Video recording consent obtained      - Trihexyphenidyl 2m tab TID  - consider switching Ingrezza with Austedo XR.   - consider slowly restarting stimulant medication first to treat ADHD. Then consider reintroducing dopamine blocking agents. In case this is complex motor tic, treating ADHD and a dopamine blocking agent can help.  - Check copper serum levels at next blood draw  - pending notes from psychiatry, CINDY  sent 10/2024      ADDENDUM 01/14/25 9/2020: Escitalopram 20 mg, aripiprazole 30 mg nightly  9/29/2020 started to note restlessness, rubbing his nose and hair.  Was weaning off Abilify as it was thought to be akathisia.  12/20/2020: Patient was still taking Abilify 30 mg, directed to decrease and to start Seroquel 25 mg instead.  5/25/2021: No abnormal movements  7/26/2021: Noted to be fidgety, unable to sit still, involuntary movement of tongue and facial movements.  Reported to have started 6/2021.  Started Austedo 6 mg twice daily then 9 mg twice daily  8/17/2021: Ongoing involuntary movements and tongue, trunk, face.  Austedo had been helpful.  Increase 18 mg twice daily.  Start mirtazapine 7.5 mg nightly for sleep.  9/7/2021: abnormal movements persistent, increase Austedo 24 mg twice daily.  9/28/2021: He had inadvertently stopped Austedo, abnormal movements worsened.  Started Ingrezza 80 mg daily.  10/5/2021: Started Ativan 2 mg at noon and 5 PM.  11/16/2021: Had worsened symptoms despite being on Ingrezza and lorazepam.  12/14/2021: Abnormal movements worsen as soon as he leaves the group home to go to appointment.  Ingrezza seems to only last 4 hours.  Lorazepam no longer helpful now.  Started clonazepam 2 mg, noon and 5 PM.  3/17/2022: Started propranolol 10 mg twice daily.  Continued Ingrezza 80, Seroquel 25, clonazepam 2 mg twice daily.  5/16/2022: Propranolol seem to have helpful, but still ongoing involuntary movements.  6/22/2022: When admitted to inpatient psychiatry, started on Abilify 10 mg each morning.  This had seemed to have helped the tardive dyskinesia symptoms.  7/11/2022: Restarted Abilify 5 mg each morning.  8/15/2022: Increase mirtazapine 30 mg each morning.  10/24/2022: Restarted propranolol 20 mg twice a day  11/28/2022, 1/23/2023: No abnormal movements noted.    Ingrezza 80 mg daily, mirtazapine 30 mg nightly.  Clonazepam 2 mg as needed.  Propranolol 20 mg twice a day.  Clonidine 0.1  mg 3 times a day.  Hydroxyzine 100 mg nightly.  Abilify 5 mg morning.  8/2/2023: Since moving in with brother and sister-in-law, he had not been taking medications consistently.  Involuntary movements worsened.  Stopped Abilify  10/18/2023: Discontinued clonidine.  Abnormal movements persisting  3/13/2024: Abnormal movements persisting.  Restarted clonidine 0.1 mg nightly  7/10/2024: Started amantadine 100 mg twice a day.   9/25/2024: Some improvement with amantadine. No med changes      Orders Placed This Encounter    COPPER, SERUM    Referral to Behavioral Health    Referral to Neurology     Return in about 3 months (around 3/31/2025).    BILLING DOCUMENTATION:   Excluding time spent on procedures during visit, I spent 60 minutes reviewing the medical record, interviewing and examining the patient, discussing diagnosis and treatment, and coordinating care.            HISTORY OF PRESENT ILLNESS   Leodan Hyatt is a 39 y.o. male presenting for abnormal movements     PMHx: mild intellectual disability, unspecified psychotic disorder,   unspecified depressive disorder, tardive dyskinesia, ADHD    group home since he was 18 years old. His grandmother is his legal guardian. He also has a .     Initial HPI 10/04/24  Abnormal movements ongoing for the past 3 years (2021). COVID vaccine, then started to have it 6 weeks later. Started in tongue movements (protrusion), but also does not have teeth or dentures. Never had COVID.     Progressively worsened since then. Can't walk down [place]. Head is bald from constant head movements. Constant upper extremitiy movement. Movement is improved with distraction or tasks where is focused on something. Worse when watching TV. Couldn't sleep, trouble talking due to constant movement of face an mouth. Movements resolve when asleep. No dysphagia when eating. No new HA, vision changes, weakness.     [facility], with Dr. Caraballo.   [address],  [id number]  P: [phone]  F: [phone]  Stopped medications that contribute: seroquel, stimulants    Previous medications per chart review (none from psychiatry at this time):  - Seroquel 25 mg p.o. nightly for psychosis and agitation  - Ingrezza 80 mg p.o. daily for tardive dyskinesia  - Trazodone 50 mg p.o. nightly for insomnia  - Propanolol 10 mg p.o. twice daily for anxiety  - Klonopin 2 mg p.o. twice daily as needed     Possible seizure history: 1x/year. Not on any meds. Fall to ground, stiff and quiet. Lasts a few minutes when he gets back up. Doesn't recall what happened during fall.     Interval history  10/04/24: first visit with me. Started trihexyphenidyl 2mg TID  found to be effective until a few weeks ago when he ran out of his other medications (propranolol, clonidine, hydroxyzine, mirtazapine)  11/20/24: no med changes.  +dry mouth with trihexyphenidyl  12/31/24: referral to psychiatry at [facility] and referral to [facility] for second opinion. No med changes.      Past Medical History:   Diagnosis Date    ADHD (attention deficit hyperactivity disorder)     Psychiatric problem     Mild Mental Retardation    Tardive dyskinesia      Past Surgical History:   Procedure Laterality Date    BALIR RECTAL ABSCESS INCISION AND DRAINAGE N/A 2/26/2016    Procedure: BLAIR RECTAL ABSCESS INCISION AND DRAINAGE;  Surgeon: Shanna Lindo M.D.;  Location: SURGERY [facility];  Service:      Family History   Problem Relation Age of Onset    Bipolar disorder Brother     Other Brother      Social History     Socioeconomic History    Marital status: Single     Spouse name: Not on file    Number of children: Not on file    Years of education: Not on file    Highest education level: Not on file   Occupational History    Not on file   Tobacco Use    Smoking status: Former     Current packs/day: 0.25     Average packs/day: 0.3 packs/day for 18.8 years (4.7 ttl pk-yrs)     Types:  Cigarettes     Start date: 2/27/2006    Smokeless tobacco: Never   Vaping Use    Vaping status: Former   Substance and Sexual Activity    Alcohol use: Not Currently     Comment: occasionally    Drug use: Not Currently     Types: Inhaled, Oral     Comment: marijuana occasionally    Sexual activity: Not on file   Other Topics Concern    Not on file   Social History Narrative    Not on file     Social Drivers of Health     Financial Resource Strain: Not on file   Food Insecurity: Not on file   Transportation Needs: Not on file   Physical Activity: Not on file   Stress: Not on file   Social Connections: Not on file   Intimate Partner Violence: Not on file   Housing Stability: Not on file     Current Outpatient Medications   Medication    cloNIDine (CATAPRES) 0.1 MG Tab    propranolol (INDERAL) 20 MG Tab    hydrOXYzine pamoate (VISTARIL) 50 MG Cap    mirtazapine (REMERON) 30 MG Tab tablet    trihexyphenidyl (ARTANE) 2 MG Tab    ferrous sulfate (FEROSUL) 325 (65 Fe) MG tablet    Valbenazine Tosylate (INGREZZA) 80 MG Cap     No current facility-administered medications for this visit.     Allergies   Allergen Reactions    Penicillins              DATA / RESULTS     MRI Brain w/o 1/2022:  No acute intracranial process. Nonspecific T2 hyperintensities are noted in the periventricular and deep white matter, most likely related to chronic microvascular ischemia.    11/11/24: Ceruloplasmin and GAD65 are just borderline, not clinically significant.    Latest Reference Range & Units 10/28/24 10:56   WBC 4.8 - 10.8 K/uL 8.7   RBC 4.70 - 6.10 M/uL 5.17   Hemoglobin 14.0 - 18.0 g/dL 14.4   Hematocrit 42.0 - 52.0 % 44.0   MCV 81.4 - 97.8 fL 85.1   MCH 27.0 - 33.0 pg 27.9   MCHC 32.3 - 36.5 g/dL 32.7   RDW 35.9 - 50.0 fL 38.2   Platelet Count 164 - 446 K/uL 222   MPV 9.0 - 12.9 fL 11.7   Neutrophils-Polys 44.00 - 72.00 % 73.10 (H)   Neutrophils (Absolute) 1.82 - 7.42 K/uL 6.36   Lymphocytes 22.00 - 41.00 % 17.20 (L)   Lymphs  (Absolute) 1.00 - 4.80 K/uL 1.50   Monocytes 0.00 - 13.40 % 7.60   Monos (Absolute) 0.00 - 0.85 K/uL 0.66   Eosinophils 0.00 - 6.90 % 1.10   Eos (Absolute) 0.00 - 0.51 K/uL 0.10   Basophils 0.00 - 1.80 % 0.70   Baso (Absolute) 0.00 - 0.12 K/uL 0.06   Immature Granulocytes 0.00 - 0.90 % 0.30   Immature Granulocytes (abs) 0.00 - 0.11 K/uL 0.03   Nucleated RBC 0.00 - 0.20 /100 WBC 0.00   NRBC (Absolute) K/uL 0.00        C3 Complement 90 - 180 mg/dL 106   Ceruloplasmin 15 - 30 mg/dL 32 (H)   Complement C4 10 - 40 mg/dL 32        Rheumatoid Factor -Neph- 0 - 14 IU/mL <10   Antistreptolysin O Titer <=330 IU/mL 250   Antinuclear Antibody None Detected  None Detected     [facility] Movement Disorder, Autoimmune/Paraneoplastic Evaluation, Serum   Movement, Autoimm/Paraneo, S. Movement Disorder Interp, S   The following antibody was identified: Glutamic Acid Decarboxylase.   * This profile is consistent with predisposition to thyrogastric   disorders, including thyroiditis, pernicious anemia, and type 1   diabetes, but has low specificity for autoimmune encephalopathy.   GAD65 antibody values less than 2.00 nM have a lower positive   predictive value for neurological autoimmunity than values of 20.0   nM and higher.*   ------------------------------------------------------------   IFA Notes   None.   ------------------------------------------------------------   Result Name               Result    Unit    Reference Value   ------------------------------------------------------------   High!! GAD65 Ab Assay, S  0.10      nmol/L  <=0.02     [1]   ------------------------------------------------------------   AMPA-R Ab CBA, S          Negative          Negative   [1]   ------------------------------------------------------------   Amphiphysin Ab, S         Negative          Negative   [1]   ------------------------------------------------------------   AGNA-1, S                 Negative          Negative   [1]    ------------------------------------------------------------   JUSTIN-1, S                 Negative          Negative   [1]   ------------------------------------------------------------   JUSTIN-2, S                 Negative          Negative   [1]   ------------------------------------------------------------   JUSTIN-3, S                 Negative          Negative   [1]   ------------------------------------------------------------   AP3B2 IFA, S              Negative          Negative   [1]   ------------------------------------------------------------   CASPR2-IgG CBA, S         Negative          Negative   [1]   ------------------------------------------------------------   CRMP-5-IgG Western Blot, S   Negative          Negative   [1]   ------------------------------------------------------------   DPPX Ab CBA, S            Negative          Negative   [1]   ------------------------------------------------------------   MICHELLE-B-R Ab CBA, S        Negative          Negative   [1]   ------------------------------------------------------------   GFAP IFA, S               Negative          Negative   [1]   ------------------------------------------------------------   GRAF1 IFA, S              Negative          Negative   [1]   ------------------------------------------------------------   IgLON5 CBA, S             Negative          Negative   [1]   ------------------------------------------------------------   ITPR1 IFA, S              Negative          Negative   [1]   ------------------------------------------------------------   KLHL11 Ab CBA, S          Negative          Negative   [1]   ------------------------------------------------------------   LGI1-IgG CBA, S           Negative          Negative   [1]   ------------------------------------------------------------   mGluR1 Ab IFA, S          Negative          Negative   [1]   ------------------------------------------------------------   Neurochondrin IFA, S       Negative          Negative   [1]   ------------------------------------------------------------   NIF IFA, S                Negative          Negative   [1]   ------------------------------------------------------------   NMDA-R Ab CBA, S          Negative          Negative   [1]   ------------------------------------------------------------   P/Q-Type Calcium Channel Ab   0.00      nmol/L  <=0.02     [1]   ------------------------------------------------------------   PCA-1, S                  Negative          Negative   [1]   ------------------------------------------------------------   PCA-2, S                  Negative          Negative   [1]   ------------------------------------------------------------   PCA-Tr, S                 Negative          Negative   [1]   ------------------------------------------------------------   PDE10A Ab IFA, S          Negative          Negative   [1]   ------------------------------------------------------------   Septin-5 IFA, S           Negative          Negative   [1]   ------------------------------------------------------------   Septin-7 IFA, S           Negative          Negative   [1]   ------------------------------------------------------------   TRIM46 Ab IFA, S          Negative          Negative   [1]     25-Hydroxy   Vitamin D 25   Date Value Ref Range Status   05/16/2024 22 (L) 30 - 100 ng/mL Final     Comment:     Adult Ranges:   <20 ng/mL - Deficiency  20-29 ng/mL - Insufficiency   ng/mL - Sufficiency  Electrochemiluminescence binding assay performed using Roche tacho e  immunoassay analyzer.  The Ele[employer]ys Vitamin D total II assay is intended for  the quantitative determination of total 25 hydroxyvitamin D in human serum  and plasma. This assay is to be used as an aid in the assessment of vitamin  D sufficiency in adults.       Vitamin B12 -True Cobalamin   Date Value Ref Range Status   01/04/2024 654 211 - 911 pg/mL Final     TSH   Date Value Ref  "Range Status   11/16/2023 2.240 0.380 - 5.330 uIU/mL Final     Comment:     The 2011 American Thyroid Association (BRENDA) guidelines  recommended that the interpretation of thyroid function in  pregnancy be based on trimester specific reference ranges.    1st Trimester  0.100-2.500 mIU/L  2nd Trimester  0.200-3.000 mIU/L  3rd Trimester  0.300-3.500 mIU/L    These established reference ranges have not been validated  at [place].       HIV 1/0/2   Date Value Ref Range Status   08/06/2012 see below Non Reactive Final     Comment:     Non Reactive:  Screen for Enhanced HIV 1/O/2 is NEGATIVE for  HIV-1, including group O, and HIV-2 antibodies.  A negative  test result at any point in the investigation of individual  subjects does not preclude the possibility of exposure to or  infection with HIV 1/O/2.     Glycohemoglobin   Date Value Ref Range Status   05/16/2024 5.1 4.0 - 5.6 % Final     Comment:     Increased risk for diabetes:  5.7 -6.4%  Diabetes:  >6.4%  Glycemic control for adults with diabetes:  <7.0%    The above interpretations are per ADA guidelines.  Diagnosis  of diabetes mellitus on the basis of elevated Hemoglobin A1c  should be confirmed by repeating the Hb A1c test.       LDL   Date Value Ref Range Status   05/16/2024 57 <100 mg/dL Final                OBJECTIVE      Vitals:    12/31/24 0709   BP: 116/74   BP Location: Right arm   Patient Position: Sitting   BP Cuff Size: Adult   Pulse: 68   Resp: 16   Temp: 36.3 °C (97.3 °F)   TempSrc: Temporal   SpO2: (!) 68%   Weight: 61.6 kg (135 lb 12.9 oz)   Height: 1.727 m (5' 8\")       Physical Exam  Speech slightly dysarthric    Abnormal movement overall better than last time (was nearly 100% constant, now approx 60%)    Face: Repetitive eye closure, dyskinetic tongue and jaw movement  Neck: Repetitive left rotation, dystonic in appearance. Improved when supine. No rigidity with ROM testing.  Arms: Right arm greater than left arm choreiform " movements. Improved when supine.    Abnormal movements improve to near complete resoluion aside from orolingual movement when performing rest of neurologic exam. Abnormal movements reemerge when at rest or when walking.          PROCEDURE   N/A

## 2024-12-31 NOTE — Clinical Note
Can you help follow up on notes from his psychiatrist: [facility], with Dr. Caraballo.  [address], [place] [address] P: [phone] F: [phone] CINDY was completed in Oct 2024.

## 2025-02-06 DIAGNOSIS — D50.8 OTHER IRON DEFICIENCY ANEMIA: ICD-10-CM

## 2025-02-06 RX ORDER — FERROUS SULFATE 325(65) MG
TABLET ORAL
Qty: 60 TABLET | Refills: 4 | Status: SHIPPED | OUTPATIENT
Start: 2025-02-06

## 2025-02-06 NOTE — TELEPHONE ENCOUNTER
Received request via: Pharmacy    Was the patient seen in the last year in this department? Yes    Does the patient have an active prescription (recently filled or refills available) for medication(s) requested? No      Does the patient have long-term Plus and need 100-day supply? (This applies to ALL medications) Yes, quantity updated to 100 days

## 2025-02-20 ENCOUNTER — HOSPITAL ENCOUNTER (EMERGENCY)
Facility: MEDICAL CENTER | Age: 40
End: 2025-02-20
Attending: EMERGENCY MEDICINE
Payer: MEDICARE

## 2025-02-20 VITALS
HEIGHT: 68 IN | RESPIRATION RATE: 14 BRPM | OXYGEN SATURATION: 97 % | DIASTOLIC BLOOD PRESSURE: 63 MMHG | HEART RATE: 89 BPM | SYSTOLIC BLOOD PRESSURE: 103 MMHG | TEMPERATURE: 97.9 F | WEIGHT: 135 LBS | BODY MASS INDEX: 20.46 KG/M2

## 2025-02-20 DIAGNOSIS — F79 INTELLECTUAL DISABILITY: ICD-10-CM
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
## 2025-02-20 PROCEDURE — 99284 EMERGENCY DEPT VISIT MOD MDM: CPT

## 2025-02-20 ASSESSMENT — FIBROSIS 4 INDEX: FIB4 SCORE: 0.99

## 2025-02-21 NOTE — ED NOTES
Pt d/c home ambulatory with family. Pt given d/c instructions and signed d/c paper. Pt educated on follow up plan and medication usage, pt verbalized understanding of d/c instructions. PT iv removed with bleeding controlled tip intact. PT vs stable. Pt had all belongings at d/c.

## 2025-02-21 NOTE — ED PROVIDER NOTES
ED Provider Note    Scribed for Kody Barnett by Lisa Villeda. 2/20/2025  5:17 PM    Primary care provider: Mk Yanez M.D.  Means of arrival: EMS  History obtained from: Patient  History limited by: None    CHIEF COMPLAINT  Chief Complaint   Patient presents with    Fall     Slipped in the shower and hour ago    Seizure     Brother reported seizure like activity after fall       EXTERNAL RECORDS REVIEWED  Outpatient Notes patient was seen at the [facility] Emergency Department on 6/27/24 for back pain.    HPI/ROS  LIMITATION TO HISTORY   Select: : None  OUTSIDE HISTORIAN(S):  None    HPI  Leodan Hyatt is a 40 y.o. male who presents to the Emergency Department via EMS for evaluation of a ground level fall. The patient states he slipped and fell in the shower and hit his knee. He denies striking his head or loss of consciousness. The patient reports he doesn't know why he fell and states one minute he was up an the next he fell. He reports he remembers the fall and notes his bother heard him fall and went in to check on him. He states his brother reported to the patient that he witness him shaking and the patient states he remembers shaking as well. The patient notes he once had a seizure a long time ago. He states he currently has back pain which he had prior to the fall. He denies alcohol, drug, or tobacco use. He denies being on blood thinners. He notes neurology follows him for his Tardive dyskinesia.    REVIEW OF SYSTEMS  As above, all other systems reviewed and are negative.   See HPI for further details.     PAST MEDICAL HISTORY   has a past medical history of ADHD (attention deficit hyperactivity disorder), Psychiatric problem, and Tardive dyskinesia.  SURGICAL HISTORY   has a past surgical history that includes sintia rectal abscess incision and drainage (N/A, 2/26/2016).  SOCIAL HISTORY  Social History     Tobacco Use    Smoking status: Former     Current packs/day: 0.25     Average  "packs/day: 0.3 packs/day for 19.0 years (4.7 ttl pk-yrs)     Types: Cigarettes     Start date: 2/27/2006    Smokeless tobacco: Never   Vaping Use    Vaping status: Former   Substance Use Topics    Alcohol use: Not Currently     Comment: occasionally    Drug use: Not Currently     Types: Inhaled, Oral     Comment: marijuana occasionally      Social History     Substance and Sexual Activity   Drug Use Not Currently    Types: Inhaled, Oral    Comment: marijuana occasionally     FAMILY HISTORY  Family History   Problem Relation Age of Onset    Bipolar disorder Brother     Other Brother      CURRENT MEDICATIONS  Home Medications    **Home medications have not yet been reviewed for this encounter**       ALLERGIES  Allergies   Allergen Reactions    Penicillins        PHYSICAL EXAM    VITAL SIGNS:   Vitals:    02/20/25 1638 02/20/25 1645 02/20/25 1702   BP:  111/62 101/69   Pulse:  88 87   Resp:  18    Temp:  36.7 °C (98 °F)    TempSrc:  Temporal    SpO2:  98% 97%   Weight: 61.2 kg (135 lb)     Height: 1.727 m (5' 8\")       Vitals: My interpretation: normotensive, not tachycardic, afebrile, not hypoxic    Reinterpretation of vitals: Unchanged unremarkable    PE:   Gen: sitting comfortably, speaking clearly, appears in no acute distress   ENT: Mucous membranes moist, posterior pharynx clear, uvula midline, nares patent bilaterally   Neck: Supple, FROM  Pulmonary: Lungs are clear to auscultation bilaterally. No tachypnea  CV:  RRR, no murmur appreciated, pulses 2+ in both upper and lower extremities  Abdomen: soft, NT/ND; no rebound/guarding  : no CVA or suprapubic tenderness   Neuro: A&Ox4 (person, place, time, situation), speech fluent, gait steady, no focal deficits appreciated  Skin: No rash or lesions.  No pallor or jaundice.  No cyanosis.  Warm and dry.     COURSE & MEDICAL DECISION MAKING  Nursing notes, VS, PMSFHx, labs, imaging, EKG reviewed in chart.    ED Observation Status? No; Patient does not meet criteria " for ED Observation.     Ddx: Strain, sprain, fracture, dislocation, seizure, altered mental status    MDM: 5:17 PM Leodan Hyatt is a 40 y.o. male who presented with history of intellectual disability, was at home with his brother when he slipped in the shower.  Did not hit his head, no loss consciousness, no blood thinners.  Only complaint was some mild knee abrasion/pain.  He was alert and oriented and remembers the entire event.  His brother came to pick him up out of the shower and states that he was shaking was concern for possible seizure.  However the patient remembers the entire event and EMS was called and they said he was not postictal and no signs of seizures.  No history of seizures.  Upon arrival here patient is calm, cooperative, pleasant, intellectually disabled.  Vital signs are normal.  Head to toe physical exam shows no signs of trauma or injury.  Weightbearing.  Full nonpainful range of motion of all joints.  Again no signs of traumatic injury.  Do not think patient had a seizure in any regard.  No loss of bladder or bowel continence, no tongue or lip lacerations.  No history of drug tobacco or alcohol use.  No headaches.  Neuroexam normal.  At this time after discussion with brother, they feel appropriate for discharge home.  Return precautions were discussed and they verbalized understanding and are amenable.    ADDITIONAL PROBLEM LIST AND DISPOSITION    I have discussed management of the patient with the following physicians and REBECA's:  None    Discussion of management with other QHP or appropriate source(s): None     Escalation of care considered, and ultimately not performed:IV fluids, Laboratory analysis, and diagnostic imaging    Barriers to care at this time, including but not limited to:  None .     Decision tools and prescription drugs considered including, but not limited to:  None .    FINAL IMPRESSION  1. Fall, initial encounter Acute   2. Intellectual disability Acute       I, Lisa Villeda ([place]), am scribing for, and in the presence of, Kody Barnett.    Electronically signed by: Lisa Villeda ([place]), 2/20/2025    IKody personally performed the services described in this documentation, as scribed by Lisa Villeda in my presence, and it is both accurate and complete.    The note accurately reflects work and decisions made by me.  Kody Barnett  2/20/2025  6:22 PM

## 2025-02-21 NOTE — DISCHARGE INSTRUCTIONS
No signs of injury from the fall and no signs of seizure activity.  Will need to follow-up with his outpatient team for further evaluation treatment.  Return if is worsening symptoms or concerns.  Thank you for coming in today.

## 2025-02-21 NOTE — ED TRIAGE NOTES
"Chief Complaint   Patient presents with    Fall     Slipped in the shower and hour ago    Seizure     Brother reported seizure like activity after fall     PT BIB [facility] from home, patient was in the shower and slipped and fell, brother reports that patient had seizure like activity for 15-20 seconds, pt has no hx of seizures. [facility] did not note any signs of trauma to patient. Pt reports lower back pain, chronic history of this that is not worse today. - LOC, - head strike, - Blood thinners. Pt has developmental delay but is able to answer questions. Brother is POA    /62   Pulse 88   Temp 36.7 °C (98 °F) (Temporal)   Resp 18   Ht 1.727 m (5' 8\")   Wt 61.2 kg (135 lb)   SpO2 98%   BMI 20.53 kg/m²     "

## 2025-03-11 ENCOUNTER — OFFICE VISIT (OUTPATIENT)
Dept: BEHAVIORAL HEALTH | Facility: CLINIC | Age: 40
End: 2025-03-11
Payer: MEDICARE

## 2025-03-11 DIAGNOSIS — F70 MILD INTELLECTUAL DISABILITY: Chronic | ICD-10-CM

## 2025-03-11 DIAGNOSIS — G24.01 TARDIVE DYSKINESIA: Chronic | ICD-10-CM

## 2025-03-11 PROCEDURE — 99204 OFFICE O/P NEW MOD 45 MIN: CPT | Mod: GC

## 2025-03-11 ASSESSMENT — ENCOUNTER SYMPTOMS
EYES NEGATIVE: 1
GASTROINTESTINAL NEGATIVE: 1
RESPIRATORY NEGATIVE: 1
CONSTITUTIONAL NEGATIVE: 1
PSYCHIATRIC NEGATIVE: 1
CARDIOVASCULAR NEGATIVE: 1
NEUROLOGICAL NEGATIVE: 1
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
## 2025-03-19 PROBLEM — S32.020A COMPRESSION FRACTURE OF L2 (HCC): Status: RESOLVED | Noted: 2024-09-25 | Resolved: 2025-03-19

## 2025-03-26 ENCOUNTER — APPOINTMENT (OUTPATIENT)
Dept: FAMILY PLANNING/WOMEN'S HEALTH CLINIC | Facility: PHYSICIAN GROUP | Age: 40
End: 2025-03-26
Attending: FAMILY MEDICINE
Payer: MEDICARE

## 2025-03-26 VITALS
HEART RATE: 98 BPM | SYSTOLIC BLOOD PRESSURE: 104 MMHG | HEIGHT: 69 IN | DIASTOLIC BLOOD PRESSURE: 70 MMHG | RESPIRATION RATE: 16 BRPM | WEIGHT: 125 LBS | OXYGEN SATURATION: 97 % | BODY MASS INDEX: 18.51 KG/M2

## 2025-03-26 DIAGNOSIS — G24.01 TARDIVE DYSKINESIA: Chronic | ICD-10-CM

## 2025-03-26 DIAGNOSIS — F39 MOOD DISORDER (HCC): ICD-10-CM

## 2025-03-26 DIAGNOSIS — F90.2 ATTENTION DEFICIT HYPERACTIVITY DISORDER (ADHD), COMBINED TYPE: ICD-10-CM

## 2025-03-26 DIAGNOSIS — F33.0 MILD EPISODE OF RECURRENT MAJOR DEPRESSIVE DISORDER (HCC): Chronic | ICD-10-CM

## 2025-03-26 PROCEDURE — G0438 PPPS, INITIAL VISIT: HCPCS

## 2025-03-26 PROCEDURE — 1126F AMNT PAIN NOTED NONE PRSNT: CPT

## 2025-03-26 PROCEDURE — 3074F SYST BP LT 130 MM HG: CPT

## 2025-03-26 PROCEDURE — 3078F DIAST BP <80 MM HG: CPT

## 2025-03-26 RX ORDER — PROPRANOLOL HCL 20 MG
20 TABLET ORAL 2 TIMES DAILY
COMMUNITY

## 2025-03-26 RX ORDER — AMANTADINE HYDROCHLORIDE 100 MG/1
100 CAPSULE, GELATIN COATED ORAL 2 TIMES DAILY
COMMUNITY

## 2025-03-26 RX ORDER — MIRTAZAPINE 30 MG/1
30 TABLET, FILM COATED ORAL NIGHTLY
COMMUNITY

## 2025-03-26 RX ORDER — HYDROXYZINE PAMOATE 100 MG
100 CAPSULE ORAL 3 TIMES DAILY PRN
COMMUNITY

## 2025-03-26 SDOH — ECONOMIC STABILITY: HOUSING INSECURITY: AT ANY TIME IN THE PAST 12 MONTHS, WERE YOU HOMELESS OR LIVING IN A SHELTER (INCLUDING NOW)?: NO

## 2025-03-26 SDOH — HEALTH STABILITY: PHYSICAL HEALTH: ON AVERAGE, HOW MANY DAYS PER WEEK DO YOU ENGAGE IN MODERATE TO STRENUOUS EXERCISE (LIKE A BRISK WALK)?: 0 DAYS

## 2025-03-26 SDOH — ECONOMIC STABILITY: FOOD INSECURITY: WITHIN THE PAST 12 MONTHS, THE FOOD YOU BOUGHT JUST DIDN'T LAST AND YOU DIDN'T HAVE MONEY TO GET MORE.: NEVER TRUE

## 2025-03-26 SDOH — ECONOMIC STABILITY: TRANSPORTATION INSECURITY: IN THE PAST 12 MONTHS, HAS LACK OF TRANSPORTATION KEPT YOU FROM MEDICAL APPOINTMENTS OR FROM GETTING MEDICATIONS?: NO

## 2025-03-26 SDOH — ECONOMIC STABILITY: FOOD INSECURITY: WITHIN THE PAST 12 MONTHS, YOU WORRIED THAT YOUR FOOD WOULD RUN OUT BEFORE YOU GOT THE MONEY TO BUY MORE.: NEVER TRUE

## 2025-03-26 SDOH — ECONOMIC STABILITY: HOUSING INSECURITY: IN THE LAST 12 MONTHS, WAS THERE A TIME WHEN YOU WERE NOT ABLE TO PAY THE MORTGAGE OR RENT ON TIME?: NO

## 2025-03-26 SDOH — ECONOMIC STABILITY: FOOD INSECURITY: HOW HARD IS IT FOR YOU TO PAY FOR THE VERY BASICS LIKE FOOD, HOUSING, MEDICAL CARE, AND HEATING?: NOT HARD AT ALL

## 2025-03-26 SDOH — HEALTH STABILITY: PHYSICAL HEALTH: ON AVERAGE, HOW MANY MINUTES DO YOU ENGAGE IN EXERCISE AT THIS LEVEL?: 0 MIN

## 2025-03-26 ASSESSMENT — ACTIVITIES OF DAILY LIVING (ADL)
BATHING_REQUIRES_ASSISTANCE: 0
LACK_OF_TRANSPORTATION: NO

## 2025-03-26 ASSESSMENT — ENCOUNTER SYMPTOMS: GENERAL WELL-BEING: EXCELLENT

## 2025-03-26 ASSESSMENT — PATIENT HEALTH QUESTIONNAIRE - PHQ9: CLINICAL INTERPRETATION OF PHQ2 SCORE: 0

## 2025-03-26 ASSESSMENT — PAIN SCALES - GENERAL: PAINLEVEL_OUTOF10: NO PAIN

## 2025-03-26 ASSESSMENT — FIBROSIS 4 INDEX: FIB4 SCORE: 0.99

## 2025-03-26 NOTE — PROGRESS NOTES
Comprehensive Health Assessment Program     Leodan Hyatt is a 40 y.o. here for his comprehensive health assessment.  The patient is accompanied by Kimberly who provides the patient with rides to his appointments.    Patient Active Problem List    Diagnosis Date Noted    Administrative encounter 11/14/2024    Abnormal movements 10/09/2024    Chronic midline low back pain without sciatica 09/17/2024    Vitamin D deficiency 11/17/2023    Mild episode of recurrent major depressive disorder (HCC) 11/16/2023    Tardive dyskinesia 11/16/2023    Need for vaccination 11/16/2023    Mild intellectual disability 09/02/2020    Unspecified contact dermatitis, unspecified cause 12/06/2016    Attention deficit hyperactivity disorder (ADHD), combined type 09/29/2015    Mood disorder (HCC) 09/29/2015    Skin disorder 08/18/2014       Current Outpatient Medications   Medication Sig Dispense Refill    hydrOXYzine pamoate (VISTARIL) 100 MG Cap Take 100 mg by mouth 3 times a day as needed for Itching.      CLONIDINE HCL PO Take  by mouth.      propranolol (INDERAL) 20 MG Tab Take 20 mg by mouth 2 times a day.      amantadine (SYMMETREL) 100 MG Cap Take 100 mg by mouth 2 times a day.      mirtazapine (REMERON) 30 MG Tab tablet Take 30 mg by mouth every evening.      ferrous sulfate 325 (65 Fe) MG tablet TAKE 1 TABLET BY MOUTH ON MONDAY, WEDNESDAY AND FRIDAY 60 Tablet 4    trihexyphenidyl (ARTANE) 2 MG Tab Take 1 Tablet by mouth 3 times a day for 360 days. For abnormal movements. For the first week: take half tablet, 3 times a day. 270 Tablet 3    Valbenazine Tosylate (INGREZZA) 80 MG Cap Take 80 mg by mouth.       No current facility-administered medications for this visit.          Current supplements as per medication list.     Allergies:   Penicillins  Social History     Tobacco Use    Smoking status: Former     Current packs/day: 0.25     Average packs/day: 0.3 packs/day for 19.1 years (4.8 ttl pk-yrs)     Types:  Cigarettes     Start date: 2/27/2006    Smokeless tobacco: Never   Vaping Use    Vaping status: Former   Substance Use Topics    Alcohol use: Not Currently     Comment: occasionally    Drug use: Not Currently     Types: Inhaled, Oral     Comment: marijuana occasionally     Family History   Problem Relation Age of Onset    Bipolar disorder Brother     Other Brother      Leodan  has a past medical history of ADHD (attention deficit hyperactivity disorder), Compression fracture of L2 (HCC) (09/25/2024), Psychiatric problem, and Tardive dyskinesia.   Past Surgical History:   Procedure Laterality Date    BLAIR RECTAL ABSCESS INCISION AND DRAINAGE N/A 2/26/2016    Procedure: BLAIR RECTAL ABSCESS INCISION AND DRAINAGE;  Surgeon: Shanna Lindo M.D.;  Location: SURGERY [facility];  Service:        Depression Screening  Little interest or pleasure in doing things?  0 - not at all  Feeling down, depressed , or hopeless? 0 - not at all  Patient Health Questionnaire Score: 0     If depressive symptoms identified deferred to follow up visit unless specifically addressed in assessment and plan.    Interpretation of PHQ-9 Total Score   Score Severity   1-4 No Depression   5-9 Mild Depression   10-14 Moderate Depression   15-19 Moderately Severe Depression   20-27 Severe Depression    Screening for Cognitive Impairment  Do you or any of your friends or family members have any concern about your memory? No    Fall Risk Assessment  Has the patient had two or more falls in the last year or any fall with injury in the last year?  Yes    Safety Assessment  Do you always wear your seatbelt?  Yes  Any changes to home needed to function safely? No  Difficulty hearing.  No  Patient counseled about all safety risks that were identified.    Functional Assessment ADLs  Are there any barriers preventing you from cooking for yourself or meeting nutritional needs?  No.    Are there any barriers preventing you from driving safely or  obtaining transportation?  No.    Are there any barriers preventing you from using a telephone or calling for help?  No    Are there any barriers preventing you from shopping?  No.    Are there any barriers preventing you from taking care of your own finances?  Yes    Are there any barriers preventing you from managing your medications?  No    Are there any barriers preventing you from showering, bathing or dressing yourself? No    Are there any barriers preventing you from doing housework or laundry? Yes  Are there any barriers preventing you from using the toilet?No  Are you currently engaging in any exercise or physical activity?  No.      Self-Assessment of Health  What is your perception of your health? Excellent    Do you sleep more than six hours a night? Yes    In the past 7 days, how much did pain keep you from doing your normal work? None    Do you spend quality time with family or friends (virtually or in person)? Yes    Do you usually eat a heart healthy diet that constists of a variety of fruits, vegetables, whole grains and fiber? Yes    Do you eat foods high in fat and/or Fast Food more than three times per week? No    How concerned are you that your medical conditions are not being well managed? Not at all    Are you worried that in the next 2 months, you may not have stable housing that you own, rent, or stay in as part of a household? No      Advance Care Planning  Do you have an Advance Directive, Living Will, Durable Power of , or POLST? Yes                 Health Maintenance Summary            Current Care Gaps       Chickenpox Vaccine (Varicella) (1 of 2 - 13+ 2-dose series) Overdue since 1/25/1998 05/16/2024  Visit Dx: Varicella with complication              Influenza Vaccine (1) Overdue since 9/1/2024 11/16/2023  Imm Admin: Influenza Vaccine Quad Inj (Pf)    10/02/2021  Imm Admin: Influenza Vaccine Quad Inj (Pf)    10/04/2020  Imm Admin: Influenza Vac Subunit Quad Inj (Pf)     10/20/2019  Imm Admin: Influenza Vac Subunit Quad Inj (Pf)    10/10/2018  Imm Admin: Influenza Vaccine Quad Inj (Pf)     Only the first 5 history entries have been loaded, but more history exists.            COVID-19 Vaccine (1 - 2024-25 season) Never done      No completion history exists for this topic.                      Upcoming       Annual Wellness Visit (Yearly) Next due on 3/26/2026      03/26/2025  Level of Service: LA INITIAL ANNUAL WELLNESS VISIT-INCLUDES PPPS              IMM DTaP/Tdap/Td Vaccine (8 - Td or Tdap) Next due on 9/14/2032 09/14/2022  Imm Admin: Tdap Vaccine    09/21/2012  Imm Admin: Tdap Vaccine    03/06/2002  Imm Admin: TD Vaccine    10/02/1990  Imm Admin: DTP - Historical vaccine    02/19/1987  Imm Admin: DTP - Historical vaccine     Only the first 5 history entries have been loaded, but more history exists.                    Completed or No Longer Recommended       Hepatitis B Vaccine (Hep B) (Series Information) Completed      03/07/2001  Imm Admin: Hepatitis B Vaccine Adolescent/Pediatric    02/19/1999  Imm Admin: Hepatitis B Vaccine Adolescent/Pediatric    08/06/1998  Imm Admin: Hepatitis B Vaccine Adolescent/Pediatric              Polio Vaccine (Inactivated Polio) (Series Information) Completed      10/02/1990  Imm Admin: OPV TRIVALENT - HISTORICAL DATA (GIVEN PRIOR TO MAY 2016)    02/19/1987  Imm Admin: OPV TRIVALENT - HISTORICAL DATA (GIVEN PRIOR TO MAY 2016)    1985  Imm Admin: OPV TRIVALENT - HISTORICAL DATA (GIVEN PRIOR TO MAY 2016)    1985  Imm Admin: OPV TRIVALENT - HISTORICAL DATA (GIVEN PRIOR TO MAY 2016)              Hepatitis C Screening  Completed      08/06/2012  Hepatitis C Antibody component of HEPATITIS PANEL ACUTE(4 COMPONENTS)              Hepatitis A Vaccine (Hep A) (Series Information) Aged Out      No completion history exists for this topic.              HPV Vaccines (Series Information) Aged Out     No completion history exists for this  "topic.              Meningococcal Immunization (Series Information) Aged Out     No completion history exists for this topic.              Pneumococcal Vaccine: 0-49 Years (Series Information) Aged Out      09/14/2022  Imm Admin: Pneumococcal Conjugate Vaccine (PCV20)                            Patient Care Team:  Mk Yanez M.D. as PCP - General (Internal Medicine)  [facility]      Financial Resource Strain: Low Risk  (3/26/2025)    Overall Financial Resource Strain (CARDIA)     Difficulty of Paying Living Expenses: Not hard at all      Transportation Needs: No Transportation Needs (3/26/2025)    PRAPARE - Transportation     Lack of Transportation (Medical): No     Lack of Transportation (Non-Medical): No      Food Insecurity: No Food Insecurity (3/26/2025)    Hunger Vital Sign     Worried About Running Out of Food in the Last Year: Never true     Ran Out of Food in the Last Year: Never true        Encounter Vitals  Blood Pressure: 104/70  Pulse: 98  Respiration: 16  Pulse Oximetry: 97 %  Weight: 56.7 kg (125 lb)  Height: 175.3 cm (5' 9\")  BMI (Calculated): 18.46  Pain Score: No pain     ROS:  No fever, chills, nausea, vomiting, diarrhea, chest pain or shortness of breath. See HPI.    Physical Exam:  Constitutional: NAD  HENMT: NC/AT, PERRLA, EOMI, OP clear, TM's clear, no lymphadenopathy, no thyromegaly.  No JVD.  Cardiovascular: RRR, No m/r/g  Lungs: CTAB, no w/r/r  Extremities: 2+ DP, PT and Radial pulses bilaterally. No c/c/e  Skin: No legions notes  Neurologic: Alert & oriented x3, CN II-XII grossly intact    Assessment and Plan. The following treatment and monitoring plan is recommended:    Attention deficit hyperactivity disorder (ADHD), combined type  Chronic, stable.  The patient is being followed by Psych Dr. Barney.  No current   Follow-up at least annually.      Mild episode of recurrent major depressive disorder (HCC)  Chronic, stable.  PHQ-9 score today is 0. The patient reports good " mood most of the time.  He denies SI/HI. The patient is being followed by psychiatry Dr. ford Martinez.  Continue with current defined treatment plan: Valbenazine Tosylate (INGREZZA) 80 MG Cap, mirtazapine (REMERON) 30 MG Tab tablet. Follow-up at least annually.      Tardive dyskinesia  Chronic, stable.  The patient is being followed by neurology Dr. Swan.  Continue with current defined treatment plan: trihexyphenidyl (ARTANE) 2 MG Tab. Follow-up at least annually.      Mood disorder (HCC)  Chronic, stable.  PHQ-9 score today is 0. The patient reports good mood most of the time.  He denies SI/HI. The patient is being followed by psychiatry Dr. ford Martinez.  Continue with current defined treatment plan: Valbenazine Tosylate (INGREZZA) 80 MG Cap, mirtazapine (REMERON) 30 MG Tab tablet. Follow-up at least annually.         Services suggested: No services needed at this time  Health Care Screening: Age-appropriate preventive services recommended by USPTF and ACIP covered by Medicare were discussed today. Services ordered if indicated and agreed upon by the patient.  Referrals offered: Community-based lifestyle interventions to reduce health risks and promote self-management and wellness, fall prevention, nutrition, physical activity, tobacco-use cessation, weight loss, and mental health services as per orders if indicated.    Discussion today about general wellness and lifestyle habits:    Prevent falls and reduce trip hazards; Cautioned about securing or removing rugs.  Have a working fire alarm and carbon monoxide detector.  Engage in regular physical activity and social activities.    Follow-up: Return for appointment with Primary Care Provider as needed.

## 2025-03-26 NOTE — ASSESSMENT & PLAN NOTE
Chronic, stable.  PHQ-9 score today is 0. The patient reports good mood most of the time.  He denies SI/HI. The patient is being followed by psychiatry Dr. ford Martinez.  Continue with current defined treatment plan: Valbenazine Tosylate (INGREZZA) 80 MG Cap, mirtazapine (REMERON) 30 MG Tab tablet. Follow-up at least annually.

## 2025-03-26 NOTE — ASSESSMENT & PLAN NOTE
Chronic, stable.  The patient is being followed by neurology Dr. Swan.  Continue with current defined treatment plan: trihexyphenidyl (ARTANE) 2 MG Tab. Follow-up at least annually.

## 2025-03-26 NOTE — ASSESSMENT & PLAN NOTE
Chronic, stable.  The patient is being followed by Psych Dr. Barney.  No current   Follow-up at least annually.

## 2025-03-31 ENCOUNTER — TELEPHONE (OUTPATIENT)
Dept: NEUROLOGY | Facility: MEDICAL CENTER | Age: 40
End: 2025-03-31
Payer: MEDICARE

## 2025-03-31 NOTE — TELEPHONE ENCOUNTER
NEUROLOGY PATIENT PRE-VISIT PLANNING     Patient was NOT contacted to complete PVP.  Note: Patient will not be contacted if there is no indication to call.     Patient Appointment is scheduled as: Established Patient     Is visit type and length scheduled correctly? Yes    [facility] Patient Demographics? Yes    3.   Is referral attached to visit? Yes    4. Were records received from referring provider? Yes    4. Patient was NOT contacted to have someone accompany them to visit.     5. Is this appointment scheduled as a Hospital Follow-Up?  No    6. Does the patient require any pre procedure or post procedure follow up? No    7. If any orders were placed at last visit or intended to be done for this visit do we have Results/Consult Notes? Yes  Labs - Labs ordered, NOT completed. Patient advised to complete prior to next appointment.  Imaging - Imaging was not ordered at last office visit.  Referrals - Referral ordered, patient was seen and consult notes are in chart. Care Teams updated  YES.  Note: If patient appointment is for lab or imaging review and patient did not complete the studies, check with provider if OK to reschedule patient until completed.    8. If patient appointment is for Botox - is order pended for provider? N/A    9. Was Plan Assessment from last Neurology Office Visit Reviewed?  Yes

## 2025-04-01 ENCOUNTER — APPOINTMENT (OUTPATIENT)
Dept: NEUROLOGY | Facility: MEDICAL CENTER | Age: 40
End: 2025-04-01
Attending: INTERNAL MEDICINE
Payer: MEDICARE

## 2025-04-29 ENCOUNTER — OFFICE VISIT (OUTPATIENT)
Dept: NEUROLOGY | Facility: MEDICAL CENTER | Age: 40
End: 2025-04-29
Attending: INTERNAL MEDICINE
Payer: MEDICARE

## 2025-04-29 VITALS
RESPIRATION RATE: 18 BRPM | HEIGHT: 68 IN | TEMPERATURE: 97.6 F | SYSTOLIC BLOOD PRESSURE: 100 MMHG | BODY MASS INDEX: 18.78 KG/M2 | HEART RATE: 89 BPM | WEIGHT: 123.9 LBS | OXYGEN SATURATION: 96 % | DIASTOLIC BLOOD PRESSURE: 60 MMHG

## 2025-04-29 DIAGNOSIS — G25.5 CHOREA: ICD-10-CM

## 2025-04-29 DIAGNOSIS — G24.01 TARDIVE DYSTONIA: ICD-10-CM

## 2025-04-29 PROCEDURE — 99212 OFFICE O/P EST SF 10 MIN: CPT | Performed by: INTERNAL MEDICINE

## 2025-04-29 RX ORDER — TRIHEXYPHENIDYL HYDROCHLORIDE 2 MG/1
4 TABLET ORAL 3 TIMES DAILY
Qty: 540 TABLET | Refills: 3 | Status: SHIPPED | OUTPATIENT
Start: 2025-04-29 | End: 2026-04-24

## 2025-04-29 RX ORDER — CLONAZEPAM 1 MG/1
1 TABLET ORAL 2 TIMES DAILY
Qty: 180 TABLET | Refills: 1 | Status: SHIPPED | OUTPATIENT
Start: 2025-04-29 | End: 2025-10-26

## 2025-04-29 ASSESSMENT — PATIENT HEALTH QUESTIONNAIRE - PHQ9
CLINICAL INTERPRETATION OF PHQ2 SCORE: 1
5. POOR APPETITE OR OVEREATING: 0 - NOT AT ALL
SUM OF ALL RESPONSES TO PHQ QUESTIONS 1-9: 7

## 2025-04-29 ASSESSMENT — FIBROSIS 4 INDEX: FIB4 SCORE: 0.99

## 2025-04-29 NOTE — PATIENT INSTRUCTIONS
Week 1: clonazepam 1mg: half-tablet, twice a day  Week 2: clonazepam 1mg: one-tablet, twice a day  Week 4: if tolerating clonazepam well, increase trihexyphenidyl 2m tablets, three times a day

## 2025-04-29 NOTE — PROGRESS NOTES
Mayank Swan,   Neurology, Movement Disorders [facility]   [address], [address]. [place] [address]  Phone: [phone], Fax: [phone]     ASSESSMENT / PLAN   Leodan Hyatt is a 39 y.o. male presenting for abnormal movements    Tardive dystonia  Tardive dyskinesia  Edentulous dyskinesias  Onset in  with abrupt worsening after COVID vaccination. Movements started in face (but also has edentulous dyskinesias). Then it spread to rest of face, cervical spine, and some involvement in left more than right arms. None in legs. There is improvement when focusing on task, but worse when at rest.     Working dx is tardive syndrome as symptoms seem to emerge after changes to dopamine blocking medication. Most bothersome symptoms are the dystonic neck movements, which has been partially responsive to trihexyphenidyl. This can benefit with baclofen and clonazepam as well. If still refractory, consider deep brain stimulation targeting bilateral GPi.    Normal MRI Brain w/o 2022  Video recording consent obtained    - continue Ingrezza 80mg daily  - trihexyphenidyl 2m tab, 3x/day  - START clonazepam for both dystonia and as anti-seizure medication  Week 1: clonazepam 1mg: half-tablet, twice a day  Week 2: clonazepam 1mg: one-tablet, twice a day  Week 4: if tolerating clonazepam well, increase trihexyphenidyl 2 tablets (4mg), three times a day       Seizures  - Repeat MRI Brain w/wo due to recurrent seizures once neck movements improve  - clonazepam as above      Orders Placed This Encounter    trihexyphenidyl (ARTANE) 2 MG Tab    clonazePAM (KLONOPIN) 1 MG Tab     Return in about 4 months (around 2025).    BILLING DOCUMENTATION:   Excluding time spent on procedures during visit, I spent 60 minutes reviewing the medical record, interviewing and examining the patient, discussing diagnosis and treatment, and coordinating care.            HISTORY OF PRESENT ILLNESS   Leodan  Rosendo Hyatt is a 39 y.o. male presenting for abnormal movements     PMHx: mild intellectual disability, unspecified psychotic disorder,   unspecified depressive disorder, tardive dyskinesia, ADHD    group home since he was 18 years old. His grandmother is his legal guardian. He also has a .     Initial HPI 10/04/24  Abnormal movements   ongoing for the past 3 years (2021). COVID vaccine, then started to have it 6 weeks later. Started in tongue movements (protrusion), but also does not have teeth or dentures. Never had COVID.     Progressively worsened since then. Can't walk down [place]. Head is bald from constant head movements. Constant upper extremitiy movement. Movement is improved with distraction or tasks where is focused on something. Worse when watching TV. Couldn't sleep, trouble talking due to constant movement of face an mouth. Movements resolve when asleep. No dysphagia when eating. No new HA, vision changes, weakness.     [facility], with Dr. Caraballo.   [address], [place] [address]  P: [phone]  F: [phone]  Stopped medications that contribute: seroquel, stimulants    Previous medications per chart review:  - Seroquel 25 mg p.o. nightly for psychosis and agitation  - Ingrezza 80 mg p.o. daily for tardive dyskinesia  - Trazodone 50 mg p.o. nightly for insomnia  - Propanolol 10 mg p.o. twice daily for anxiety  - Klonopin 2 mg p.o. twice daily as needed     Summary of psychiatry notes:  9/2020: Escitalopram 20 mg, aripiprazole 30 mg nightly  9/29/2020 started to note restlessness, rubbing his nose and hair.  Was weaning off Abilify as it was thought to be akathisia.  12/20/2020: Patient was still taking Abilify 30 mg, directed to decrease and to start Seroquel 25 mg instead.  5/25/2021: No abnormal movements  7/26/2021: Noted to be fidgety, unable to sit still, involuntary movement of tongue and facial movements.  Reported to have started 6/2021.  Started Austedo 6  mg twice daily then 9 mg twice daily  8/17/2021: Ongoing involuntary movements and tongue, trunk, face.  Austedo had been helpful.  Increase 18 mg twice daily.  Start mirtazapine 7.5 mg nightly for sleep.  9/7/2021: abnormal movements persistent, increase Austedo 24 mg twice daily.  9/28/2021: He had inadvertently stopped Austedo, abnormal movements worsened.  Started Ingrezza 80 mg daily.  10/5/2021: Started Ativan 2 mg at noon and 5 PM.  11/16/2021: Had worsened symptoms despite being on Ingrezza and lorazepam.  12/14/2021: Abnormal movements worsen as soon as he leaves the group home to go to appointment.  Ingrezza seems to only last 4 hours.  Lorazepam no longer helpful now.  Started clonazepam 2 mg, noon and 5 PM.  3/17/2022: Started propranolol 10 mg twice daily.  Continued Ingrezza 80, Seroquel 25, clonazepam 2 mg twice daily.  5/16/2022: Propranolol seem to have helpful, but still ongoing involuntary movements.  6/22/2022: When admitted to inpatient psychiatry, started on Abilify 10 mg each morning.  This had seemed to have helped the tardive dyskinesia symptoms.  7/11/2022: Restarted Abilify 5 mg each morning.  8/15/2022: Increase mirtazapine 30 mg each morning.  10/24/2022: Restarted propranolol 20 mg twice a day  11/28/2022, 1/23/2023: No abnormal movements noted. Ingrezza 80 mg daily, mirtazapine 30 mg nightly.  Clonazepam 2 mg as needed.  Propranolol 20 mg twice a day.  Clonidine 0.1 mg 3 times a day.  Hydroxyzine 100 mg nightly.  Abilify 5 mg morning.  8/2/2023: Since moving in with brother and sister-in-law, he had not been taking medications consistently.  Involuntary movements worsened.  Stopped Abilify  10/18/2023: Discontinued clonidine.  Abnormal movements persisting  3/13/2024: Abnormal movements persisting.  Restarted clonidine 0.1 mg nightly  7/10/2024: Started amantadine 100 mg twice a day.   9/25/2024: Some improvement with amantadine. No med changes      04/29/25: doing OK with  trihexyphenidyl, interested on higher dose as neck movements still bothersome      Seizures  Possible seizure history: 1x/year. Not on any meds. Fall to ground, stiff and quiet. Lasts a few minutes when he gets back up. Doesn't recall what happened during fall.     04/29/25: reportedly had a seizure while living with his brother. One in bathroom, hallway, and again in kitchen. On the ground, stiff and convulsing, with head turn towards right and eyes fixed. Does not recall episode. Went to hospital 2nd episode.       Interval history  10/04/24: first visit with me. Started trihexyphenidyl 2mg TID  found to be effective until a few weeks ago when he ran out of his other medications (propranolol, clonidine, hydroxyzine, mirtazapine)  11/20/24: no med changes.  +dry mouth with trihexyphenidyl  12/31/24: referral to psychiatry at [facility] and referral to [facility] for second opinion but out of network. No med changes.  04/29/25: start clonazepam 1mg BID, then if well tolerated increase trihexiphenydil 4mg BID      Past Medical History:   Diagnosis Date    ADHD (attention deficit hyperactivity disorder)     Compression fracture of L2 (HCC) 09/25/2024    > SPINE      Psychiatric problem     Mild Mental Retardation    Tardive dyskinesia      Past Surgical History:   Procedure Laterality Date    BLAIR RECTAL ABSCESS INCISION AND DRAINAGE N/A 2/26/2016    Procedure: BLAIR RECTAL ABSCESS INCISION AND DRAINAGE;  Surgeon: Shanna Lindo M.D.;  Location: SURGERY [facility];  Service:      Family History   Problem Relation Age of Onset    Bipolar disorder Brother     Other Brother      Social History     Socioeconomic History    Marital status: Single     Spouse name: Not on file    Number of children: Not on file    Years of education: Not on file    Highest education level: Not on file   Occupational History    Not on file   Tobacco Use    Smoking status: Former     Current packs/day: 0.25      Average packs/day: 0.3 packs/day for 19.2 years (4.8 ttl pk-yrs)     Types: Cigarettes     Start date: 2/27/2006    Smokeless tobacco: Never   Vaping Use    Vaping status: Former   Substance and Sexual Activity    Alcohol use: Not Currently     Comment: occasionally    Drug use: Not Currently     Types: Inhaled, Oral     Comment: marijuana occasionally    Sexual activity: Not on file   Other Topics Concern    Not on file   Social History Narrative    Not on file     Social Drivers of Health     Financial Resource Strain: Low Risk  (3/26/2025)    Overall Financial Resource Strain (CARDIA)     Difficulty of Paying Living Expenses: Not hard at all   Food Insecurity: No Food Insecurity (3/26/2025)    Hunger Vital Sign     Worried About Running Out of Food in the Last Year: Never true     Ran Out of Food in the Last Year: Never true   Transportation Needs: No Transportation Needs (3/26/2025)    PRAPARE - Transportation     Lack of Transportation (Medical): No     Lack of Transportation (Non-Medical): No   Physical Activity: Inactive (3/26/2025)    Exercise Vital Sign     Days of Exercise per Week: 0 days     Minutes of Exercise per Session: 0 min   Stress: Not on file   Social Connections: Not on file   Intimate Partner Violence: Not on file   Housing Stability: Unknown (3/26/2025)    Housing Stability Vital Sign     Unable to Pay for Housing in the Last Year: No     Number of Times Moved in the Last Year: Not on file     Homeless in the Last Year: No     Current Outpatient Medications   Medication    trihexyphenidyl (ARTANE) 2 MG Tab    clonazePAM (KLONOPIN) 1 MG Tab    hydrOXYzine pamoate (VISTARIL) 100 MG Cap    CLONIDINE HCL PO    propranolol (INDERAL) 20 MG Tab    amantadine (SYMMETREL) 100 MG Cap    mirtazapine (REMERON) 30 MG Tab tablet    ferrous sulfate 325 (65 Fe) MG tablet    Valbenazine Tosylate (INGREZZA) 80 MG Cap     No current facility-administered medications for this visit.     Allergies   Allergen  Reactions    Penicillins              DATA / RESULTS     MRI Brain w/o 1/2022:  No acute intracranial process. Nonspecific T2 hyperintensities are noted in the periventricular and deep white matter, most likely related to chronic microvascular ischemia.    11/11/24: Ceruloplasmin and GAD65 are just borderline, not clinically significant.    Latest Reference Range & Units 10/28/24 10:56   WBC 4.8 - 10.8 K/uL 8.7   RBC 4.70 - 6.10 M/uL 5.17   Hemoglobin 14.0 - 18.0 g/dL 14.4   Hematocrit 42.0 - 52.0 % 44.0   MCV 81.4 - 97.8 fL 85.1   MCH 27.0 - 33.0 pg 27.9   MCHC 32.3 - 36.5 g/dL 32.7   RDW 35.9 - 50.0 fL 38.2   Platelet Count 164 - 446 K/uL 222   MPV 9.0 - 12.9 fL 11.7   Neutrophils-Polys 44.00 - 72.00 % 73.10 (H)   Neutrophils (Absolute) 1.82 - 7.42 K/uL 6.36   Lymphocytes 22.00 - 41.00 % 17.20 (L)   Lymphs (Absolute) 1.00 - 4.80 K/uL 1.50   Monocytes 0.00 - 13.40 % 7.60   Monos (Absolute) 0.00 - 0.85 K/uL 0.66   Eosinophils 0.00 - 6.90 % 1.10   Eos (Absolute) 0.00 - 0.51 K/uL 0.10   Basophils 0.00 - 1.80 % 0.70   Baso (Absolute) 0.00 - 0.12 K/uL 0.06   Immature Granulocytes 0.00 - 0.90 % 0.30   Immature Granulocytes (abs) 0.00 - 0.11 K/uL 0.03   Nucleated RBC 0.00 - 0.20 /100 WBC 0.00   NRBC (Absolute) K/uL 0.00        C3 Complement 90 - 180 mg/dL 106   Ceruloplasmin 15 - 30 mg/dL 32 (H)   Complement C4 10 - 40 mg/dL 32        Rheumatoid Factor -Neph- 0 - 14 IU/mL <10   Antistreptolysin O Titer <=330 IU/mL 250   Antinuclear Antibody None Detected  None Detected     [facility] Movement Disorder, Autoimmune/Paraneoplastic Evaluation, Serum   Movement, Autoimm/Paraneo, S. Movement Disorder Interp, S   The following antibody was identified: Glutamic Acid Decarboxylase.   * This profile is consistent with predisposition to thyrogastric   disorders, including thyroiditis, pernicious anemia, and type 1   diabetes, but has low specificity for autoimmune encephalopathy.   GAD65 antibody values less than 2.00 nM have a  lower positive   predictive value for neurological autoimmunity than values of 20.0   nM and higher.*   ------------------------------------------------------------   IFA Notes   None.   ------------------------------------------------------------   Result Name               Result    Unit    Reference Value   ------------------------------------------------------------   High!! GAD65 Ab Assay, S  0.10      nmol/L  <=0.02     [1]   ------------------------------------------------------------   AMPA-R Ab CBA, S          Negative          Negative   [1]   ------------------------------------------------------------   Amphiphysin Ab, S         Negative          Negative   [1]   ------------------------------------------------------------   AGNA-1, S                 Negative          Negative   [1]   ------------------------------------------------------------   JUSTIN-1, S                 Negative          Negative   [1]   ------------------------------------------------------------   JUSTIN-2, S                 Negative          Negative   [1]   ------------------------------------------------------------   JUSTIN-3, S                 Negative          Negative   [1]   ------------------------------------------------------------   AP3B2 IFA, S              Negative          Negative   [1]   ------------------------------------------------------------   CASPR2-IgG CBA, S         Negative          Negative   [1]   ------------------------------------------------------------   CRMP-5-IgG Western Blot, S   Negative          Negative   [1]   ------------------------------------------------------------   DPPX Ab CBA, S            Negative          Negative   [1]   ------------------------------------------------------------   MICHELLE-B-R Ab CBA, S        Negative          Negative   [1]   ------------------------------------------------------------   GFAP IFA, S               Negative          Negative   [1]    ------------------------------------------------------------   GRAF1 IFA, S              Negative          Negative   [1]   ------------------------------------------------------------   IgLON5 CBA, S             Negative          Negative   [1]   ------------------------------------------------------------   ITPR1 IFA, S              Negative          Negative   [1]   ------------------------------------------------------------   KLHL11 Ab CBA, S          Negative          Negative   [1]   ------------------------------------------------------------   LGI1-IgG CBA, S           Negative          Negative   [1]   ------------------------------------------------------------   mGluR1 Ab IFA, S          Negative          Negative   [1]   ------------------------------------------------------------   Neurochondrin IFA, S      Negative          Negative   [1]   ------------------------------------------------------------   NIF IFA, S                Negative          Negative   [1]   ------------------------------------------------------------   NMDA-R Ab CBA, S          Negative          Negative   [1]   ------------------------------------------------------------   P/Q-Type Calcium Channel Ab   0.00      nmol/L  <=0.02     [1]   ------------------------------------------------------------   PCA-1, S                  Negative          Negative   [1]   ------------------------------------------------------------   PCA-2, S                  Negative          Negative   [1]   ------------------------------------------------------------   PCA-Tr, S                 Negative          Negative   [1]   ------------------------------------------------------------   PDE10A Ab IFA, S          Negative          Negative   [1]   ------------------------------------------------------------   Septin-5 IFA, S           Negative          Negative   [1]   ------------------------------------------------------------   Septin-7 IFA, S            Negative          Negative   [1]   ------------------------------------------------------------   TRIM46 Ab IFA, S          Negative          Negative   [1]     25-Hydroxy   Vitamin D 25   Date Value Ref Range Status   05/16/2024 22 (L) 30 - 100 ng/mL Final     Comment:     Adult Ranges:   <20 ng/mL - Deficiency  20-29 ng/mL - Insufficiency   ng/mL - Sufficiency  Electrochemiluminescence binding assay performed using Roche tacho e  immunoassay analyzer.  The [employer] Vitamin D total II assay is intended for  the quantitative determination of total 25 hydroxyvitamin D in human serum  and plasma. This assay is to be used as an aid in the assessment of vitamin  D sufficiency in adults.       Vitamin B12 -True Cobalamin   Date Value Ref Range Status   01/04/2024 654 211 - 911 pg/mL Final     TSH   Date Value Ref Range Status   11/16/2023 2.240 0.380 - 5.330 uIU/mL Final     Comment:     The 2011 American Thyroid Association (BRENDA) guidelines  recommended that the interpretation of thyroid function in  pregnancy be based on trimester specific reference ranges.    1st Trimester  0.100-2.500 mIU/L  2nd Trimester  0.200-3.000 mIU/L  3rd Trimester  0.300-3.500 mIU/L    These established reference ranges have not been validated  at [employer].       HIV 1/0/2   Date Value Ref Range Status   08/06/2012 see below Non Reactive Final     Comment:     Non Reactive:  Screen for Enhanced HIV 1/O/2 is NEGATIVE for  HIV-1, including group O, and HIV-2 antibodies.  A negative  test result at any point in the investigation of individual  subjects does not preclude the possibility of exposure to or  infection with HIV 1/O/2.     Glycohemoglobin   Date Value Ref Range Status   05/16/2024 5.1 4.0 - 5.6 % Final     Comment:     Increased risk for diabetes:  5.7 -6.4%  Diabetes:  >6.4%  Glycemic control for adults with diabetes:  <7.0%    The above interpretations are per ADA guidelines.  Diagnosis  of diabetes  "mellitus on the basis of elevated Hemoglobin A1c  should be confirmed by repeating the Hb A1c test.       LDL   Date Value Ref Range Status   05/16/2024 57 <100 mg/dL Final                OBJECTIVE      Vitals:    04/29/25 0932   BP: 100/60   BP Location: Left arm   Patient Position: Sitting   BP Cuff Size: Adult   Pulse: 89   Resp: 18   Temp: 36.4 °C (97.6 °F)   TempSrc: Temporal   SpO2: 96%   Weight: 56.2 kg (123 lb 14.4 oz)   Height: 1.727 m (5' 8\")     Physical Exam  Speech slightly dysarthric    Abnormal movement (approx 60% of time)    Face: Repetitive eye closure, dyskinetic tongue and jaw movement  Neck: Repetitive left rotation and retrocollis, dystonic in appearance  Arms: Right arm greater than left arm choreiform movements          PROCEDURE   N/A  "

## 2025-05-19 RX ORDER — HYDROXYZINE PAMOATE 50 MG/1
CAPSULE ORAL
COMMUNITY
Start: 2025-04-16 | End: 2025-05-20

## 2025-05-19 RX ORDER — CLONIDINE HYDROCHLORIDE 0.1 MG/1
TABLET ORAL
COMMUNITY
Start: 2025-04-16

## 2025-05-19 RX ORDER — CLONIDINE HYDROCHLORIDE 0.1 MG/1
TABLET, EXTENDED RELEASE ORAL
COMMUNITY
Start: 2025-04-23 | End: 2025-05-20

## 2025-05-20 ENCOUNTER — APPOINTMENT (OUTPATIENT)
Dept: MEDICAL GROUP | Facility: PHYSICIAN GROUP | Age: 40
End: 2025-05-20
Payer: MEDICARE

## 2025-05-20 VITALS
DIASTOLIC BLOOD PRESSURE: 62 MMHG | BODY MASS INDEX: 19.35 KG/M2 | HEART RATE: 80 BPM | HEIGHT: 68 IN | SYSTOLIC BLOOD PRESSURE: 112 MMHG | WEIGHT: 127.7 LBS | OXYGEN SATURATION: 99 % | TEMPERATURE: 97.9 F | RESPIRATION RATE: 16 BRPM

## 2025-05-20 DIAGNOSIS — F90.2 ATTENTION DEFICIT HYPERACTIVITY DISORDER (ADHD), COMBINED TYPE: Chronic | ICD-10-CM

## 2025-05-20 DIAGNOSIS — F33.0 MILD EPISODE OF RECURRENT MAJOR DEPRESSIVE DISORDER (HCC): Chronic | ICD-10-CM

## 2025-05-20 DIAGNOSIS — E78.5 DYSLIPIDEMIA: ICD-10-CM

## 2025-05-20 DIAGNOSIS — G40.909 SEIZURE DISORDER (HCC): ICD-10-CM

## 2025-05-20 DIAGNOSIS — F51.01 PRIMARY INSOMNIA: ICD-10-CM

## 2025-05-20 DIAGNOSIS — R53.82 CHRONIC FATIGUE: ICD-10-CM

## 2025-05-20 DIAGNOSIS — G24.01 TARDIVE DYSTONIA: Primary | ICD-10-CM

## 2025-05-20 DIAGNOSIS — F70 MILD INTELLECTUAL DISABILITY: Chronic | ICD-10-CM

## 2025-05-20 DIAGNOSIS — E55.9 VITAMIN D DEFICIENCY: Chronic | ICD-10-CM

## 2025-05-20 DIAGNOSIS — F39 MOOD DISORDER (HCC): Chronic | ICD-10-CM

## 2025-05-20 PROCEDURE — 3074F SYST BP LT 130 MM HG: CPT | Performed by: INTERNAL MEDICINE

## 2025-05-20 PROCEDURE — 99214 OFFICE O/P EST MOD 30 MIN: CPT | Performed by: INTERNAL MEDICINE

## 2025-05-20 PROCEDURE — 3078F DIAST BP <80 MM HG: CPT | Performed by: INTERNAL MEDICINE

## 2025-05-20 RX ORDER — DIAZEPAM 2 MG/1
2 TABLET ORAL DAILY
COMMUNITY
End: 2025-05-20

## 2025-05-20 RX ORDER — CHOLECALCIFEROL (VITAMIN D3) 25 MCG
2000 TABLET ORAL DAILY
Qty: 180 TABLET | Refills: 3 | Status: SHIPPED | OUTPATIENT
Start: 2025-05-20

## 2025-05-20 ASSESSMENT — FIBROSIS 4 INDEX: FIB4 SCORE: 0.99

## 2025-05-20 NOTE — ASSESSMENT & PLAN NOTE
Chronic condition.  Followed by psychiatrist.  Patient takes hydroxyzine daily.  Symptoms are fairly well-controlled.  No new issues reported.

## 2025-05-20 NOTE — ASSESSMENT & PLAN NOTE
Chronic condition.  Patient followed by psychiatrist.  Patient was on Ritalin previously.  Symptoms are stable at this time

## 2025-05-20 NOTE — PROGRESS NOTES
PRIMARY CARE CLINIC VISIT        Chief Complaint   Patient presents with    Follow-Up      Tardive dystonia  Seizure disorder  Depression  ADHD  Mood disorder  Mild intellectual disability  Vitamin D deficiency      History of Present Illness     Tardive dystonia  Chronic ongoing condition.  Patient presently followed by neurology service.   No symptoms reported.  Patient presently taking Ingrezza and was started on clonazepam recently.  Patient also taking trihexyphenidyl.    Seizure disorder (HCC)  Chronic condition.  Followed by neurology.    Presently taking clonazepam.  Neurologist has  plan to to repeat MRI send neck movement improved.    Mild episode of recurrent major depressive disorder (HCC)  Chronic condition.  Patient followed by private psychiatrist.  Patient currently taking mirtazapine.  Denies SI.  Patient tolerating medication well.    Mood disorder (HCC)  Chronic condition.  Followed by psychiatrist.  Patient takes hydroxyzine daily.  Symptoms are fairly well-controlled.  No new issues reported.    Attention deficit hyperactivity disorder (ADHD), combined type  Chronic condition.  Patient followed by psychiatrist.  Patient was on Ritalin previously.  Symptoms are stable at this time    Mild intellectual disability  Chronic stable condition.  Patient is working with a personal caregiver.  Conditions are stable.  Patient has group activities on regular basis    Vitamin D deficiency  Chronic condition.  The patient currently not taking vitamin D supplementation.  Chart review showed low vitamin D previously.  Advised the patient to start vitamin D3 2000 unit daily and to repeat the lab test after a few weeks.    Primary insomnia  Chronic condition.  Patient presently taking mirtazapine and hydroxyzine.  Patient followed by psychiatrist.  Symptoms are well-controlled.    Medications Ordered Prior to Encounter[1]     Allergies: Penicillins    Current Medications and Prescriptions Ordered in  "Epic[2]    Past Medical History[3]    Past Surgical History[4]    Family History   Problem Relation Age of Onset    Bipolar disorder Brother     Other Brother        Tobacco Use History[5]    Social History     Substance and Sexual Activity   Alcohol Use Not Currently    Comment: occasionally       Review of systems  As per HPI above. All other systems reviewed and negative.      Past Medical, Social, and Family history reviewed and updated in [facility]       LAB DATA:     I have independently reviewed / interpreted labs    Lab Results   Component Value Date/Time    HBA1C 5.1 05/16/2024 08:03 AM    HBA1C 5.1 11/16/2023 07:54 AM    HBA1C 5.4 03/27/2018 08:04 AM        Lab Results   Component Value Date/Time    WBC 8.7 10/28/2024 10:56 AM    HEMOGLOBIN 14.4 10/28/2024 10:56 AM    HEMATOCRIT 44.0 10/28/2024 10:56 AM    MCV 85.1 10/28/2024 10:56 AM    PLATELETCT 222 10/28/2024 10:56 AM       Lab Results   Component Value Date/Time    SODIUM 137 05/16/2024 08:03 AM    POTASSIUM 4.2 05/16/2024 08:03 AM    GLUCOSE 93 05/16/2024 08:03 AM    BUN 18 05/16/2024 08:03 AM    CREATININE 0.53 05/16/2024 08:03 AM       Lab Results   Component Value Date/Time    CHOLSTRLTOT 116 05/16/2024 08:03 AM    TRIGLYCERIDE 57 05/16/2024 08:03 AM    HDL 48 05/16/2024 08:03 AM    LDL 57 05/16/2024 08:03 AM       Lab Results   Component Value Date/Time    ALTSGPT 26 03/20/2024 09:25 AM          Objective     /62 (BP Location: Left arm, Patient Position: Sitting, BP Cuff Size: Small adult)   Pulse 80   Temp 36.6 °C (97.9 °F) (Temporal)   Resp 16   Ht 1.727 m (5' 8\")   Wt 57.9 kg (127 lb 11.2 oz)   SpO2 99%    Body mass index is 19.42 kg/m².    General: alert in no apparent distress.  Cardiovascular: regular rate and rhythm  Pulmonary: lungs : no wheezing   Gastrointestinal: BS present.       Assessment and Plan     1. Tardive dystonia  This is a chronic condition.  Patient will continue taking clonazepam and Artane  Continue follow-up with " neurologist as directed    2. Seizure disorder (HCC)  Chronic condition.  Stable.  continue clonazepam treatment.  To follow-up with neurologist    3. Mild episode of recurrent major depressive disorder (HCC)  Chronic stable.  Continue mirtazapine 30 Mg daily.    4. Attention deficit hyperactivity disorder (ADHD), combined type  Chronic stable condition.  No longer take.  Symptoms are stable.  Continue to monitor.  Patient to follow-up with psychiatrist    5. Mood disorder (HCC)  Chronic stable condition.  Continue hydroxyzine 100 mg nightly.  Follow-up with psychiatrist    6. Mild intellectual disability  Chronic stable condition.  The patient is working with a care provider since last 20 years.  Stable.  Continue to monitor      7. Vitamin D deficiency  Chronic condition condition.  Unstable.  Advised the patient to start vitamin D3 2000 unit daily.  Repeat lab test in a few weeks.  - vitamin D (CHOLECALCIFEROL) 1000 Unit Tab; Take 2 Tablets by mouth every day.  Dispense: 180 Tablet; Refill: 3  - VITAMIN D,25 HYDROXY (DEFICIENCY); Future    8. insomnia.  Chronic stable.  Continue hydroxyzine and mirtazapine.  Follow-up with psychiatrist.  .            Please note that this dictation was created using voice recognition software. I have made every reasonable attempt to correct obvious errors, but I expect that there are errors of grammar and possibly content that I did not discover before finalizing the note.    Mk Yanez MD  Internal Medicine  [facility]       [1]   Current Outpatient Medications on File Prior to Visit   Medication Sig Dispense Refill    cloNIDine (CATAPRES) 0.1 MG Tab       trihexyphenidyl (ARTANE) 2 MG Tab Take 2 Tablets by mouth 3 times a day for 360 days. For abnormal movements 540 Tablet 3    clonazePAM (KLONOPIN) 1 MG Tab Take 1 Tablet by mouth 2 times a day for 180 days. For dystonia. Start with half-tablet twice a day for first week. 180 Tablet 1    hydrOXYzine pamoate  (VISTARIL) 100 MG Cap Take 100 mg by mouth every evening.      propranolol (INDERAL) 20 MG Tab Take 20 mg by mouth 2 times a day.      amantadine (SYMMETREL) 100 MG Cap Take 100 mg by mouth 2 times a day.      mirtazapine (REMERON) 30 MG Tab tablet Take 1 Tablet by mouth every evening.      ferrous sulfate 325 (65 Fe) MG tablet TAKE 1 TABLET BY MOUTH ON MONDAY, WEDNESDAY AND FRIDAY 60 Tablet 4    Valbenazine Tosylate (INGREZZA) 80 MG Cap Take 80 mg by mouth.       No current facility-administered medications on file prior to visit.   [2]   Current Outpatient Medications Ordered in Epic   Medication Sig Dispense Refill    vitamin D (CHOLECALCIFEROL) 1000 Unit Tab Take 2 Tablets by mouth every day. 180 Tablet 3    cloNIDine (CATAPRES) 0.1 MG Tab       trihexyphenidyl (ARTANE) 2 MG Tab Take 2 Tablets by mouth 3 times a day for 360 days. For abnormal movements 540 Tablet 3    clonazePAM (KLONOPIN) 1 MG Tab Take 1 Tablet by mouth 2 times a day for 180 days. For dystonia. Start with half-tablet twice a day for first week. 180 Tablet 1    hydrOXYzine pamoate (VISTARIL) 100 MG Cap Take 100 mg by mouth every evening.      propranolol (INDERAL) 20 MG Tab Take 20 mg by mouth 2 times a day.      amantadine (SYMMETREL) 100 MG Cap Take 100 mg by mouth 2 times a day.      mirtazapine (REMERON) 30 MG Tab tablet Take 1 Tablet by mouth every evening.      ferrous sulfate 325 (65 Fe) MG tablet TAKE 1 TABLET BY MOUTH ON MONDAY, WEDNESDAY AND FRIDAY 60 Tablet 4    Valbenazine Tosylate (INGREZZA) 80 MG Cap Take 80 mg by mouth.       No current Epic-ordered facility-administered medications on file.   [3]   Past Medical History:  Diagnosis Date    ADHD (attention deficit hyperactivity disorder)     Compression fracture of L2 (HCC) 09/25/2024    > SPINE      Psychiatric problem     Mild Mental Retardation    Tardive dyskinesia    [4]   Past Surgical History:  Procedure Laterality Date    BLAIR RECTAL ABSCESS INCISION AND DRAINAGE N/A  2/26/2016    Procedure: BLAIR RECTAL ABSCESS INCISION AND DRAINAGE;  Surgeon: Shanna Lindo M.D.;  Location: SURGERY [facility];  Service:    [5]   Social History  Tobacco Use   Smoking Status Former    Current packs/day: 0.25    Average packs/day: 0.3 packs/day for 19.2 years (4.8 ttl pk-yrs)    Types: Cigarettes    Start date: 2/27/2006   Smokeless Tobacco Never

## 2025-05-20 NOTE — ASSESSMENT & PLAN NOTE
Chronic condition.  Followed by neurology.    Presently taking clonazepam.  Neurologist has  plan to to repeat MRI send neck movement improved.

## 2025-05-20 NOTE — ASSESSMENT & PLAN NOTE
Chronic condition.  Patient presently taking mirtazapine and hydroxyzine.  Patient followed by psychiatrist.  Symptoms are well-controlled.

## 2025-05-20 NOTE — ASSESSMENT & PLAN NOTE
Chronic stable condition.  Patient is working with a personal caregiver.  Conditions are stable.  Patient has group activities on regular basis

## 2025-05-20 NOTE — ASSESSMENT & PLAN NOTE
Chronic ongoing condition.  Patient presently followed by neurology service.   No symptoms reported.  Patient presently taking Ingrezza and was started on clonazepam recently.  Patient also taking trihexyphenidyl.

## 2025-05-20 NOTE — ASSESSMENT & PLAN NOTE
Chronic condition.  The patient currently not taking vitamin D supplementation.  Chart review showed low vitamin D previously.  Advised the patient to start vitamin D3 2000 unit daily and to repeat the lab test after a few weeks.

## 2025-07-29 ENCOUNTER — TELEPHONE (OUTPATIENT)
Dept: HEALTH INFORMATION MANAGEMENT | Facility: OTHER | Age: 40
End: 2025-07-29
Payer: MEDICARE

## 2025-08-08 ENCOUNTER — DOCUMENTATION (OUTPATIENT)
Dept: HEALTH INFORMATION MANAGEMENT | Facility: OTHER | Age: 40
End: 2025-08-08
Payer: MEDICARE

## 2025-08-28 ENCOUNTER — TELEPHONE (OUTPATIENT)
Dept: NEUROLOGY | Facility: MEDICAL CENTER | Age: 40
End: 2025-08-28
Payer: MEDICARE